# Patient Record
Sex: FEMALE | Race: BLACK OR AFRICAN AMERICAN | NOT HISPANIC OR LATINO | Employment: FULL TIME | URBAN - METROPOLITAN AREA
[De-identification: names, ages, dates, MRNs, and addresses within clinical notes are randomized per-mention and may not be internally consistent; named-entity substitution may affect disease eponyms.]

---

## 2017-06-01 ENCOUNTER — OFFICE VISIT (OUTPATIENT)
Dept: OBSTETRICS AND GYNECOLOGY | Facility: CLINIC | Age: 32
End: 2017-06-01
Attending: OBSTETRICS & GYNECOLOGY
Payer: COMMERCIAL

## 2017-06-01 VITALS
BODY MASS INDEX: 35.08 KG/M2 | HEIGHT: 64 IN | WEIGHT: 205.5 LBS | DIASTOLIC BLOOD PRESSURE: 80 MMHG | SYSTOLIC BLOOD PRESSURE: 112 MMHG

## 2017-06-01 DIAGNOSIS — Z11.51 SCREENING FOR HUMAN PAPILLOMAVIRUS: Primary | ICD-10-CM

## 2017-06-01 DIAGNOSIS — Z01.419 ENCOUNTER FOR GYNECOLOGICAL EXAMINATION (GENERAL) (ROUTINE) WITHOUT ABNORMAL FINDINGS: ICD-10-CM

## 2017-06-01 DIAGNOSIS — Z12.4 ROUTINE CERVICAL SMEAR: ICD-10-CM

## 2017-06-01 PROCEDURE — 99395 PREV VISIT EST AGE 18-39: CPT | Mod: S$GLB,,, | Performed by: OBSTETRICS & GYNECOLOGY

## 2017-06-01 PROCEDURE — 87624 HPV HI-RISK TYP POOLED RSLT: CPT

## 2017-06-01 PROCEDURE — 88142 CYTOPATH C/V THIN LAYER: CPT

## 2017-06-01 PROCEDURE — 99999 PR PBB SHADOW E&M-EST. PATIENT-LVL III: CPT | Mod: PBBFAC,,, | Performed by: OBSTETRICS & GYNECOLOGY

## 2017-06-01 RX ORDER — TOPIRAMATE 100 MG/1
CAPSULE, EXTENDED RELEASE ORAL
COMMUNITY
Start: 2017-04-27 | End: 2018-10-25

## 2017-06-01 NOTE — PROGRESS NOTES
Subjective:       Patient ID: Madhu Saucedo is a 32 y.o. female.    Chief Complaint:  Well Woman (2016 pap normal, hpv qns, )      Patient Active Problem List   Diagnosis    IUD (intrauterine device) in place       History of Present Illness  32 y.o. yo  here for annual exam. No gyn complaints. Doing well. Mirena x 4 years. Trouble losing weight. Info Medi      Past Medical History:   Diagnosis Date    Esophageal reflux     Migraine        History reviewed. No pertinent surgical history.    OB History    Para Term  AB Living   3 2 2  1 2   SAB TAB Ectopic Multiple Live Births       2      # Outcome Date GA Lbr Maximus/2nd Weight Sex Delivery Anes PTL Lv   3 Term 10/25/07   3.969 kg (8 lb 12 oz) M Vag-Spont   BONIFACIO   2 Term 01   4.026 kg (8 lb 14 oz) F Vag-Spont   BONIFACIO   1 AB                   No LMP recorded. Patient is not currently having periods (Reason: Birth Control).   Date of Last Pap: 2016    Review of Systems  Review of Systems   Constitutional: Negative for fatigue and unexpected weight change.   Respiratory: Negative for shortness of breath.    Cardiovascular: Negative for chest pain.   Gastrointestinal: Negative for abdominal pain, constipation, diarrhea, nausea and vomiting.   Genitourinary: Negative for dysuria.   Musculoskeletal: Negative for back pain.   Skin: Negative for rash.   Neurological: Negative for headaches.   Hematological: Does not bruise/bleed easily.   Psychiatric/Behavioral: Negative for behavioral problems.        Objective:   Physical Exam:   Constitutional: She is oriented to person, place, and time. Vital signs are normal. She appears well-developed and well-nourished. No distress.        Pulmonary/Chest: She exhibits no mass. Right breast exhibits no mass, no nipple discharge, no skin change, no tenderness, no bleeding and no swelling. Left breast exhibits no mass, no nipple discharge, no skin change, no tenderness, no bleeding and no  swelling. Breasts are symmetrical.        Abdominal: Soft. Normal appearance and bowel sounds are normal. She exhibits no distension and no mass. There is no tenderness. There is no rebound.     Genitourinary: Vagina normal and uterus normal. There is no rash, tenderness, lesion or injury on the right labia. There is no rash, tenderness, lesion or injury on the left labia. Uterus is not deviated, not enlarged, not fixed, not tender, not hosting fibroids and not experiencing uterine prolapse. Cervix is normal. Right adnexum displays no mass, no tenderness and no fullness. Left adnexum displays no mass, no tenderness and no fullness. No erythema, tenderness, rectocele, cystocele or unspecified prolapse of vaginal walls in the vagina. No vaginal discharge found. Cervix exhibits no motion tenderness, no discharge and no friability. Additional cervical findings: IUD strings visualized          Musculoskeletal: Normal range of motion and moves all extremeties.      Lymphadenopathy:     She has no axillary adenopathy.        Right: No supraclavicular adenopathy present.        Left: No supraclavicular adenopathy present.    Neurological: She is alert and oriented to person, place, and time.    Skin: Skin is warm and dry.    Psychiatric: She has a normal mood and affect. Her behavior is normal. Judgment normal.        Assessment/ Plan:     1. Screening for human papillomavirus  HPV High Risk Genotypes, PCR   2. Routine cervical smear  Liquid-based pap smear, screening   3. Encounter for gynecological examination (general) (routine) without abnormal findings         Follow-up with me in 1 year

## 2017-06-07 LAB
HPV HR 12 DNA CVX QL NAA+PROBE: NEGATIVE
HPV16 DNA SPEC QL NAA+PROBE: NEGATIVE
HPV18 DNA SPEC QL NAA+PROBE: NEGATIVE

## 2018-01-23 ENCOUNTER — PATIENT MESSAGE (OUTPATIENT)
Dept: OBSTETRICS AND GYNECOLOGY | Facility: CLINIC | Age: 33
End: 2018-01-23

## 2018-01-24 ENCOUNTER — OFFICE VISIT (OUTPATIENT)
Dept: OBSTETRICS AND GYNECOLOGY | Facility: CLINIC | Age: 33
End: 2018-01-24
Payer: COMMERCIAL

## 2018-01-24 VITALS
BODY MASS INDEX: 34.62 KG/M2 | WEIGHT: 202.81 LBS | DIASTOLIC BLOOD PRESSURE: 76 MMHG | HEIGHT: 64 IN | SYSTOLIC BLOOD PRESSURE: 130 MMHG

## 2018-01-24 DIAGNOSIS — N89.8 VAGINAL DISCHARGE: Primary | ICD-10-CM

## 2018-01-24 PROCEDURE — 87480 CANDIDA DNA DIR PROBE: CPT

## 2018-01-24 PROCEDURE — 99999 PR PBB SHADOW E&M-EST. PATIENT-LVL III: CPT | Mod: PBBFAC,,, | Performed by: NURSE PRACTITIONER

## 2018-01-24 PROCEDURE — 99213 OFFICE O/P EST LOW 20 MIN: CPT | Mod: S$GLB,,, | Performed by: NURSE PRACTITIONER

## 2018-01-24 RX ORDER — ONDANSETRON 4 MG/1
TABLET, FILM COATED ORAL
COMMUNITY
Start: 2017-12-13

## 2018-01-24 NOTE — PROGRESS NOTES
Chief Complaint   Patient presents with    Vaginal Discharge      Dr LUNDBERG     PELVIC PRESSURE       (Dr. Cullen patient)  Last PAP:  2017 Normal,  HPV negative      Madhu Saucedo is a 32 y.o. female  presents with complaint of vaginal discharge for 1 week.  She denies itching.  reports odor.  She states the discharge is white.  No LMP recorded. Patient is not currently having periods (Reason: Birth Control).  States she has had BV in the past, and feels like she has it now.  Ms. Saucedo is currently sexually active with a single male partner/.  She declines STD screening today.    Past Medical History:   Diagnosis Date    Esophageal reflux     Migraine      History reviewed. No pertinent surgical history.  Social History   Substance Use Topics    Smoking status: Never Smoker    Smokeless tobacco: Never Used    Alcohol use Yes      Comment: 3xwk      Family History   Problem Relation Age of Onset    Breast cancer Maternal Aunt     Colon cancer Neg Hx     Ovarian cancer Neg Hx      OB History    Para Term  AB Living   3 2 2   1 2   SAB TAB Ectopic Multiple Live Births           2      # Outcome Date GA Lbr Maximus/2nd Weight Sex Delivery Anes PTL Lv   3 Term 10/25/07   3.969 kg (8 lb 12 oz) M Vag-Spont   BONIFACIO   2 Term 01   4.026 kg (8 lb 14 oz) F Vag-Spont   BONIFACIO   1 AB                     ROS:  GENERAL: No fever, chills, fatigue or weight loss.  VULVAR: denies pain, denies lesions and denies itching.  VAGINAL: denies itching, denies abnormal bleeding and denies lesions.  Reports discharge and odor.  ABDOMEN: reports low abdominal pressure. Denies nausea. Denies vomiting. No diarrhea. No constipation  BREAST: Denies pain. No lumps. No discharge.  URINARY: No incontinence, no nocturia, no frequency and no dysuria.  CARDIOVASCULAR: No chest pain. No shortness of breath. No leg cramps.  NEUROLOGICAL: No headaches. No vision changes.    Vitals:    18 1412   BP: 130/76  "  Weight: 92 kg (202 lb 13.2 oz)   Height: 5' 4" (1.626 m)   PainSc: 0-No pain     Body mass index is 34.81 kg/m².    PHYSICAL EXAM:   External genitalia: normal external genitalia, no erythema and no discharge;  urethra: normal appearing urethra with no masses, tenderness or lesions.   Vagina: normal appearing vagina with normal color; small amount white milky discharge; no lesions.    Cervix: normal appearing cervix without discharge or lesions, cervical motion tenderness absent.  IUD strings visible/palpable.  Uterus: uterus is normal size, shape, consistency and nontender. Adnexa:  normal adnexa and no mass, fullness, tenderness.    ASSESSMENT and PLAN:  Vaginal discharge  -     Vaginosis Screen by DNA Probe    * Prefers something oral, other than Flagyl if (+ BV), as it did not work in the past.    * Will call with results when finalized.    Patient was counseled today on vaginitis prevention, including to:  1.  Avoid feminine products such as deodorant soaps, body wash, bubble bath, douches, scented toilet paper, deodorant tampons or pads, feminine wipes, chronic pad use, etc.  2.  Avoid other vulvovaginal irritants such as long hot baths, humidity, tight, synthetic clothing, chlorine and sitting around in wet bathing suits  3.  Wear cotton underwear.  4.  Shower immediately after exercise and change clothes  5.  Use polyurethane condoms without spermicide if sexually active and symptoms are triggered by intercourse    To help maintain a healthy vaginal pH:  For vaginal discomfort or feminine odor, she may use OTC Rephresh gel.  It is a vaginal gel used to neutralize and maintain a healthy vaginal pH.  Patients who get Yeast or BV often use this to help reduce risk of vaginal issues.  Maintaining a healthy pH helps beneficial bacteria to thrive and inhibits overgrowths of yeast and pathogenic bacteria.  It can be found at any drugstore on the feminine product aisle, and can be used as frequently as every 3 " days.          FOLLOW UP: PRN lack of improvement.

## 2018-01-25 ENCOUNTER — PATIENT MESSAGE (OUTPATIENT)
Dept: OBSTETRICS AND GYNECOLOGY | Facility: CLINIC | Age: 33
End: 2018-01-25

## 2018-01-25 LAB
CANDIDA RRNA VAG QL PROBE: NEGATIVE
G VAGINALIS RRNA GENITAL QL PROBE: POSITIVE
T VAGINALIS RRNA GENITAL QL PROBE: NEGATIVE

## 2018-01-26 ENCOUNTER — TELEPHONE (OUTPATIENT)
Dept: OBSTETRICS AND GYNECOLOGY | Facility: CLINIC | Age: 33
End: 2018-01-26

## 2018-01-26 RX ORDER — TINIDAZOLE 500 MG/1
TABLET ORAL
Qty: 8 TABLET | Refills: 0 | Status: SHIPPED | OUTPATIENT
Start: 2018-01-26 | End: 2018-05-02

## 2018-01-26 NOTE — TELEPHONE ENCOUNTER
----- Message from Tahmina Estrella NP sent at 1/26/2018 11:18 AM CST -----  Patient's AFFIRM swab was (+) for BV.    Please call her and let her know Rx sent for (Tinidazole x 2 days).  Remind patient she cannot drink while taking medication or for 3 days after completion of medication.      # Instruct patient to contact pharmacy to check on status of RX

## 2018-01-26 NOTE — PROGRESS NOTES
Patient's AFFIRM swab was (+) for BV.    Please call her and let her know Rx sent for (Tinidazole x 2 days).  Remind patient she cannot drink while taking medication or for 3 days after completion of medication.    * Instruct patient to contact pharmacy to check on status of RX

## 2018-04-14 ENCOUNTER — PATIENT MESSAGE (OUTPATIENT)
Dept: OBSTETRICS AND GYNECOLOGY | Facility: CLINIC | Age: 33
End: 2018-04-14

## 2018-04-14 DIAGNOSIS — Z20.2 POSSIBLE EXPOSURE TO STD: Primary | ICD-10-CM

## 2018-04-16 NOTE — TELEPHONE ENCOUNTER
I called patient.  unfaithful. Wants STD testing. Give appt with me some time in the next couple of weeks

## 2018-04-18 ENCOUNTER — LAB VISIT (OUTPATIENT)
Dept: LAB | Facility: OTHER | Age: 33
End: 2018-04-18
Attending: OBSTETRICS & GYNECOLOGY
Payer: COMMERCIAL

## 2018-04-18 DIAGNOSIS — Z20.2 POSSIBLE EXPOSURE TO STD: ICD-10-CM

## 2018-04-18 LAB
HBV SURFACE AG SERPL QL IA: NEGATIVE
HIV 1+2 AB+HIV1 P24 AG SERPL QL IA: NEGATIVE
RPR SER QL: NORMAL

## 2018-04-18 PROCEDURE — 86696 HERPES SIMPLEX TYPE 2 TEST: CPT

## 2018-04-18 PROCEDURE — 36415 COLL VENOUS BLD VENIPUNCTURE: CPT

## 2018-04-18 PROCEDURE — 86703 HIV-1/HIV-2 1 RESULT ANTBDY: CPT

## 2018-04-18 PROCEDURE — 87340 HEPATITIS B SURFACE AG IA: CPT

## 2018-04-18 PROCEDURE — 86592 SYPHILIS TEST NON-TREP QUAL: CPT

## 2018-04-20 LAB
HSV1 IGG SERPL QL IA: NEGATIVE
HSV2 IGG SERPL QL IA: POSITIVE

## 2018-05-02 ENCOUNTER — OFFICE VISIT (OUTPATIENT)
Dept: OBSTETRICS AND GYNECOLOGY | Facility: CLINIC | Age: 33
End: 2018-05-02
Attending: OBSTETRICS & GYNECOLOGY
Payer: COMMERCIAL

## 2018-05-02 VITALS
WEIGHT: 202.69 LBS | HEIGHT: 64 IN | SYSTOLIC BLOOD PRESSURE: 140 MMHG | BODY MASS INDEX: 34.6 KG/M2 | DIASTOLIC BLOOD PRESSURE: 90 MMHG

## 2018-05-02 DIAGNOSIS — F41.1 GAD (GENERALIZED ANXIETY DISORDER): ICD-10-CM

## 2018-05-02 DIAGNOSIS — Z20.2 EXPOSURE TO SEXUALLY TRANSMITTED DISEASE (STD): ICD-10-CM

## 2018-05-02 DIAGNOSIS — Z11.3 SCREENING EXAMINATION FOR VENEREAL DISEASE: Primary | ICD-10-CM

## 2018-05-02 PROCEDURE — 87480 CANDIDA DNA DIR PROBE: CPT

## 2018-05-02 PROCEDURE — 99999 PR PBB SHADOW E&M-EST. PATIENT-LVL III: CPT | Mod: PBBFAC,,, | Performed by: OBSTETRICS & GYNECOLOGY

## 2018-05-02 PROCEDURE — 87491 CHLMYD TRACH DNA AMP PROBE: CPT

## 2018-05-02 PROCEDURE — 99213 OFFICE O/P EST LOW 20 MIN: CPT | Mod: S$GLB,,, | Performed by: OBSTETRICS & GYNECOLOGY

## 2018-05-02 PROCEDURE — 87510 GARDNER VAG DNA DIR PROBE: CPT

## 2018-05-02 RX ORDER — ALPRAZOLAM 0.5 MG/1
0.5 TABLET ORAL 3 TIMES DAILY PRN
Qty: 20 TABLET | Refills: 0 | Status: SHIPPED | OUTPATIENT
Start: 2018-05-02 | End: 2020-10-27

## 2018-05-02 NOTE — PROGRESS NOTES
Subjective:       Patient ID: Madhu Saucedo is a 32 y.o. female.    Chief Complaint:  No chief complaint on file.      Patient Active Problem List   Diagnosis    IUD (intrauterine device) in place       History of Present Illness  Here for STD testing.  with another woman. Labs showed +HSV. No outbreaks, explained could be from exposure from years ago. All questions answered. Will do GC, Chl, Trich swab today. Talk x 20 min. Rx Xanax. Pt upset and not sleeping. Denies Si, HI  Past Medical History:   Diagnosis Date    Esophageal reflux     Migraine        History reviewed. No pertinent surgical history.    OB History    Para Term  AB Living   3 2 2   1 2   SAB TAB Ectopic Multiple Live Births           2      # Outcome Date GA Lbr Maximus/2nd Weight Sex Delivery Anes PTL Lv   3 Term 10/25/07   3.969 kg (8 lb 12 oz) M Vag-Spont   BONIFACIO   2 Term 01   4.026 kg (8 lb 14 oz) F Vag-Spont   BONIFACIO   1 AB                   No LMP recorded. Patient is not currently having periods (Reason: Birth Control).   Date of Last Pap: 2017    Review of Systems  Review of Systems   Constitutional: Negative for fatigue and unexpected weight change.   Respiratory: Negative for shortness of breath.    Cardiovascular: Negative for chest pain.   Gastrointestinal: Negative for abdominal pain, constipation, diarrhea, nausea and vomiting.   Genitourinary: Negative for dysuria.   Musculoskeletal: Negative for back pain.   Skin: Negative for rash.   Neurological: Negative for headaches.   Hematological: Does not bruise/bleed easily.   Psychiatric/Behavioral: Negative for behavioral problems.        Objective:   Physical Exam:   Constitutional: She appears well-developed and well-nourished.               Genitourinary: Vagina normal. Cervix is normal. No vaginal discharge found. Cervix exhibits no motion tenderness, no discharge and no friability. Additional cervical findings: IUD strings visualized                      Assessment/ Plan:     1. Screening examination for venereal disease  C. trachomatis/N. gonorrhoeae by AMP DNA Cervicovaginal    Vaginosis Screen by DNA Probe   2. Exposure to sexually transmitted disease (STD)     3. ROMELIA (generalized anxiety disorder)  ALPRAZolam (XANAX) 0.5 MG tablet       Follow-up with me in 1 year

## 2018-05-03 LAB
C TRACH DNA SPEC QL NAA+PROBE: NOT DETECTED
CANDIDA RRNA VAG QL PROBE: NEGATIVE
G VAGINALIS RRNA GENITAL QL PROBE: POSITIVE
N GONORRHOEA DNA SPEC QL NAA+PROBE: NOT DETECTED
T VAGINALIS RRNA GENITAL QL PROBE: NEGATIVE

## 2018-05-04 ENCOUNTER — TELEPHONE (OUTPATIENT)
Dept: OBSTETRICS AND GYNECOLOGY | Facility: CLINIC | Age: 33
End: 2018-05-04

## 2018-05-04 RX ORDER — METRONIDAZOLE 7.5 MG/G
1 GEL VAGINAL 2 TIMES DAILY
Qty: 70 G | Refills: 3 | Status: SHIPPED | OUTPATIENT
Start: 2018-05-04 | End: 2018-05-09

## 2018-07-31 ENCOUNTER — OFFICE VISIT (OUTPATIENT)
Dept: OBSTETRICS AND GYNECOLOGY | Facility: CLINIC | Age: 33
End: 2018-07-31
Attending: OBSTETRICS & GYNECOLOGY
Payer: COMMERCIAL

## 2018-07-31 ENCOUNTER — PATIENT MESSAGE (OUTPATIENT)
Dept: OBSTETRICS AND GYNECOLOGY | Facility: CLINIC | Age: 33
End: 2018-07-31

## 2018-07-31 VITALS
BODY MASS INDEX: 35.51 KG/M2 | DIASTOLIC BLOOD PRESSURE: 84 MMHG | HEIGHT: 64 IN | WEIGHT: 208 LBS | SYSTOLIC BLOOD PRESSURE: 134 MMHG

## 2018-07-31 DIAGNOSIS — Z30.432 ENCOUNTER FOR IUD REMOVAL: ICD-10-CM

## 2018-07-31 DIAGNOSIS — N76.0 ACUTE VAGINITIS: Primary | ICD-10-CM

## 2018-07-31 DIAGNOSIS — Z01.419 ENCOUNTER FOR GYNECOLOGICAL EXAMINATION (GENERAL) (ROUTINE) WITHOUT ABNORMAL FINDINGS: ICD-10-CM

## 2018-07-31 PROCEDURE — 99395 PREV VISIT EST AGE 18-39: CPT | Mod: S$GLB,,, | Performed by: OBSTETRICS & GYNECOLOGY

## 2018-07-31 PROCEDURE — 87660 TRICHOMONAS VAGIN DIR PROBE: CPT

## 2018-07-31 PROCEDURE — 99999 PR PBB SHADOW E&M-EST. PATIENT-LVL III: CPT | Mod: PBBFAC,,, | Performed by: OBSTETRICS & GYNECOLOGY

## 2018-07-31 NOTE — PROGRESS NOTES
Subjective:       Patient ID: Madhu Saucedo is a 33 y.o. female.    Chief Complaint:  Well Woman (pt last pap 2017 normal, HPV neg, pt c/o IUD removal and start OCP's )      Patient Active Problem List   Diagnosis    IUD (intrauterine device) in place       History of Present Illness  33 y.o. yo  here for annual exam. No gyn complaints. Doing well. Mirena has been in since May 2018, wants removed. Wants to try OCP for awhile. Considering vasectomy vs another Mirena vs OCP vs BTL. Discouraged BTL due to surgery and periods become heavier. Patient wants IUD removed today and was to start with continuous OCP.     Past Medical History:   Diagnosis Date    Esophageal reflux     Migraine        History reviewed. No pertinent surgical history.    OB History    Para Term  AB Living   3 2 2   1 2   SAB TAB Ectopic Multiple Live Births           2      # Outcome Date GA Lbr Maximus/2nd Weight Sex Delivery Anes PTL Lv   3 Term 10/25/07   3.969 kg (8 lb 12 oz) M Vag-Spont   BONIFACIO   2 Term 01   4.026 kg (8 lb 14 oz) F Vag-Spont   BONIFACIO   1 AB                   No LMP recorded. Patient is not currently having periods (Reason: Birth Control).   Date of Last Pap: 2017    Review of Systems  Review of Systems   Constitutional: Negative for fatigue and unexpected weight change.   Respiratory: Negative for shortness of breath.    Cardiovascular: Negative for chest pain.   Gastrointestinal: Negative for abdominal pain, constipation, diarrhea, nausea and vomiting.   Genitourinary: Negative for dysuria.   Musculoskeletal: Negative for back pain.   Skin: Negative for rash.   Neurological: Negative for headaches.   Hematological: Does not bruise/bleed easily.   Psychiatric/Behavioral: Negative for behavioral problems.        Objective:   Physical Exam:   Constitutional: She is oriented to person, place, and time. Vital signs are normal. She appears well-developed and well-nourished. No distress.         Pulmonary/Chest: She exhibits no mass. Right breast exhibits no mass, no nipple discharge, no skin change, no tenderness, no bleeding and no swelling. Left breast exhibits no mass, no nipple discharge, no skin change, no tenderness, no bleeding and no swelling. Breasts are symmetrical.        Abdominal: Soft. Normal appearance and bowel sounds are normal. She exhibits no distension and no mass. There is no tenderness. There is no rebound.     Genitourinary: Vagina normal and uterus normal. There is no rash, tenderness, lesion or injury on the right labia. There is no rash, tenderness, lesion or injury on the left labia. Uterus is not deviated, not enlarged, not fixed, not tender, not hosting fibroids and not experiencing uterine prolapse. Cervix is normal. Right adnexum displays no mass, no tenderness and no fullness. Left adnexum displays no mass, no tenderness and no fullness. No erythema, tenderness, rectocele, cystocele or unspecified prolapse of vaginal walls in the vagina. No vaginal discharge found. Cervix exhibits no motion tenderness, no discharge and no friability. Additional cervical findings: IUD strings visualized          Musculoskeletal: Normal range of motion and moves all extremeties.      Lymphadenopathy:     She has no axillary adenopathy.        Right: No supraclavicular adenopathy present.        Left: No supraclavicular adenopathy present.    Neurological: She is alert and oriented to person, place, and time.    Skin: Skin is warm and dry.    Psychiatric: She has a normal mood and affect. Her behavior is normal. Judgment normal.          Patient presents to the office requesting IUD to be removed. After verbal consent was obtained, speculum was placed. Ring forceps were used to grasp the strings and the IUD was removed without difficulty. The patient tolerated the procedure well. Speculum removed.     Assessment/ Plan:     1. Acute vaginitis  Vaginosis Screen by DNA Probe   2. Encounter for  gynecological examination (general) (routine) without abnormal findings     3. Encounter for IUD removal         Follow-up with me in 1 year

## 2018-08-01 LAB
CANDIDA RRNA VAG QL PROBE: NEGATIVE
G VAGINALIS RRNA GENITAL QL PROBE: NEGATIVE
T VAGINALIS RRNA GENITAL QL PROBE: NEGATIVE

## 2018-08-01 RX ORDER — NORGESTIMATE AND ETHINYL ESTRADIOL 0.25-0.035
1 KIT ORAL DAILY
Qty: 84 TABLET | Refills: 3 | Status: SHIPPED | OUTPATIENT
Start: 2018-08-01 | End: 2019-04-06 | Stop reason: SDUPTHER

## 2018-08-01 NOTE — TELEPHONE ENCOUNTER
Pt seen for annual yesterday. Continuous OCPs discussed. Please send to pharm.    Allergies and Pharm UTD

## 2018-10-24 ENCOUNTER — PATIENT MESSAGE (OUTPATIENT)
Dept: OBSTETRICS AND GYNECOLOGY | Facility: CLINIC | Age: 33
End: 2018-10-24

## 2018-10-25 ENCOUNTER — OFFICE VISIT (OUTPATIENT)
Dept: OBSTETRICS AND GYNECOLOGY | Facility: CLINIC | Age: 33
End: 2018-10-25
Attending: OBSTETRICS & GYNECOLOGY
Payer: COMMERCIAL

## 2018-10-25 VITALS
BODY MASS INDEX: 35.83 KG/M2 | DIASTOLIC BLOOD PRESSURE: 80 MMHG | WEIGHT: 209.88 LBS | HEIGHT: 64 IN | SYSTOLIC BLOOD PRESSURE: 128 MMHG

## 2018-10-25 DIAGNOSIS — N76.0 ACUTE VAGINITIS: Primary | ICD-10-CM

## 2018-10-25 PROCEDURE — 99999 PR PBB SHADOW E&M-EST. PATIENT-LVL III: CPT | Mod: PBBFAC,,, | Performed by: OBSTETRICS & GYNECOLOGY

## 2018-10-25 PROCEDURE — 87660 TRICHOMONAS VAGIN DIR PROBE: CPT

## 2018-10-25 PROCEDURE — 99213 OFFICE O/P EST LOW 20 MIN: CPT | Mod: S$GLB,,, | Performed by: OBSTETRICS & GYNECOLOGY

## 2018-10-25 RX ORDER — ERGOCALCIFEROL 1.25 MG/1
CAPSULE ORAL
Refills: 12 | COMMUNITY
Start: 2018-10-01 | End: 2019-08-07

## 2018-10-25 NOTE — PROGRESS NOTES
"Madhu Saucedo is a 33 y.o. female  presents with complaint of vaginal discharge for 2 days.  She denies itching.  reports odor.  She states the discharge is yellow.  No LMP recorded (lmp unknown). Patient is not currently having periods (Reason: Birth Control)..  '  Started eating yogurt when the symptoms started.     Past Medical History:   Diagnosis Date    Esophageal reflux     Migraine      History reviewed. No pertinent surgical history.  OB History    Para Term  AB Living   3 2 2   1 2   SAB TAB Ectopic Multiple Live Births           2      # Outcome Date GA Lbr Maximus/2nd Weight Sex Delivery Anes PTL Lv   3 Term 10/25/07   3.969 kg (8 lb 12 oz) M Vag-Spont   BONIFACIO   2 Term 01   4.026 kg (8 lb 14 oz) F Vag-Spont   BONIFACIO   1 AB                     ROS:  GENERAL: No fever, chills, fatigability or weight loss.  ABDOMEN: No abdominal pain. Denies nausea. Denies vomiting. No diarrhea. No constipation  BREAST: Denies pain. No lumps. No discharge.  URINARY: No incontinence, no nocturia, no frequency and no dysuria.  CARDIOVASCULAR: No chest pain. No shortness of breath. No leg cramps.  NEUROLOGICAL: No headaches. No vision changes.  : See HPI    Vitals:    10/25/18 1513   BP: 128/80   Weight: 95.2 kg (209 lb 14.1 oz)   Height: 5' 4" (1.626 m)   PainSc: 0-No pain         PHYSICAL EXAM:   External genitalia and urethra within normal limits.   Vagina- without lesions, without discharge, without erythema, without ulcers  Cervix- without cervical motion tenderness.   Uterus normal in size and nontender. No adnexal masses or tenderness palpated.      ASSESSMENT and PLAN:  Acute vaginitis        Affirm done, declines cultures  Exam appeared normal      FOLLOW UP: PRN lack of improvement.  "

## 2018-10-29 ENCOUNTER — PATIENT MESSAGE (OUTPATIENT)
Dept: OBSTETRICS AND GYNECOLOGY | Facility: CLINIC | Age: 33
End: 2018-10-29

## 2018-10-29 RX ORDER — METRONIDAZOLE 500 MG/1
500 TABLET ORAL EVERY 12 HOURS
Qty: 14 TABLET | Refills: 0 | Status: SHIPPED | OUTPATIENT
Start: 2018-10-29 | End: 2018-11-05

## 2019-04-08 ENCOUNTER — PATIENT MESSAGE (OUTPATIENT)
Dept: OBSTETRICS AND GYNECOLOGY | Facility: CLINIC | Age: 34
End: 2019-04-08

## 2019-04-08 RX ORDER — NORGESTIMATE AND ETHINYL ESTRADIOL 0.25-0.035
KIT ORAL
Qty: 84 TABLET | Refills: 2 | Status: SHIPPED | OUTPATIENT
Start: 2019-04-08 | End: 2020-01-15 | Stop reason: SDUPTHER

## 2019-04-08 RX ORDER — NORGESTIMATE AND ETHINYL ESTRADIOL 0.25-0.035
1 KIT ORAL DAILY
Qty: 112 TABLET | Refills: 0 | Status: SHIPPED | OUTPATIENT
Start: 2019-04-08 | End: 2019-05-15 | Stop reason: SDUPTHER

## 2019-05-14 ENCOUNTER — PATIENT MESSAGE (OUTPATIENT)
Dept: OBSTETRICS AND GYNECOLOGY | Facility: CLINIC | Age: 34
End: 2019-05-14

## 2019-05-15 ENCOUNTER — PATIENT MESSAGE (OUTPATIENT)
Dept: OBSTETRICS AND GYNECOLOGY | Facility: CLINIC | Age: 34
End: 2019-05-15

## 2019-05-15 RX ORDER — NORGESTIMATE AND ETHINYL ESTRADIOL 0.25-0.035
1 KIT ORAL DAILY
Qty: 112 TABLET | Refills: 0 | Status: SHIPPED | OUTPATIENT
Start: 2019-05-15 | End: 2019-08-07

## 2019-08-07 ENCOUNTER — OFFICE VISIT (OUTPATIENT)
Dept: OBSTETRICS AND GYNECOLOGY | Facility: CLINIC | Age: 34
End: 2019-08-07
Attending: OBSTETRICS & GYNECOLOGY
Payer: COMMERCIAL

## 2019-08-07 VITALS
BODY MASS INDEX: 36.08 KG/M2 | WEIGHT: 211.31 LBS | SYSTOLIC BLOOD PRESSURE: 126 MMHG | DIASTOLIC BLOOD PRESSURE: 82 MMHG | HEIGHT: 64 IN

## 2019-08-07 DIAGNOSIS — Z01.419 ENCOUNTER FOR GYNECOLOGICAL EXAMINATION (GENERAL) (ROUTINE) WITHOUT ABNORMAL FINDINGS: ICD-10-CM

## 2019-08-07 DIAGNOSIS — Z12.31 ENCOUNTER FOR SCREENING MAMMOGRAM FOR MALIGNANT NEOPLASM OF BREAST: Primary | ICD-10-CM

## 2019-08-07 PROCEDURE — 99395 PREV VISIT EST AGE 18-39: CPT | Mod: S$GLB,,, | Performed by: OBSTETRICS & GYNECOLOGY

## 2019-08-07 PROCEDURE — 99395 PR PREVENTIVE VISIT,EST,18-39: ICD-10-PCS | Mod: S$GLB,,, | Performed by: OBSTETRICS & GYNECOLOGY

## 2019-08-07 PROCEDURE — 99999 PR PBB SHADOW E&M-EST. PATIENT-LVL III: CPT | Mod: PBBFAC,,, | Performed by: OBSTETRICS & GYNECOLOGY

## 2019-08-07 PROCEDURE — 99999 PR PBB SHADOW E&M-EST. PATIENT-LVL III: ICD-10-PCS | Mod: PBBFAC,,, | Performed by: OBSTETRICS & GYNECOLOGY

## 2019-08-07 RX ORDER — PANTOPRAZOLE SODIUM 40 MG/1
TABLET, DELAYED RELEASE ORAL
COMMUNITY
Start: 2019-08-04 | End: 2020-10-27

## 2019-08-07 NOTE — PROGRESS NOTES
Subjective:       Patient ID: Madhu Saucedo is a 34 y.o. female.    Chief Complaint:  Well Woman (pap nl/hpv- 2017 )      Patient Active Problem List   Diagnosis    IUD (intrauterine device) in place       History of Present Illness  34 y.o. yo  here for annual exam. On continuous OCP. No periods. Some night sweats. rec stop and do placebo for a couple of packs instead of continuous. If not better call. Daughter graduated [employer] and is going to [employer], son at [employer]    Patient had a normal pap smear and HPV in 2017 at last annual visit. I explained new guidelines. Will repeat pap and HPV every 3 years. Answered all questions. Patient agrees.     Past Medical History:   Diagnosis Date    Esophageal reflux     Migraine        History reviewed. No pertinent surgical history.    OB History    Para Term  AB Living   3 2 2   1 2   SAB TAB Ectopic Multiple Live Births           2      # Outcome Date GA Lbr Maximus/2nd Weight Sex Delivery Anes PTL Lv   3 Term 10/25/07   3.969 kg (8 lb 12 oz) M Vag-Spont   BONIFACIO   2 Term 01   4.026 kg (8 lb 14 oz) F Vag-Spont   BONIFACIO   1 AB                No LMP recorded. (Menstrual status: Birth Control).   Date of Last Pap: 2017    Review of Systems  Review of Systems   Constitutional: Negative for fatigue and unexpected weight change.   Respiratory: Negative for shortness of breath.    Cardiovascular: Negative for chest pain.   Gastrointestinal: Negative for abdominal pain, constipation, diarrhea, nausea and vomiting.   Genitourinary: Negative for dysuria.   Musculoskeletal: Negative for back pain.   Skin: Negative for rash.   Neurological: Negative for headaches.   Hematological: Does not bruise/bleed easily.   Psychiatric/Behavioral: Negative for behavioral problems.        Objective:   Physical Exam:   Constitutional: She is oriented to person, place, and time. Vital signs are normal. She appears well-developed and well-nourished. No distress.         Pulmonary/Chest: She exhibits no mass. Right breast exhibits no mass, no nipple discharge, no skin change, no tenderness, no bleeding and no swelling. Left breast exhibits no mass, no nipple discharge, no skin change, no tenderness, no bleeding and no swelling. Breasts are symmetrical.        Abdominal: Soft. Normal appearance and bowel sounds are normal. She exhibits no distension and no mass. There is no tenderness. There is no rebound.     Genitourinary: Vagina normal and uterus normal. There is no rash, tenderness, lesion or injury on the right labia. There is no rash, tenderness, lesion or injury on the left labia. Uterus is not deviated, not enlarged, not fixed, not tender, not hosting fibroids and not experiencing uterine prolapse. Cervix is normal. Right adnexum displays no mass, no tenderness and no fullness. Left adnexum displays no mass, no tenderness and no fullness. No erythema, tenderness, rectocele, cystocele or unspecified prolapse of vaginal walls in the vagina. No vaginal discharge found. Cervix exhibits no motion tenderness, no discharge and no friability.           Musculoskeletal: Normal range of motion and moves all extremeties.      Lymphadenopathy:     She has no axillary adenopathy.        Right: No supraclavicular adenopathy present.        Left: No supraclavicular adenopathy present.    Neurological: She is alert and oriented to person, place, and time.    Skin: Skin is warm and dry.    Psychiatric: She has a normal mood and affect. Her behavior is normal. Judgment normal.        Assessment/ Plan:     1. Encounter for screening mammogram for malignant neoplasm of breast  Mammo Digital Screening [facility] w/ Daniel   2. Encounter for gynecological examination (general) (routine) without abnormal findings         Follow-up with me in 1 year

## 2019-08-13 ENCOUNTER — HOSPITAL ENCOUNTER (OUTPATIENT)
Dept: RADIOLOGY | Facility: HOSPITAL | Age: 34
Discharge: HOME | End: 2019-08-13
Attending: OBSTETRICS & GYNECOLOGY
Payer: COMMERCIAL

## 2019-08-13 DIAGNOSIS — Z12.31 ENCOUNTER FOR SCREENING MAMMOGRAM FOR MALIGNANT NEOPLASM OF BREAST: ICD-10-CM

## 2019-08-13 PROCEDURE — 77067 SCR MAMMO BI INCL CAD: CPT | Mod: 26,,, | Performed by: RADIOLOGY

## 2019-08-13 PROCEDURE — 77067 MAMMO DIGITAL SCREENING BILAT WITH TOMOSYNTHESIS_CAD: ICD-10-PCS | Mod: 26,,, | Performed by: RADIOLOGY

## 2019-08-13 PROCEDURE — 77067 SCR MAMMO BI INCL CAD: CPT | Mod: TC

## 2019-08-13 PROCEDURE — 77063 BREAST TOMOSYNTHESIS BI: CPT | Mod: 26,,, | Performed by: RADIOLOGY

## 2019-08-13 PROCEDURE — 77063 MAMMO DIGITAL SCREENING BILAT WITH TOMOSYNTHESIS_CAD: ICD-10-PCS | Mod: 26,,, | Performed by: RADIOLOGY

## 2020-01-16 RX ORDER — NORGESTIMATE AND ETHINYL ESTRADIOL 0.25-0.035
KIT ORAL
Qty: 84 TABLET | Refills: 2 | Status: SHIPPED | OUTPATIENT
Start: 2020-01-16 | End: 2020-04-06 | Stop reason: SDUPTHER

## 2020-04-06 RX ORDER — NORGESTIMATE AND ETHINYL ESTRADIOL 0.25-0.035
KIT ORAL
Qty: 84 TABLET | Refills: 2 | Status: SHIPPED | OUTPATIENT
Start: 2020-04-06 | End: 2020-06-05 | Stop reason: SDUPTHER

## 2020-06-05 RX ORDER — NORGESTIMATE AND ETHINYL ESTRADIOL 0.25-0.035
KIT ORAL
Qty: 84 TABLET | Refills: 0 | Status: SHIPPED | OUTPATIENT
Start: 2020-06-05 | End: 2020-07-31 | Stop reason: SDUPTHER

## 2020-10-27 ENCOUNTER — PATIENT MESSAGE (OUTPATIENT)
Dept: OBSTETRICS AND GYNECOLOGY | Facility: CLINIC | Age: 35
End: 2020-10-27

## 2020-10-27 RX ORDER — NORGESTIMATE AND ETHINYL ESTRADIOL 0.25-0.035
KIT ORAL
Qty: 112 TABLET | Refills: 0 | Status: SHIPPED | OUTPATIENT
Start: 2020-10-27 | End: 2020-12-17 | Stop reason: SDUPTHER

## 2020-12-17 ENCOUNTER — OFFICE VISIT (OUTPATIENT)
Dept: OBSTETRICS AND GYNECOLOGY | Facility: CLINIC | Age: 35
End: 2020-12-17
Attending: OBSTETRICS & GYNECOLOGY
Payer: COMMERCIAL

## 2020-12-17 VITALS
SYSTOLIC BLOOD PRESSURE: 120 MMHG | HEIGHT: 64 IN | BODY MASS INDEX: 39.33 KG/M2 | WEIGHT: 230.38 LBS | DIASTOLIC BLOOD PRESSURE: 84 MMHG

## 2020-12-17 DIAGNOSIS — Z12.4 ENCOUNTER FOR PAPANICOLAOU SMEAR FOR CERVICAL CANCER SCREENING: ICD-10-CM

## 2020-12-17 DIAGNOSIS — Z11.51 ENCOUNTER FOR SCREENING FOR HUMAN PAPILLOMAVIRUS (HPV): Primary | ICD-10-CM

## 2020-12-17 PROCEDURE — 3008F BODY MASS INDEX DOCD: CPT | Mod: CPTII,S$GLB,, | Performed by: OBSTETRICS & GYNECOLOGY

## 2020-12-17 PROCEDURE — 99999 PR PBB SHADOW E&M-EST. PATIENT-LVL III: CPT | Mod: PBBFAC,,, | Performed by: OBSTETRICS & GYNECOLOGY

## 2020-12-17 PROCEDURE — 87624 HPV HI-RISK TYP POOLED RSLT: CPT

## 2020-12-17 PROCEDURE — 88175 CYTOPATH C/V AUTO FLUID REDO: CPT

## 2020-12-17 PROCEDURE — 99999 PR PBB SHADOW E&M-EST. PATIENT-LVL III: ICD-10-PCS | Mod: PBBFAC,,, | Performed by: OBSTETRICS & GYNECOLOGY

## 2020-12-17 PROCEDURE — 1126F AMNT PAIN NOTED NONE PRSNT: CPT | Mod: S$GLB,,, | Performed by: OBSTETRICS & GYNECOLOGY

## 2020-12-17 PROCEDURE — 99395 PREV VISIT EST AGE 18-39: CPT | Mod: S$GLB,,, | Performed by: OBSTETRICS & GYNECOLOGY

## 2020-12-17 PROCEDURE — 1126F PR PAIN SEVERITY QUANTIFIED, NO PAIN PRESENT: ICD-10-PCS | Mod: S$GLB,,, | Performed by: OBSTETRICS & GYNECOLOGY

## 2020-12-17 PROCEDURE — 99395 PR PREVENTIVE VISIT,EST,18-39: ICD-10-PCS | Mod: S$GLB,,, | Performed by: OBSTETRICS & GYNECOLOGY

## 2020-12-17 PROCEDURE — 3008F PR BODY MASS INDEX (BMI) DOCUMENTED: ICD-10-PCS | Mod: CPTII,S$GLB,, | Performed by: OBSTETRICS & GYNECOLOGY

## 2020-12-17 RX ORDER — ASPIRIN 81 MG/1
TABLET ORAL
COMMUNITY
End: 2021-12-23

## 2020-12-17 RX ORDER — NORGESTIMATE AND ETHINYL ESTRADIOL 0.25-0.035
KIT ORAL
Qty: 112 TABLET | Refills: 4 | Status: SHIPPED | OUTPATIENT
Start: 2020-12-17 | End: 2021-09-10 | Stop reason: SDUPTHER

## 2020-12-17 RX ORDER — DEXLANSOPRAZOLE 60 MG/1
CAPSULE, DELAYED RELEASE ORAL
COMMUNITY
End: 2023-03-23

## 2020-12-17 RX ORDER — NAPROXEN SODIUM 220 MG/1
TABLET ORAL
COMMUNITY

## 2020-12-17 NOTE — PROGRESS NOTES
Subjective:       Patient ID: Madhu Dela Cruz is a 35 y.o. female.    Chief Complaint:  Annual Exam (last pap/hpv 2017 normal, mammo 2019 birads 1)      Patient Active Problem List   Diagnosis    IUD (intrauterine device) in place       History of Present Illness  35 y.o. yo  here for annual exam. No gyn complaints. Doing well on OCP. Even if she takes a break from continuous she does not bleed. Occasional hot flashes. Discussed. We talked about other options like vasectomy vs BTL with ablation vs Mirena ( had HA on Mirena) pt will just do OCP for now. All questions answered     I explained new pap and HPV guidelines. Will do pap and HPV test today. Will repeat pap and HPV every 3 years. Answered all questions. Patient agrees.          Past Medical History:   Diagnosis Date    Esophageal reflux     Migraine        History reviewed. No pertinent surgical history.    OB History    Para Term  AB Living   3 2 2   1 2   SAB TAB Ectopic Multiple Live Births           2      # Outcome Date GA Lbr Maximus/2nd Weight Sex Delivery Anes PTL Lv   3 Term 10/25/07   3.969 kg (8 lb 12 oz) M Vag-Spont   BONIFACIO   2 Term 01   4.026 kg (8 lb 14 oz) F Vag-Spont   BONIFACIO   1 AB                No LMP recorded. (Menstrual status: Birth Control).   Date of Last Pap: 2020    Review of Systems  Review of Systems   Constitutional: Negative for fatigue and unexpected weight change.   Respiratory: Negative for shortness of breath.    Cardiovascular: Negative for chest pain.   Gastrointestinal: Negative for abdominal pain, constipation, diarrhea, nausea and vomiting.   Genitourinary: Negative for dysuria.   Musculoskeletal: Negative for back pain.   Skin: Negative for rash.   Neurological: Negative for headaches.   Hematological: Does not bruise/bleed easily.   Psychiatric/Behavioral: Negative for behavioral problems.        Objective:   Physical Exam:   Constitutional: She is oriented to person, place, and time. She  appears well-developed and well-nourished. No distress.        Pulmonary/Chest: She exhibits no mass. Right breast exhibits no mass, no nipple discharge, no skin change, no tenderness, no bleeding and no swelling. Left breast exhibits no mass, no nipple discharge, no skin change, no tenderness, no bleeding and no swelling. Breasts are symmetrical.        Abdominal: Soft. Normal appearance and bowel sounds are normal. She exhibits no distension and no mass. There is no abdominal tenderness. There is no rebound.     Genitourinary:    Vagina and uterus normal.   There is no rash, tenderness, lesion or injury on the right labia. There is no rash, tenderness, lesion or injury on the left labia. Uterus is not deviated, not enlarged, not fixed, not tender, not hosting fibroids and not experiencing uterine prolapse. Cervix is normal. Right adnexum displays no mass, no tenderness and no fullness. Left adnexum displays no mass, no tenderness and no fullness. No erythema, tenderness, rectocele, cystocele or unspecified prolapse of vaginal walls in the vagina. Cervix exhibits no motion tenderness, no discharge and no friability. negative for vaginal discharge          Musculoskeletal: Normal range of motion and moves all extremeties.      Lymphadenopathy:        Right: No supraclavicular adenopathy present.        Left: No supraclavicular adenopathy present.    Neurological: She is alert and oriented to person, place, and time.    Skin: Skin is warm and dry.    Psychiatric: She has a normal mood and affect. Her behavior is normal. Judgment normal.        Assessment/ Plan:     1. Encounter for screening for human papillomavirus (HPV)  HPV High Risk Genotypes, PCR   2. Encounter for Papanicolaou smear for cervical cancer screening  Liquid-Based Pap Smear, Screening       Follow up in about 1 year (around 12/17/2021) for Annual exam.

## 2020-12-28 LAB
HPV HR 12 DNA SPEC QL NAA+PROBE: NEGATIVE
HPV16 AG SPEC QL: NEGATIVE
HPV18 DNA SPEC QL NAA+PROBE: NEGATIVE

## 2021-01-26 LAB
FINAL PATHOLOGIC DIAGNOSIS: NORMAL
Lab: NORMAL

## 2021-03-27 ENCOUNTER — IMMUNIZATION (OUTPATIENT)
Dept: PRIMARY CARE CLINIC | Facility: CLINIC | Age: 36
End: 2021-03-27
Payer: COMMERCIAL

## 2021-03-27 DIAGNOSIS — Z23 NEED FOR VACCINATION: Primary | ICD-10-CM

## 2021-03-27 PROCEDURE — 91300 COVID-19, MRNA, LNP-S, PF, 30 MCG/0.3 ML DOSE VACCINE: CPT | Mod: PBBFAC | Performed by: EMERGENCY MEDICINE

## 2021-04-17 ENCOUNTER — IMMUNIZATION (OUTPATIENT)
Dept: PRIMARY CARE CLINIC | Facility: CLINIC | Age: 36
End: 2021-04-17
Payer: COMMERCIAL

## 2021-04-17 DIAGNOSIS — Z23 NEED FOR VACCINATION: Primary | ICD-10-CM

## 2021-04-17 PROCEDURE — 91300 COVID-19, MRNA, LNP-S, PF, 30 MCG/0.3 ML DOSE VACCINE: CPT | Mod: PBBFAC | Performed by: FAMILY MEDICINE

## 2021-04-17 PROCEDURE — 0002A COVID-19, MRNA, LNP-S, PF, 30 MCG/0.3 ML DOSE VACCINE: CPT | Mod: PBBFAC | Performed by: FAMILY MEDICINE

## 2021-09-10 ENCOUNTER — PATIENT MESSAGE (OUTPATIENT)
Dept: OBSTETRICS AND GYNECOLOGY | Facility: CLINIC | Age: 36
End: 2021-09-10

## 2021-09-10 RX ORDER — NORGESTIMATE AND ETHINYL ESTRADIOL 0.25-0.035
KIT ORAL
Qty: 112 TABLET | Refills: 0 | Status: SHIPPED | OUTPATIENT
Start: 2021-09-10 | End: 2021-12-23

## 2021-12-23 ENCOUNTER — OFFICE VISIT (OUTPATIENT)
Dept: OBSTETRICS AND GYNECOLOGY | Facility: CLINIC | Age: 36
End: 2021-12-23
Attending: OBSTETRICS & GYNECOLOGY
Payer: COMMERCIAL

## 2021-12-23 VITALS — BODY MASS INDEX: 40.98 KG/M2 | WEIGHT: 240.06 LBS | HEIGHT: 64 IN

## 2021-12-23 DIAGNOSIS — Z01.419 ENCOUNTER FOR GYNECOLOGICAL EXAMINATION (GENERAL) (ROUTINE) WITHOUT ABNORMAL FINDINGS: Primary | ICD-10-CM

## 2021-12-23 DIAGNOSIS — R30.0 DYSURIA: ICD-10-CM

## 2021-12-23 PROCEDURE — 99395 PREV VISIT EST AGE 18-39: CPT | Mod: S$GLB,,, | Performed by: OBSTETRICS & GYNECOLOGY

## 2021-12-23 PROCEDURE — 1159F PR MEDICATION LIST DOCUMENTED IN MEDICAL RECORD: ICD-10-PCS | Mod: CPTII,S$GLB,, | Performed by: OBSTETRICS & GYNECOLOGY

## 2021-12-23 PROCEDURE — 99999 PR PBB SHADOW E&M-EST. PATIENT-LVL III: CPT | Mod: PBBFAC,,, | Performed by: OBSTETRICS & GYNECOLOGY

## 2021-12-23 PROCEDURE — 87086 URINE CULTURE/COLONY COUNT: CPT | Performed by: OBSTETRICS & GYNECOLOGY

## 2021-12-23 PROCEDURE — 3008F BODY MASS INDEX DOCD: CPT | Mod: CPTII,S$GLB,, | Performed by: OBSTETRICS & GYNECOLOGY

## 2021-12-23 PROCEDURE — 99395 PR PREVENTIVE VISIT,EST,18-39: ICD-10-PCS | Mod: S$GLB,,, | Performed by: OBSTETRICS & GYNECOLOGY

## 2021-12-23 PROCEDURE — 1159F MED LIST DOCD IN RCRD: CPT | Mod: CPTII,S$GLB,, | Performed by: OBSTETRICS & GYNECOLOGY

## 2021-12-23 PROCEDURE — 1160F RVW MEDS BY RX/DR IN RCRD: CPT | Mod: CPTII,S$GLB,, | Performed by: OBSTETRICS & GYNECOLOGY

## 2021-12-23 PROCEDURE — 1160F PR REVIEW ALL MEDS BY PRESCRIBER/CLIN PHARMACIST DOCUMENTED: ICD-10-PCS | Mod: CPTII,S$GLB,, | Performed by: OBSTETRICS & GYNECOLOGY

## 2021-12-23 PROCEDURE — 3008F PR BODY MASS INDEX (BMI) DOCUMENTED: ICD-10-PCS | Mod: CPTII,S$GLB,, | Performed by: OBSTETRICS & GYNECOLOGY

## 2021-12-23 PROCEDURE — 99999 PR PBB SHADOW E&M-EST. PATIENT-LVL III: ICD-10-PCS | Mod: PBBFAC,,, | Performed by: OBSTETRICS & GYNECOLOGY

## 2021-12-23 RX ORDER — NORGESTIMATE AND ETHINYL ESTRADIOL 0.25-0.035
1 KIT ORAL DAILY
Qty: 84 TABLET | Refills: 3 | Status: CANCELLED | OUTPATIENT
Start: 2021-12-23

## 2021-12-23 RX ORDER — NORGESTIMATE AND ETHINYL ESTRADIOL 0.25-0.035
KIT ORAL
COMMUNITY
Start: 2021-03-11 | End: 2021-12-23 | Stop reason: SDUPTHER

## 2021-12-23 RX ORDER — NORGESTIMATE AND ETHINYL ESTRADIOL 0.25-0.035
1 KIT ORAL DAILY
Qty: 112 TABLET | Refills: 3 | Status: SHIPPED | OUTPATIENT
Start: 2021-12-23 | End: 2022-02-14 | Stop reason: SDUPTHER

## 2021-12-24 LAB — BACTERIA UR CULT: NO GROWTH

## 2022-01-10 ENCOUNTER — IMMUNIZATION (OUTPATIENT)
Dept: PRIMARY CARE CLINIC | Facility: CLINIC | Age: 37
End: 2022-01-10
Payer: COMMERCIAL

## 2022-01-10 DIAGNOSIS — Z23 NEED FOR VACCINATION: Primary | ICD-10-CM

## 2022-01-10 PROCEDURE — 0004A COVID-19, MRNA, LNP-S, PF, 30 MCG/0.3 ML DOSE VACCINE: CPT | Mod: CV19,PBBFAC | Performed by: INTERNAL MEDICINE

## 2022-02-14 DIAGNOSIS — Z01.419 ENCOUNTER FOR GYNECOLOGICAL EXAMINATION (GENERAL) (ROUTINE) WITHOUT ABNORMAL FINDINGS: ICD-10-CM

## 2022-02-14 RX ORDER — NORGESTIMATE AND ETHINYL ESTRADIOL 0.25-0.035
1 KIT ORAL DAILY
Qty: 112 TABLET | Refills: 3 | Status: SHIPPED | OUTPATIENT
Start: 2022-02-14 | End: 2022-08-12 | Stop reason: SDUPTHER

## 2022-03-15 ENCOUNTER — PATIENT MESSAGE (OUTPATIENT)
Dept: OBSTETRICS AND GYNECOLOGY | Facility: CLINIC | Age: 37
End: 2022-03-15
Payer: COMMERCIAL

## 2022-03-15 RX ORDER — NYSTATIN 100000 U/G
CREAM TOPICAL 2 TIMES DAILY
Qty: 15 G | Refills: 0 | Status: SHIPPED | OUTPATIENT
Start: 2022-03-15 | End: 2022-08-06

## 2022-08-12 DIAGNOSIS — Z01.419 ENCOUNTER FOR GYNECOLOGICAL EXAMINATION (GENERAL) (ROUTINE) WITHOUT ABNORMAL FINDINGS: ICD-10-CM

## 2022-08-12 RX ORDER — NORGESTIMATE AND ETHINYL ESTRADIOL 0.25-0.035
1 KIT ORAL DAILY
Qty: 112 TABLET | Refills: 0 | Status: SHIPPED | OUTPATIENT
Start: 2022-08-12 | End: 2023-02-17

## 2022-12-22 ENCOUNTER — PATIENT MESSAGE (OUTPATIENT)
Dept: OBSTETRICS AND GYNECOLOGY | Facility: CLINIC | Age: 37
End: 2022-12-22
Payer: COMMERCIAL

## 2022-12-22 RX ORDER — FLUCONAZOLE 150 MG/1
150 TABLET ORAL ONCE
Qty: 2 TABLET | Refills: 0 | Status: SHIPPED | OUTPATIENT
Start: 2022-12-22 | End: 2022-12-22

## 2023-03-23 ENCOUNTER — OFFICE VISIT (OUTPATIENT)
Dept: OBSTETRICS AND GYNECOLOGY | Facility: CLINIC | Age: 38
End: 2023-03-23
Attending: OBSTETRICS & GYNECOLOGY
Payer: COMMERCIAL

## 2023-03-23 VITALS
BODY MASS INDEX: 38.2 KG/M2 | DIASTOLIC BLOOD PRESSURE: 80 MMHG | SYSTOLIC BLOOD PRESSURE: 120 MMHG | HEIGHT: 64 IN | WEIGHT: 223.75 LBS

## 2023-03-23 DIAGNOSIS — Z12.31 VISIT FOR SCREENING MAMMOGRAM: Primary | ICD-10-CM

## 2023-03-23 DIAGNOSIS — Z01.419 ENCOUNTER FOR GYNECOLOGICAL EXAMINATION (GENERAL) (ROUTINE) WITHOUT ABNORMAL FINDINGS: ICD-10-CM

## 2023-03-23 PROCEDURE — 3079F PR MOST RECENT DIASTOLIC BLOOD PRESSURE 80-89 MM HG: ICD-10-PCS | Mod: CPTII,S$GLB,, | Performed by: OBSTETRICS & GYNECOLOGY

## 2023-03-23 PROCEDURE — 1159F PR MEDICATION LIST DOCUMENTED IN MEDICAL RECORD: ICD-10-PCS | Mod: CPTII,S$GLB,, | Performed by: OBSTETRICS & GYNECOLOGY

## 2023-03-23 PROCEDURE — 3008F PR BODY MASS INDEX (BMI) DOCUMENTED: ICD-10-PCS | Mod: CPTII,S$GLB,, | Performed by: OBSTETRICS & GYNECOLOGY

## 2023-03-23 PROCEDURE — 3074F SYST BP LT 130 MM HG: CPT | Mod: CPTII,S$GLB,, | Performed by: OBSTETRICS & GYNECOLOGY

## 2023-03-23 PROCEDURE — 99999 PR PBB SHADOW E&M-EST. PATIENT-LVL III: CPT | Mod: PBBFAC,,, | Performed by: OBSTETRICS & GYNECOLOGY

## 2023-03-23 PROCEDURE — 99395 PREV VISIT EST AGE 18-39: CPT | Mod: S$GLB,,, | Performed by: OBSTETRICS & GYNECOLOGY

## 2023-03-23 PROCEDURE — 99395 PR PREVENTIVE VISIT,EST,18-39: ICD-10-PCS | Mod: S$GLB,,, | Performed by: OBSTETRICS & GYNECOLOGY

## 2023-03-23 PROCEDURE — 3079F DIAST BP 80-89 MM HG: CPT | Mod: CPTII,S$GLB,, | Performed by: OBSTETRICS & GYNECOLOGY

## 2023-03-23 PROCEDURE — 3074F PR MOST RECENT SYSTOLIC BLOOD PRESSURE < 130 MM HG: ICD-10-PCS | Mod: CPTII,S$GLB,, | Performed by: OBSTETRICS & GYNECOLOGY

## 2023-03-23 PROCEDURE — 3008F BODY MASS INDEX DOCD: CPT | Mod: CPTII,S$GLB,, | Performed by: OBSTETRICS & GYNECOLOGY

## 2023-03-23 PROCEDURE — 99999 PR PBB SHADOW E&M-EST. PATIENT-LVL III: ICD-10-PCS | Mod: PBBFAC,,, | Performed by: OBSTETRICS & GYNECOLOGY

## 2023-03-23 PROCEDURE — 1159F MED LIST DOCD IN RCRD: CPT | Mod: CPTII,S$GLB,, | Performed by: OBSTETRICS & GYNECOLOGY

## 2023-03-23 RX ORDER — PANTOPRAZOLE SODIUM 40 MG/1
TABLET, DELAYED RELEASE ORAL
COMMUNITY
Start: 2023-03-21 | End: 2024-03-06

## 2023-03-23 RX ORDER — NORGESTIMATE AND ETHINYL ESTRADIOL 0.25-0.035
1 KIT ORAL DAILY
Qty: 112 TABLET | Refills: 4 | Status: SHIPPED | OUTPATIENT
Start: 2023-03-23 | End: 2024-01-18

## 2023-03-23 NOTE — PROGRESS NOTES
Subjective:       Patient ID: Madhu Dela Cruz is a 37 y.o. female.    Chief Complaint:  Annual Exam (Last pap/hpv  normal, mammo 2019 birads 1)        History of Present Illness  Madhu Dela Cruz is a 37 y.o. female  who presents for annual. Overall doing well. On continuous pills. Lost weight with Ozempic. Explained it can lower the efficacy of OCP. Kids doing well. All questions answered    No LMP recorded. (Menstrual status: Birth Control).   Date of Last Pap: 2021    Review of Systems  Review of Systems   Constitutional:  Negative for chills and fever.      Objective:   Physical Exam:   Constitutional: She is oriented to person, place, and time. Vital signs are normal. She appears well-developed and well-nourished. No distress.        Pulmonary/Chest: She exhibits no mass. Right breast exhibits no mass, no nipple discharge, no skin change, no tenderness, no bleeding and no swelling. Left breast exhibits no mass, no nipple discharge, no skin change, no tenderness, no bleeding and no swelling. Breasts are symmetrical.        Abdominal: Soft. Bowel sounds are normal. She exhibits no distension and no mass. There is no abdominal tenderness. There is no rebound.     Genitourinary:    Vagina and uterus normal.   There is no rash, tenderness, lesion or injury on the right labia. There is no rash, tenderness, lesion or injury on the left labia. Cervix is normal. Right adnexum displays no mass, no tenderness and no fullness. Left adnexum displays no mass, no tenderness and no fullness. No erythema,  no vaginal discharge, tenderness, rectocele, cystocele or unspecified prolapse of vaginal walls in the vagina. Cervix exhibits no motion tenderness, no discharge and no friability. Uterus is not deviated, not enlarged, not fixed, not tender and not hosting fibroids.           Musculoskeletal: Normal range of motion and moves all extremeties.      Lymphadenopathy:        Right: No supraclavicular adenopathy  present.        Left: No supraclavicular adenopathy present.    Neurological: She is alert and oriented to person, place, and time.    Skin: Skin is warm and dry.    Psychiatric: She has a normal mood and affect. Her behavior is normal. Judgment normal.      Assessment/ Plan:     1. Visit for screening mammogram  Mammo Digital Screening Bilat w/ Daniel      2. Encounter for gynecological examination (general) (routine) without abnormal findings  norgestimate-ethinyl estradioL (ESTARYLLA) 0.25-35 mg-mcg per tablet          No follow-ups on file.    As of April 1, 2021, the Cures Act has been passed nationally. This new law requires that all doctors progress notes, lab results, pathology reports and radiology reports be released IMMEDIATELY to the patient in the patient portal. That means that the results are released to you at the EXACT same time they are released to me. Therefore, with all of the patients that I have I am not able to reply to each patient exactly when the results come in. So there will be a delay from when you see the results to when I see them and have time to come up with a response to send you. Also I only see these results when I am on the computer at work. So if the results come in over the weekend or after 5 pm of a work day, I will not see them until the next business day. As you can tell, this is a challenge as a physician to give every patient the quick response they hope for and deserve. So please be patient! Thanks for understanding, Dr. Cullen

## 2023-04-18 ENCOUNTER — OFFICE VISIT (OUTPATIENT)
Dept: URGENT CARE | Facility: CLINIC | Age: 38
End: 2023-04-18
Payer: COMMERCIAL

## 2023-04-18 VITALS
RESPIRATION RATE: 18 BRPM | TEMPERATURE: 103 F | BODY MASS INDEX: 38.07 KG/M2 | HEIGHT: 64 IN | DIASTOLIC BLOOD PRESSURE: 75 MMHG | HEART RATE: 118 BPM | WEIGHT: 223 LBS | OXYGEN SATURATION: 96 % | SYSTOLIC BLOOD PRESSURE: 116 MMHG

## 2023-04-18 DIAGNOSIS — R05.9 COUGH, UNSPECIFIED TYPE: ICD-10-CM

## 2023-04-18 DIAGNOSIS — H66.91 RIGHT OTITIS MEDIA, UNSPECIFIED OTITIS MEDIA TYPE: ICD-10-CM

## 2023-04-18 DIAGNOSIS — R50.9 FEVER, UNSPECIFIED FEVER CAUSE: Primary | ICD-10-CM

## 2023-04-18 DIAGNOSIS — R06.00 PND (PAROXYSMAL NOCTURNAL DYSPNEA): ICD-10-CM

## 2023-04-18 DIAGNOSIS — J01.90 ACUTE NON-RECURRENT SINUSITIS, UNSPECIFIED LOCATION: ICD-10-CM

## 2023-04-18 DIAGNOSIS — J02.9 SORE THROAT: ICD-10-CM

## 2023-04-18 LAB
BILIRUB UR QL STRIP: NEGATIVE
CTP QC/QA: YES
CTP QC/QA: YES
GLUCOSE UR QL STRIP: NEGATIVE
KETONES UR QL STRIP: POSITIVE
LEUKOCYTE ESTERASE UR QL STRIP: NEGATIVE
MOLECULAR STREP A: NEGATIVE
PH, POC UA: 5 (ref 5–8)
POC BLOOD, URINE: POSITIVE
POC NITRATES, URINE: NEGATIVE
PROT UR QL STRIP: NEGATIVE
SARS-COV-2 AG RESP QL IA.RAPID: NEGATIVE
SP GR UR STRIP: 1.02 (ref 1–1.03)
UROBILINOGEN UR STRIP-ACNC: NORMAL (ref 0.1–1.1)

## 2023-04-18 PROCEDURE — 71046 XR CHEST PA AND LATERAL: ICD-10-PCS | Mod: S$GLB,,, | Performed by: RADIOLOGY

## 2023-04-18 PROCEDURE — 71046 X-RAY EXAM CHEST 2 VIEWS: CPT | Mod: S$GLB,,, | Performed by: RADIOLOGY

## 2023-04-18 PROCEDURE — 99214 PR OFFICE/OUTPT VISIT, EST, LEVL IV, 30-39 MIN: ICD-10-PCS | Mod: S$GLB,,, | Performed by: FAMILY MEDICINE

## 2023-04-18 PROCEDURE — 87651 POCT STREP A MOLECULAR: ICD-10-PCS | Mod: QW,S$GLB,, | Performed by: FAMILY MEDICINE

## 2023-04-18 PROCEDURE — 99214 OFFICE O/P EST MOD 30 MIN: CPT | Mod: S$GLB,,, | Performed by: FAMILY MEDICINE

## 2023-04-18 PROCEDURE — 81003 POCT URINALYSIS, DIPSTICK, AUTOMATED, W/O SCOPE: ICD-10-PCS | Mod: QW,S$GLB,, | Performed by: FAMILY MEDICINE

## 2023-04-18 PROCEDURE — 87651 STREP A DNA AMP PROBE: CPT | Mod: QW,S$GLB,, | Performed by: FAMILY MEDICINE

## 2023-04-18 PROCEDURE — 87811 SARS CORONAVIRUS 2 ANTIGEN POCT, MANUAL READ: ICD-10-PCS | Mod: QW,S$GLB,, | Performed by: FAMILY MEDICINE

## 2023-04-18 PROCEDURE — 87811 SARS-COV-2 COVID19 W/OPTIC: CPT | Mod: QW,S$GLB,, | Performed by: FAMILY MEDICINE

## 2023-04-18 PROCEDURE — 81003 URINALYSIS AUTO W/O SCOPE: CPT | Mod: QW,S$GLB,, | Performed by: FAMILY MEDICINE

## 2023-04-18 RX ORDER — ACETAMINOPHEN 500 MG
1000 TABLET ORAL
Status: COMPLETED | OUTPATIENT
Start: 2023-04-18 | End: 2023-04-18

## 2023-04-18 RX ORDER — AMOXICILLIN AND CLAVULANATE POTASSIUM 875; 125 MG/1; MG/1
1 TABLET, FILM COATED ORAL 2 TIMES DAILY
Qty: 14 TABLET | Refills: 0 | Status: SHIPPED | OUTPATIENT
Start: 2023-04-18 | End: 2023-04-25

## 2023-04-18 RX ADMIN — Medication 1000 MG: at 06:04

## 2023-04-18 NOTE — PROGRESS NOTES
"Subjective:      Patient ID: Madhu Dela Cruz is a 37 y.o. female.    Vitals:  height is 5' 4" (1.626 m) and weight is 101.2 kg (223 lb). Her temperature is 103 °F (39.4 °C) (abnormal). Her blood pressure is 116/75 and her pulse is 118 (abnormal). Her respiration is 18 and oxygen saturation is 96%.     Chief Complaint: Fever    Patient presents with c/o fever, chills, cough, sore throat, running nose, sinus and chest congestion for 4 days. Pt denies  CP, SOB,  weakness/dizziness, N/V, diarrhea, abdominal pain, dysuria, loss of smell or taste.        Fever   This is a new problem. The current episode started in the past 7 days (friday). The problem has been unchanged. The maximum temperature noted was 100 to 100.9 F. The temperature was taken using an oral thermometer. Associated symptoms include congestion, coughing, headaches, nausea, sleepiness and a sore throat. Pertinent negatives include no abdominal pain, chest pain, diarrhea, ear pain, muscle aches, rash, urinary pain, vomiting or wheezing. She has tried NSAIDs (theraflu,zyrtec) for the symptoms. The treatment provided mild relief.   Risk factors: no contaminated food, no contaminated water, no hx of cancer, no immunosuppression, no occupational exposure, no recent sickness, no recent travel and no sick contacts      Constitution: Positive for fatigue and fever.   HENT:  Positive for congestion, postnasal drip and sore throat. Negative for ear pain, sinus pain and sinus pressure.    Cardiovascular:  Negative for chest pain.   Respiratory:  Positive for cough. Negative for wheezing.    Gastrointestinal:  Positive for nausea. Negative for abdominal pain, vomiting and diarrhea.   Genitourinary:  Negative for dysuria.   Skin:  Negative for rash.   Neurological:  Positive for headaches.    Objective:     Physical Exam   Constitutional: She is oriented to person, place, and time. She appears well-developed. She is cooperative.  Non-toxic appearance. She does not " appear ill. No distress.   HENT:   Head: Normocephalic and atraumatic.      Comments:   Positive right TM erythema and dullness  Ears:   Right Ear: Hearing, external ear and ear canal normal. impacted cerumen  Left Ear: Hearing, tympanic membrane, external ear and ear canal normal. impacted cerumen  Nose: Congestion present. No mucosal edema, rhinorrhea or nasal deformity. No epistaxis. Right sinus exhibits no maxillary sinus tenderness and no frontal sinus tenderness. Left sinus exhibits no maxillary sinus tenderness and no frontal sinus tenderness.   Mouth/Throat: Uvula is midline, oropharynx is clear and moist and mucous membranes are normal. No trismus in the jaw. Normal dentition. No uvula swelling. No oropharyngeal exudate, posterior oropharyngeal edema or posterior oropharyngeal erythema.   Eyes: Conjunctivae and lids are normal. No scleral icterus.   Neck: Trachea normal and phonation normal. Neck supple. No edema present. No erythema present. No neck rigidity present.   Cardiovascular: Normal rate, regular rhythm, normal heart sounds and normal pulses.   No murmur heard.  Pulmonary/Chest: Effort normal and breath sounds normal. No stridor. No respiratory distress. She has no decreased breath sounds. She has no wheezes. She has no rhonchi. She has no rales.   Abdominal: Normal appearance. She exhibits no distension. There is no abdominal tenderness. There is no left CVA tenderness and no right CVA tenderness.   Musculoskeletal: Normal range of motion.         General: No deformity. Normal range of motion.      Cervical back: She exhibits no tenderness.   Lymphadenopathy:     She has no cervical adenopathy.   Neurological: She is alert and oriented to person, place, and time. She exhibits normal muscle tone. Coordination normal.   Skin: Skin is warm, dry, intact, not diaphoretic and not pale.   Psychiatric: Her speech is normal and behavior is normal. Judgment and thought content normal.   Nursing note and  vitals reviewed.    Assessment:     1. Fever, unspecified fever cause    2. Sore throat    3. Cough, unspecified type    4. PND (paroxysmal nocturnal dyspnea)    5. Acute non-recurrent sinusitis, unspecified location    6. Right otitis media, unspecified otitis media type        Plan:   CXR is negative for pneumonia or acute process.  Discussed results/diagnosis/plan with patient in clinic. Advised to f/u with PCP within 2-5 days. ER precautions given if symptoms get any worse. All questions answered. Patient verbally understood and agreed with treatment plan.     Fever, unspecified fever cause  -     SARS Coronavirus 2 Antigen, POCT Manual Read  -     POCT Strep A, Molecular  -     POCT Urinalysis, Dipstick, Automated, W/O Scope  -     X-Ray Chest PA And Lateral; Future; Expected date: 04/18/2023    Sore throat  -     POCT Strep A, Molecular    Cough, unspecified type  -     X-Ray Chest PA And Lateral; Future; Expected date: 04/18/2023    PND (paroxysmal nocturnal dyspnea)    Acute non-recurrent sinusitis, unspecified location    Right otitis media, unspecified otitis media type    Other orders  -     acetaminophen tablet 1,000 mg  -     amoxicillin-clavulanate 875-125mg (AUGMENTIN) 875-125 mg per tablet; Take 1 tablet by mouth 2 (two) times daily. for 7 days  Dispense: 14 tablet; Refill: 0

## 2023-06-07 ENCOUNTER — HOSPITAL ENCOUNTER (OUTPATIENT)
Dept: RADIOLOGY | Facility: OTHER | Age: 38
Discharge: HOME | End: 2023-06-07
Attending: OBSTETRICS & GYNECOLOGY
Payer: COMMERCIAL

## 2023-06-07 DIAGNOSIS — Z12.31 VISIT FOR SCREENING MAMMOGRAM: ICD-10-CM

## 2023-06-07 PROCEDURE — 77067 SCR MAMMO BI INCL CAD: CPT | Mod: 26,,, | Performed by: RADIOLOGY

## 2023-06-07 PROCEDURE — 77067 SCR MAMMO BI INCL CAD: CPT | Mod: TC

## 2023-06-07 PROCEDURE — 77063 MAMMO DIGITAL SCREENING BILAT WITH TOMO: ICD-10-PCS | Mod: 26,,, | Performed by: RADIOLOGY

## 2023-06-07 PROCEDURE — 77067 MAMMO DIGITAL SCREENING BILAT WITH TOMO: ICD-10-PCS | Mod: 26,,, | Performed by: RADIOLOGY

## 2023-06-07 PROCEDURE — 77063 BREAST TOMOSYNTHESIS BI: CPT | Mod: 26,,, | Performed by: RADIOLOGY

## 2023-07-31 ENCOUNTER — PATIENT MESSAGE (OUTPATIENT)
Dept: RESEARCH | Facility: HOSPITAL | Age: 38
End: 2023-07-31
Payer: COMMERCIAL

## 2023-12-21 ENCOUNTER — PATIENT MESSAGE (OUTPATIENT)
Dept: OBSTETRICS AND GYNECOLOGY | Facility: CLINIC | Age: 38
End: 2023-12-21
Payer: COMMERCIAL

## 2023-12-28 ENCOUNTER — OFFICE VISIT (OUTPATIENT)
Dept: OBSTETRICS AND GYNECOLOGY | Facility: CLINIC | Age: 38
End: 2023-12-28
Attending: OBSTETRICS & GYNECOLOGY
Payer: COMMERCIAL

## 2023-12-28 ENCOUNTER — TELEPHONE (OUTPATIENT)
Dept: RADIOLOGY | Facility: HOSPITAL | Age: 38
End: 2023-12-28
Payer: COMMERCIAL

## 2023-12-28 VITALS — BODY MASS INDEX: 35.61 KG/M2 | HEIGHT: 64 IN | WEIGHT: 208.56 LBS

## 2023-12-28 DIAGNOSIS — N63.10 MASS OF RIGHT BREAST, UNSPECIFIED QUADRANT: Primary | ICD-10-CM

## 2023-12-28 PROCEDURE — 99214 PR OFFICE/OUTPT VISIT, EST, LEVL IV, 30-39 MIN: ICD-10-PCS | Mod: S$GLB,,, | Performed by: OBSTETRICS & GYNECOLOGY

## 2023-12-28 PROCEDURE — 99214 OFFICE O/P EST MOD 30 MIN: CPT | Mod: S$GLB,,, | Performed by: OBSTETRICS & GYNECOLOGY

## 2023-12-28 PROCEDURE — 99999 PR PBB SHADOW E&M-EST. PATIENT-LVL III: CPT | Mod: PBBFAC,,, | Performed by: OBSTETRICS & GYNECOLOGY

## 2023-12-28 PROCEDURE — 1159F MED LIST DOCD IN RCRD: CPT | Mod: CPTII,S$GLB,, | Performed by: OBSTETRICS & GYNECOLOGY

## 2023-12-28 PROCEDURE — 3008F PR BODY MASS INDEX (BMI) DOCUMENTED: ICD-10-PCS | Mod: CPTII,S$GLB,, | Performed by: OBSTETRICS & GYNECOLOGY

## 2023-12-28 PROCEDURE — 1159F PR MEDICATION LIST DOCUMENTED IN MEDICAL RECORD: ICD-10-PCS | Mod: CPTII,S$GLB,, | Performed by: OBSTETRICS & GYNECOLOGY

## 2023-12-28 PROCEDURE — 3008F BODY MASS INDEX DOCD: CPT | Mod: CPTII,S$GLB,, | Performed by: OBSTETRICS & GYNECOLOGY

## 2023-12-28 PROCEDURE — 99999 PR PBB SHADOW E&M-EST. PATIENT-LVL III: ICD-10-PCS | Mod: PBBFAC,,, | Performed by: OBSTETRICS & GYNECOLOGY

## 2023-12-28 NOTE — PROGRESS NOTES
"CC: breast lump    Madhu Dela Cruz is a 38 y.o. female  c/o palpable breast lump in the right breast. No increase in caffeine. No nipple discharge. Normal MMG in .     Past Medical History:   Diagnosis Date    Esophageal reflux     Migraine        History reviewed. No pertinent surgical history.    OB History    Para Term  AB Living   3 2 2   1 2   SAB IAB Ectopic Multiple Live Births           2      # Outcome Date GA Lbr Maximus/2nd Weight Sex Delivery Anes PTL Lv   3 Term 10/25/07   3.969 kg (8 lb 12 oz) M Vag-Spont   BONIFACIO   2 Term 01   4.026 kg (8 lb 14 oz) F Vag-Spont   BONIFACIO   1 AB                Family History   Problem Relation Age of Onset    Breast cancer Maternal Aunt     Colon cancer Neg Hx     Ovarian cancer Neg Hx        Ht 5' 4" (1.626 m)   Wt 94.6 kg (208 lb 8.9 oz)   BMI 35.80 kg/m²     ROS:  GENERAL: Denies weight gain or weight loss. Feeling well overall.   SKIN: Denies rash or lesions.   NODES: Denies enlarged lymph nodes.   CHEST: Denies chest pain or shortness of breath.   CARDIOVASCULAR: Denies palpitations or left sided chest pain.   ABDOMEN: No abdominal pain, constipation, diarrhea, nausea, vomiting or rectal bleeding.   BREASTS: The patient performs breast self-examination and denies pain, lumps, or nipple discharge.   HEMATOLOGIC: No easy bruisability or excessive bleeding.  MUSCULOSKELETAL: Denies joint pain or swelling.   NEUROLOGIC: Denies syncope or weakness.   PSYCHIATRIC: Denies depression, anxiety or mood swings.    Physical Exam:    APPEARANCE: Well nourished, well developed, in no acute distress.  AFFECT: WNL, alert and oriented x 3  SKIN: No acne or hirsutism  NODES: No supraclavicular nodes palpated, no axillary nodes palpated  CHEST: Good respiratory effect  BREASTS:  left breast no masses, normal exam right breast- palpable 2 cm, oval firm mass, non mobile, smooth at 3 o'clock superficial.     Physical Exam:        Pulmonary/Chest: Right " breast exhibits mass.                                  ASSESSMENT AND PLAN  Madhu was seen today for breast exam.    Diagnoses and all orders for this visit:    Mass of right breast, unspecified quadrant  -     Mammo Digital Diagnostic Right with Daniel; Future  -     US Breast Right Complete; Future      Based on exam- possible fibroadenoma vs mass, not likely a cyst    No follow-ups on file.

## 2023-12-28 NOTE — Clinical Note
Can you please schedule this patient for diagnostic imaging soon please. Exam is a little suspicious. Patient is anxious. Thank you!!!

## 2023-12-28 NOTE — TELEPHONE ENCOUNTER
----- Message from Dorina Cullen MD sent at 12/28/2023 10:15 AM CST -----  Can you please schedule this patient for diagnostic imaging soon please. Exam is a little suspicious. Patient is anxious. Thank you!!!

## 2024-01-03 ENCOUNTER — HOSPITAL ENCOUNTER (OUTPATIENT)
Dept: RADIOLOGY | Facility: HOSPITAL | Age: 39
Discharge: HOME | End: 2024-01-03
Attending: OBSTETRICS & GYNECOLOGY
Payer: COMMERCIAL

## 2024-01-03 DIAGNOSIS — N63.10 MASS OF RIGHT BREAST, UNSPECIFIED QUADRANT: ICD-10-CM

## 2024-01-03 PROCEDURE — 76642 ULTRASOUND BREAST LIMITED: CPT | Mod: TC,RT

## 2024-01-03 PROCEDURE — 76642 ULTRASOUND BREAST LIMITED: CPT | Mod: 26,RT,, | Performed by: RADIOLOGY

## 2024-01-03 PROCEDURE — 77061 BREAST TOMOSYNTHESIS UNI: CPT | Mod: 26,RT,, | Performed by: RADIOLOGY

## 2024-01-03 PROCEDURE — 77065 DX MAMMO INCL CAD UNI: CPT | Mod: 26,RT,, | Performed by: RADIOLOGY

## 2024-01-03 PROCEDURE — 77061 BREAST TOMOSYNTHESIS UNI: CPT | Mod: TC,RT

## 2024-01-03 PROCEDURE — 77065 DX MAMMO INCL CAD UNI: CPT | Mod: TC,RT

## 2024-01-04 ENCOUNTER — TELEPHONE (OUTPATIENT)
Dept: RADIOLOGY | Facility: HOSPITAL | Age: 39
End: 2024-01-04
Payer: COMMERCIAL

## 2024-01-04 NOTE — TELEPHONE ENCOUNTER
Spoke with patient. Reviewed breast biopsy procedure and reviewed instructions for breast biopsy. Patient expressed understanding and all questions were answered. Provided patient with my phone number to call for any further concerns or questions.   Patient scheduled breast biopsy at the [facility] on 1/8/2024.

## 2024-01-04 NOTE — TELEPHONE ENCOUNTER
Want to add her tomorrow for virtual? 11:30. You can put her in the 11:15 spot but tell her I will not likely log in closer to 11:30

## 2024-01-08 ENCOUNTER — HOSPITAL ENCOUNTER (OUTPATIENT)
Dept: RADIOLOGY | Facility: HOSPITAL | Age: 39
Discharge: HOME | End: 2024-01-08
Attending: OBSTETRICS & GYNECOLOGY
Payer: COMMERCIAL

## 2024-01-08 DIAGNOSIS — N63.10 BREAST MASS, RIGHT: ICD-10-CM

## 2024-01-08 DIAGNOSIS — R92.8 ABNORMAL FINDING ON BREAST IMAGING: ICD-10-CM

## 2024-01-08 PROCEDURE — 88360 TUMOR IMMUNOHISTOCHEM/MANUAL: CPT | Performed by: PATHOLOGY

## 2024-01-08 PROCEDURE — A4648 IMPLANTABLE TISSUE MARKER: HCPCS

## 2024-01-08 PROCEDURE — 88342 IMHCHEM/IMCYTCHM 1ST ANTB: CPT | Mod: 59 | Performed by: PATHOLOGY

## 2024-01-08 PROCEDURE — 77065 DX MAMMO INCL CAD UNI: CPT | Mod: 26,RT,, | Performed by: RADIOLOGY

## 2024-01-08 PROCEDURE — 88305 TISSUE EXAM BY PATHOLOGIST: CPT | Mod: 59 | Performed by: PATHOLOGY

## 2024-01-08 PROCEDURE — 27200937 US BREAST BIOPSY WITH IMAGING EA ADDITIONAL

## 2024-01-08 PROCEDURE — 19084 BX BREAST ADD LESION US IMAG: CPT | Mod: RT,,, | Performed by: RADIOLOGY

## 2024-01-08 PROCEDURE — 88341 IMHCHEM/IMCYTCHM EA ADD ANTB: CPT | Mod: 26,59,, | Performed by: PATHOLOGY

## 2024-01-08 PROCEDURE — 88342 IMHCHEM/IMCYTCHM 1ST ANTB: CPT | Mod: 26,59,, | Performed by: PATHOLOGY

## 2024-01-08 PROCEDURE — 25000003 PHARM REV CODE 250: Performed by: OBSTETRICS & GYNECOLOGY

## 2024-01-08 PROCEDURE — 88360 TUMOR IMMUNOHISTOCHEM/MANUAL: CPT | Mod: 26,,, | Performed by: PATHOLOGY

## 2024-01-08 PROCEDURE — 77065 DX MAMMO INCL CAD UNI: CPT | Mod: TC,RT

## 2024-01-08 PROCEDURE — 27200937 US BREAST BIOPSY WITH IMAGING 1ST SITE RIGHT

## 2024-01-08 PROCEDURE — 88305 TISSUE EXAM BY PATHOLOGIST: CPT | Mod: 26,,, | Performed by: PATHOLOGY

## 2024-01-08 PROCEDURE — 88341 IMHCHEM/IMCYTCHM EA ADD ANTB: CPT | Performed by: PATHOLOGY

## 2024-01-08 PROCEDURE — 19083 BX BREAST 1ST LESION US IMAG: CPT | Mod: RT,,, | Performed by: RADIOLOGY

## 2024-01-08 RX ORDER — LIDOCAINE HYDROCHLORIDE 10 MG/ML
6 INJECTION, SOLUTION EPIDURAL; INFILTRATION; INTRACAUDAL; PERINEURAL ONCE
Status: COMPLETED | OUTPATIENT
Start: 2024-01-08 | End: 2024-01-08

## 2024-01-08 RX ORDER — LIDOCAINE HYDROCHLORIDE AND EPINEPHRINE 20; 10 MG/ML; UG/ML
20 INJECTION, SOLUTION INFILTRATION; PERINEURAL ONCE
Status: COMPLETED | OUTPATIENT
Start: 2024-01-08 | End: 2024-01-08

## 2024-01-08 RX ADMIN — LIDOCAINE HYDROCHLORIDE 6 ML: 10 INJECTION, SOLUTION EPIDURAL; INFILTRATION; INTRACAUDAL; PERINEURAL at 02:01

## 2024-01-08 RX ADMIN — LIDOCAINE HYDROCHLORIDE,EPINEPHRINE BITARTRATE 20 ML: 20; .01 INJECTION, SOLUTION INFILTRATION; PERINEURAL at 02:01

## 2024-01-11 ENCOUNTER — DOCUMENTATION ONLY (OUTPATIENT)
Dept: SURGERY | Facility: CLINIC | Age: 39
End: 2024-01-11
Payer: COMMERCIAL

## 2024-01-11 ENCOUNTER — OFFICE VISIT (OUTPATIENT)
Dept: OBSTETRICS AND GYNECOLOGY | Facility: CLINIC | Age: 39
End: 2024-01-11
Attending: OBSTETRICS & GYNECOLOGY
Payer: COMMERCIAL

## 2024-01-11 DIAGNOSIS — C50.919 BREAST CANCER: Primary | ICD-10-CM

## 2024-01-11 DIAGNOSIS — Z30.09 GENERAL COUNSELING AND ADVICE FOR CONTRACEPTIVE MANAGEMENT: Primary | ICD-10-CM

## 2024-01-11 LAB
COMMENT: ABNORMAL
FINAL PATHOLOGIC DIAGNOSIS: ABNORMAL
GROSS: ABNORMAL
Lab: ABNORMAL
SUPPLEMENTAL DIAGNOSIS: ABNORMAL

## 2024-01-11 PROCEDURE — 99213 OFFICE O/P EST LOW 20 MIN: CPT | Mod: 95,,, | Performed by: OBSTETRICS & GYNECOLOGY

## 2024-01-11 NOTE — NURSING
Called Patient with results of breast biopsy from 1/8/24.  Explained that the biopsy showed IDC . Discussed what this means and that the next step is to meet with a breast surgeon. An appt was made for 1/17/24 with Dr. Vargas.  Reviewed location of breast center. Patient verbalized understanding.  Oncology Navigation   Intake  Date of Diagnosis: 01/08/24  Cancer Type: Breast  Internal / External Referral: Internal  Date of Referral: 01/11/24  Initial Nurse Navigator Contact: 01/11/24  Referral to Initial Contact Timeline (days): 0  Date Worked: 01/11/24  First Appointment Available: 01/17/24  Appointment Date: 01/17/24  First Available Date vs. Scheduled Date (days): 0     Treatment  Current Status: Staging work-up    Surgical Oncologist: Dr. Vargas  Consult Date: 01/17/24          Procedures: Biopsy; MRI  Biopsy Schedule Date: 01/08/24  MRI Schedule Date: 01/16/24             Support Systems: Family members     Acuity      Follow Up  No follow-ups on file.

## 2024-01-11 NOTE — NURSING
Received receptors back and patient is HER2 +. Scheduled apt with Dr. Blakely on 1/18/24. Answered all questions.  Offered Fertility referral, patient stated that she is ok, that are done having children. Patient verbalized understanding.   Oncology Navigation   Intake  Date of Diagnosis: 01/08/24  Cancer Type: Breast  Internal / External Referral: Internal  Date of Referral: 01/11/24  Initial Nurse Navigator Contact: 01/11/24  Referral to Initial Contact Timeline (days): 0  Date Worked: 01/11/24  First Appointment Available: 01/17/24  Appointment Date: 01/17/24  First Available Date vs. Scheduled Date (days): 0     Treatment  Current Status: Staging work-up    Surgical Oncologist: Dr. Vargas  Consult Date: 01/17/24          Procedures: Biopsy; MRI  Biopsy Schedule Date: 01/08/24  MRI Schedule Date: 01/16/24             Support Systems: Family members     Acuity      Follow Up  No follow-ups on file.

## 2024-01-11 NOTE — PROGRESS NOTES
The patient location is: [place]  The chief complaint leading to consultation is: contraception    Visit type: audiovisual    Face to Face time with patient: 8 min  10 minutes of total time spent on the encounter, which includes face to face time and non-face to face time preparing to see the patient (eg, review of tests), Obtaining and/or reviewing separately obtained history, Documenting clinical information in the electronic or other health record, Independently interpreting results (not separately reported) and communicating results to the patient/family/caregiver, or Care coordination (not separately reported).         Each patient to whom he or she provides medical services by telemedicine is:  (1) informed of the relationship between the physician and patient and the respective role of any other health care provider with respect to management of the patient; and (2) notified that he or she may decline to receive medical services by telemedicine and may withdraw from such care at any time.    Notes: Patient was recently diagnosed with breast cancer. She stopped her OCP about a week ago. Discussed in detail all options including but not limited to Paraguard, condoms, tubal or vasectomy. After discussion she would like to try the Paraguard. Has appt next week with breast surgery and MRI. All questions answered.

## 2024-01-16 ENCOUNTER — HOSPITAL ENCOUNTER (OUTPATIENT)
Dept: RADIOLOGY | Facility: OTHER | Age: 39
Discharge: HOME | End: 2024-01-16
Attending: SURGERY
Payer: COMMERCIAL

## 2024-01-16 DIAGNOSIS — C50.919 BREAST CANCER: ICD-10-CM

## 2024-01-16 PROCEDURE — A9577 INJ MULTIHANCE: HCPCS | Performed by: SURGERY

## 2024-01-16 PROCEDURE — 25500020 PHARM REV CODE 255: Performed by: SURGERY

## 2024-01-16 PROCEDURE — 77049 MRI BREAST C-+ W/CAD BI: CPT | Mod: 26,,, | Performed by: RADIOLOGY

## 2024-01-16 PROCEDURE — 77049 MRI BREAST C-+ W/CAD BI: CPT | Mod: TC

## 2024-01-16 RX ADMIN — GADOBENATE DIMEGLUMINE 18 ML: 529 INJECTION, SOLUTION INTRAVENOUS at 05:01

## 2024-01-17 ENCOUNTER — DOCUMENTATION ONLY (OUTPATIENT)
Dept: SURGERY | Facility: CLINIC | Age: 39
End: 2024-01-17
Payer: COMMERCIAL

## 2024-01-17 ENCOUNTER — OFFICE VISIT (OUTPATIENT)
Dept: SURGERY | Facility: CLINIC | Age: 39
End: 2024-01-17
Payer: COMMERCIAL

## 2024-01-17 ENCOUNTER — LAB VISIT (OUTPATIENT)
Dept: LAB | Facility: HOSPITAL | Age: 39
End: 2024-01-17
Attending: SURGERY
Payer: COMMERCIAL

## 2024-01-17 VITALS
HEART RATE: 88 BPM | DIASTOLIC BLOOD PRESSURE: 71 MMHG | WEIGHT: 208 LBS | BODY MASS INDEX: 35.51 KG/M2 | SYSTOLIC BLOOD PRESSURE: 121 MMHG | HEIGHT: 64 IN

## 2024-01-17 DIAGNOSIS — Z17.0 MALIGNANT NEOPLASM OF UPPER-OUTER QUADRANT OF RIGHT BREAST IN FEMALE, ESTROGEN RECEPTOR POSITIVE: ICD-10-CM

## 2024-01-17 DIAGNOSIS — C50.411 MALIGNANT NEOPLASM OF UPPER-OUTER QUADRANT OF RIGHT BREAST IN FEMALE, ESTROGEN RECEPTOR POSITIVE: Primary | ICD-10-CM

## 2024-01-17 DIAGNOSIS — C50.919 BREAST CANCER: Primary | ICD-10-CM

## 2024-01-17 DIAGNOSIS — Z17.0 MALIGNANT NEOPLASM OF UPPER-OUTER QUADRANT OF RIGHT BREAST IN FEMALE, ESTROGEN RECEPTOR POSITIVE: Primary | ICD-10-CM

## 2024-01-17 DIAGNOSIS — C50.311 MALIGNANT NEOPLASM OF LOWER-INNER QUADRANT OF RIGHT BREAST OF FEMALE, ESTROGEN RECEPTOR POSITIVE: Primary | ICD-10-CM

## 2024-01-17 DIAGNOSIS — Z17.0 MALIGNANT NEOPLASM OF LOWER-INNER QUADRANT OF RIGHT BREAST OF FEMALE, ESTROGEN RECEPTOR POSITIVE: Primary | ICD-10-CM

## 2024-01-17 DIAGNOSIS — C50.411 MALIGNANT NEOPLASM OF UPPER-OUTER QUADRANT OF RIGHT BREAST IN FEMALE, ESTROGEN RECEPTOR POSITIVE: ICD-10-CM

## 2024-01-17 PROCEDURE — 36415 COLL VENOUS BLD VENIPUNCTURE: CPT | Performed by: SURGERY

## 2024-01-17 PROCEDURE — 99999 PR PBB SHADOW E&M-EST. PATIENT-LVL III: CPT | Mod: PBBFAC,,, | Performed by: SURGERY

## 2024-01-17 PROCEDURE — 3074F SYST BP LT 130 MM HG: CPT | Mod: CPTII,S$GLB,, | Performed by: SURGERY

## 2024-01-17 PROCEDURE — 1159F MED LIST DOCD IN RCRD: CPT | Mod: CPTII,S$GLB,, | Performed by: SURGERY

## 2024-01-17 PROCEDURE — 3078F DIAST BP <80 MM HG: CPT | Mod: CPTII,S$GLB,, | Performed by: SURGERY

## 2024-01-17 PROCEDURE — 99205 OFFICE O/P NEW HI 60 MIN: CPT | Mod: S$GLB,,, | Performed by: SURGERY

## 2024-01-17 PROCEDURE — 1160F RVW MEDS BY RX/DR IN RCRD: CPT | Mod: CPTII,S$GLB,, | Performed by: SURGERY

## 2024-01-17 PROCEDURE — 3008F BODY MASS INDEX DOCD: CPT | Mod: CPTII,S$GLB,, | Performed by: SURGERY

## 2024-01-17 NOTE — PROGRESS NOTES
Nurse Navigator Note:     Met with patient during her consult with Dr. Vargas.  Patient and I reviewed the information she discussed with Dr. Vargas, including treatment options, diagnosis, and future plans for workup. Patient and I went through the new patient booklet, explained some of the information and why it is provided.     Also offered patient consults with our other specialty clinics: Integrative Oncology, Survivorship and/or Women's Gynecologic needs, our breast physical therapy department for pre-op and post-operative assessments, Oncologic Psychology for psychological support, and Oncologic Nutrition for nutritional counseling. Explained to patient that all of these support services are completely optional. Discussed that physical therapy may call patient to offer pre-op appt, and what that appt would entail.     Patient was given a copy of her appointments, Dr. Vargas's card, and my card. Encouraged her to call me if she has any questions or concerns or would like to schedule any additional appointments. Verbalized understanding of all information.    Oncology Navigation   Intake  Date of Diagnosis: 01/08/24  Cancer Type: Breast  Internal / External Referral: Internal  Date of Referral: 01/11/24  Initial Nurse Navigator Contact: 01/11/24  Referral to Initial Contact Timeline (days): 0  Date Worked: 01/11/24  First Appointment Available: 01/17/24  Appointment Date: 01/17/24  First Available Date vs. Scheduled Date (days): 0     Treatment  Current Status: Staging work-up    Surgical Oncologist: Sam  Consult Date: 01/17/24    Medical Oncologist: nola  Consult Date: 01/18/24       Procedures: Biopsy; MRI  Biopsy Schedule Date: 01/08/24  MRI Schedule Date: 01/16/24    General Referrals: Integrative Medicine          Support Systems: Family members; Spouse/significant other     Acuity  Treatment Tolerability: Has not started treatment yet/treatment fully completed and side effects resolved  Hospitalization  Within the Past Month: 0   Needed: 0  Support: 0  Verbalizes Financial Concerns: 0  Transportation: 0  Psychological Factors (+1 each): Emotional during conversation  History of noncompliance/frequent no shows and cancellations: 0  Verbalizes the need for more education: 0  Navigation Acuity: 2     Follow Up  No follow-ups on file.

## 2024-01-17 NOTE — PROGRESS NOTES
Breast Surgery  [facility]  Department of Surgery      REFERRING PROVIDER: Dorina Cullen MD  [address], [address]  [place] [address]    Chief Complaint: Breast Cancer (New Patient Multi-D Right Breast Cancer.)      Subjective:      Patient ID: Madhu Dela Cruz is a 38 y.o. female who presents with right breast Invasive Ductal Carcinoma.     She initially presented to her OBGYN Dorina Cullen MD on 12/28/23 for complaints of a breast lump in the right breast. She was then sent for diagnostic breast imaging on 1/3/24 for new breast symptoms. This included a diagnostic MMG and Right breast US. This identified right medial breast, 3 o'clock, irregular high density mass with indistinct margins measuring 2.8 cm by MMG. On US, the mass was irregular, hypoechoic, with angular and indistinct margins measuring 2.2 x 1.8 x 2.1 cm, hypervascularity. As well, there was a 10 o'clock mass that was well circumscribed, hypoechoic but determined to be BI-RADS 4. A ultrasound guided biopsy was performed on 1/8/2024 with pathology revealing infiltrating ductal carcinoma of the right breast 3 o'clock mass, the 10 o'clock mass was fibroadenoma.    Findings at that time were the following:   Lesion 1:    Location: 3 o'clock, upper outer quadrant   Clip: Eleanor  Radar Reflector, in expected position  Tumor size: 2.2 x 1.8 x 2.1 cm   Tumor thgthrthathdtheth:th th4th Estrogen Receptor: + (100%)   Progesterone Receptor: + (40%)   Her-2 anne: positive   Lymph node status: no visible right axillary or internal mammary adenopathy   Lymphatic invasion: no lymphovascular invasion   Ki-67: 90%    Lesion 2:    Location: 10 o'clock, lower inner quadrant   Clip: heart marker   Fibroadenoma    Lesion 3 (not biopsied):   Based on MRI appears as an adenoma vs active lymph node within the breast tissue    Patient had noted a change on breast exam.  Patient denies nipple discharge. Patient denies previous breast biopsy. Patient denies  a personal history of breast cancer. Family history includes maternal aunt who had breast cancer, thinks she was older. Father with renal cell carcinoma.      GYN History:  Age of menarche was 10. She is pre-menopausal. Patient reports hormonal therapy for birth control, has since stopped taking birth control pills. Patient is . Age of first live birth was 15. Patient did breast feed her 2nd child but not her first child.    Past Medical History:   Diagnosis Date    Esophageal reflux     Migraine      No past surgical history on file.  Current Outpatient Medications on File Prior to Visit   Medication Sig Dispense Refill    ergocalciferol, vitamin D2, (VITAMIN D ORAL)       LACTOBACILLUS ACIDOPHILUS ORAL       pantoprazole (PROTONIX) 40 MG tablet       norgestimate-ethinyl estradioL (ESTARYLLA) 0.25-35 mg-mcg per tablet Take 1 tablet by mouth once daily. 112 tablet 4    nystatin (MYCOSTATIN) cream APPLY TOPICALLY TO THE AFFECTED AREA TWICE DAILY 15 g 0    omega 3-dha-epa-fish oil 1,200 (144-216) mg Cap       ondansetron (ZOFRAN) 4 MG tablet       semaglutide (OZEMPIC SUBQ)        Current Facility-Administered Medications on File Prior to Visit   Medication Dose Route Frequency Provider Last Rate Last Admin    [COMPLETED] gadobenate dimeglumine (MULTIHANCE) injection 18 mL  18 mL Intravenous ONCE PRN DORYS Vargas MD   18 mL at 24 8301     Social History     Socioeconomic History    Marital status:    Tobacco Use    Smoking status: Never    Smokeless tobacco: Never   Substance and Sexual Activity    Alcohol use: Yes     Comment: 3xwk     Drug use: No    Sexual activity: Yes     Partners: Male     Birth control/protection: OCP     Comment:       Family History   Problem Relation Age of Onset    Breast cancer Maternal Aunt     Colon cancer Neg Hx     Ovarian cancer Neg Hx         Review of Systems   All other systems reviewed and are negative.    Objective:   /71 (BP Location: Right  "arm, Patient Position: Sitting, BP Method: Large (Automatic))   Pulse 88   Ht 5' 4" (1.626 m)   Wt 94.3 kg (208 lb)   LMP  (LMP Unknown)   BMI 35.70 kg/m²     Physical Exam   Constitutional: She is oriented to person, place, and time.   Cardiovascular:  Normal rate.            Pulmonary/Chest: Effort normal. Right breast exhibits mass. Right breast exhibits no inverted nipple, no nipple discharge, no skin change and no tenderness. Left breast exhibits no inverted nipple, no mass, no nipple discharge, no skin change and no tenderness.       Abdominal: Normal appearance.   Musculoskeletal: Lymphadenopathy:      Cervical: No cervical adenopathy.      Upper Body:      Right upper body: No supraclavicular or axillary adenopathy.      Left upper body: No supraclavicular or axillary adenopathy.     Neurological: She is alert and oriented to person, place, and time.   Skin: Skin is warm and dry.     Psychiatric: Her behavior is normal. Mood, judgment and thought content normal.       Radiology review: Images personally reviewed by me in the clinic and shown to the patient during the consultation.     Assessment:       1. Malignant neoplasm of upper-outer quadrant of right breast in female, estrogen receptor positive        Plan:   Right breast, Clinical Stage 1 (T2 N0 MX), invasive ductal carcinoma of the Upper-outer quadrant of breast estrogen receptor positive, progesterone receptor positive, HER2 positive    Multidisciplinary nature of breast cancer care was discussed in detail at today's visit.    We also discussed the role of systemic therapy in the treatment of breast cancer. We discussed that neoadjuvant chemotherapy is utilized for certain patients. The benefits of chemotherapy given in the neoadjuvant setting as opposed to the adjuvant setting are multiple.  The tumour response to chemotherapy can help limit the extent of breast and axillary surgery. The tumor may decrease in size so the tumor can be removed " with a smaller portion of the breast. Lymph nodes that have tumor in them prior to chemotherapy may have no remaining tumor after chemotherapy which may change axillary surgery recommendations. Neoadjuvant chemotherapy also allows us to evaluate the tumor response to treatment based on the pathology findings at surgery. She will be referred to medical oncology to discuss options for neoadjuvant chemotherapy.     We discussed that surgical options would include a lumpectomy versus a mastectomy.  We discussed there is no survival benefit to undergoing a mastectomy compared to lumpectomy.  Based on clinical exam and imaging, she would be a good candidate for breast conservation even prior to neoadjuvant therapy.  Surgical technique and rationale was discussed with the patient.  We discussed that this would be done as a reflector localized excision of the primary tumor along with a surrounding margin of normal breast tissue.  The concept of local control was explained to the patient.  She understands that we would aim to achieve negative margins at the time of initial surgery.  There is however an approximately 15% risk of a positive margin that would require take back for reexcision.  Risks and benefits of the procedure were discussed.  Risks include but are not limited to infection, bleeding, poor cosmesis, positive margins requiring reexcision, and local and distant recurrence. Clips will be placed in the tumor bed to pito the location for radiation, future imaging and/ or re-excisions.    We discussed the option for mastectomy.  We discussed the risks and benefits of mastectomy. Risks include but are not limited to risk of bleeding, infection, poor cosmesis, need for additional surgery, clip placement, scaring, pain including phantom pain, fluid collections, shoulder stiffness, prolonged healing, and recurrence. Reconstruction after mastectomy was discussed.  Reconstructive generally include implant or autologous  tissue reconstruction. There are certain health qualifications that the patient must meet in order to be a candidate for reconstruction.  Based on the location of her tumor she is  a candidate for nipple sparing approach. The plastics surgeon will also evaluate her breast shape and size to determine if it be cosmetically acceptable to spare the nipple.  We discussed the option for contralateral prophylactic mastectomy.  Undergoing a contralateral prophylactic mastectomy does not improve the cure rate for the known cancer or reduce the risk of the that cancer returning.  Overall most women diagnosed with breast cancer have a 2-6% risk of developing a breast cancer in the opposite breast in the next 10 years.  Contralateral prophylactic mastectomy is not 100% protected against development of a new breast cancer.  Undergoing surgery on the contralateral breast has the risk of surgical site complications which could delay cancer treatments.  Most women who proceed with contralateral prophylactic mastectomy do so due to symmetry concerns or for peace of mind.      We discussed the finding of fibroadenoma with focal atypical ductal hyperplasia.  We discussed the atypical lobular hyperplasia has a risk of upstaging to cancer up about 20%.  I would recommend removing this fibroadenoma at the time of her breast surgery.  This could be performed as an excisional biopsy.  Otherwise it would be removed at the time of mastectomy.    She has a 3rd lesion that has not been biopsied.  Would recommend biopsy of the lesion prior to neoadjuvant chemotherapy if the patient is considering breast conservation.    We also discussed axillary staging using sentinel node biopsy.  [place] lymph node biopsies performed utilizing the injection of blue and radioactive dye.  This dye travels to the 1st few lymph nodes that drain the breast.  Lymph nodes that uptake the blue or radioactive dye or are palpable are surgically removed and sent to  pathology.  Typically 1-5 lymph nodes are removed during this procedure although exact numbers vary depending on the patient.  This procedure allows sampling of the lymph nodes most at risk for metastasis.   Recent studies have shown the utilization of this procedure in patients  who have undergone neoadjuvant chemotherapy with a good clinical response. W I will send the lymph nodes to pathology for frozen section at the time of surgery and if no residual disease is identified then no additional axillary surgery will be performed.  However if residual metastatic disease is identified in the targeted sentinel lymph node biopsy then standard of care recommends proceeding with completion axillary lymph node dissection.  We discussed that axillary lymph node dissection involved removing all the level 1 and 2 axillary lymph nodes.  This procedure has increased risk of lymphedema upwards of 30% and increased risk of nerve injury when compared to sentinel lymph node biopsy. The risks and benefits of the two procedures were discussed with the patient.  Risks include but are not limited to lymphedema, bleeding, infection, poor cosmesis, numbness of the incision site or arm, injury to nerves involved in movement of the arm and shoulder, seroma, failure of dye to map, and need for additional surgery.       Based on her medical history she is a low risk of complications. Materials utilized in the surgery include surgical metal clips, suture material, and skin adhesives. These materials can lead to allergic reactions, skin irration, and other complications. Medications utilized in surgery include but are not limited to antibiotics, isosulfan blue dye, and technetium sulfa colloid. There is no expected allergic reaction.     The role of adjuvant radiation therapy following breast conservation surgery was also discussed with the patient. We discussed that when looking at all patient populations risk of local recurrence with  lumpectomy alone is high and radiation therapy is recommended in most cases. We discussed that radiation is also typically recommended for patients with large tumors and certain patients with positive lymph nodes. We discussed that final recommendations on radiation would be based on final surgical pathology. The duration and treatment side effects were discussed with the patient.  She'll be referred to radiation oncology post-operatively for further discussion of her options.     She does meet NCCN guidelines for genetic testing based on her family history. We discussed genetic testing at length and she will like to have them done.    Following her discussion today, she is motivated to undergo neoadjuvant chemotherapy followed by surgery but unsure if she would like partial mastectomy vs total mastectomy. Her new lesion discovered on MRI will also need to be biopsied if she plans to proceed with breast conservation.  Final plan will also be pending response to chemotherapy. Plan for repeat breast imaging and clinic follow up after completion of chemotherapy.     Patient was given patient information binder including [facility] breast cancer treatment brochure.  All her questions were answered.    Total time spent with the patient: 60 minutes.  50 minutes of face to face consultation and 10 minutes of chart review and coordination of care.

## 2024-01-17 NOTE — PROGRESS NOTES
Genetics Lay Navigator Note:    Nurse Navigator : JUNIE Vaughan RN    Met with patient at her consult with Dr. Vargas (1/17/2024), to facilitate genetic testing. Set patient up with [facility] genetic counselor over the phone to complete counseling prior to testing. Patient verbalized understanding to all counseling information. [facility] brochure with number to call with questions or concerns provided to patient as well as my card. Encouraged patient to call me or [facility] at any time.     Lab appointment made and patient escorted with [facility] kit to lab for specimen draw and processing. Patient instructed that results will be provided as soon as they are available. No questions or concerns from patient about plan of care.     [facility] scanned in media and attached to this documentation note.    [employer] Tracking # [id number]

## 2024-01-18 ENCOUNTER — PATIENT MESSAGE (OUTPATIENT)
Dept: ADMINISTRATIVE | Facility: OTHER | Age: 39
End: 2024-01-18
Payer: COMMERCIAL

## 2024-01-18 ENCOUNTER — OFFICE VISIT (OUTPATIENT)
Dept: HEMATOLOGY/ONCOLOGY | Facility: CLINIC | Age: 39
End: 2024-01-18
Payer: COMMERCIAL

## 2024-01-18 VITALS
HEIGHT: 64 IN | HEART RATE: 86 BPM | SYSTOLIC BLOOD PRESSURE: 122 MMHG | WEIGHT: 205.81 LBS | DIASTOLIC BLOOD PRESSURE: 77 MMHG | RESPIRATION RATE: 18 BRPM | BODY MASS INDEX: 35.13 KG/M2 | OXYGEN SATURATION: 100 %

## 2024-01-18 DIAGNOSIS — Z17.0 MALIGNANT NEOPLASM OF UPPER-OUTER QUADRANT OF RIGHT BREAST IN FEMALE, ESTROGEN RECEPTOR POSITIVE: Primary | ICD-10-CM

## 2024-01-18 DIAGNOSIS — C50.411 MALIGNANT NEOPLASM OF UPPER-OUTER QUADRANT OF RIGHT BREAST IN FEMALE, ESTROGEN RECEPTOR POSITIVE: Primary | ICD-10-CM

## 2024-01-18 PROCEDURE — 3008F BODY MASS INDEX DOCD: CPT | Mod: CPTII,S$GLB,, | Performed by: STUDENT IN AN ORGANIZED HEALTH CARE EDUCATION/TRAINING PROGRAM

## 2024-01-18 PROCEDURE — 3078F DIAST BP <80 MM HG: CPT | Mod: CPTII,S$GLB,, | Performed by: STUDENT IN AN ORGANIZED HEALTH CARE EDUCATION/TRAINING PROGRAM

## 2024-01-18 PROCEDURE — 3074F SYST BP LT 130 MM HG: CPT | Mod: CPTII,S$GLB,, | Performed by: STUDENT IN AN ORGANIZED HEALTH CARE EDUCATION/TRAINING PROGRAM

## 2024-01-18 PROCEDURE — 1159F MED LIST DOCD IN RCRD: CPT | Mod: CPTII,S$GLB,, | Performed by: STUDENT IN AN ORGANIZED HEALTH CARE EDUCATION/TRAINING PROGRAM

## 2024-01-18 PROCEDURE — 99999 PR PBB SHADOW E&M-EST. PATIENT-LVL IV: CPT | Mod: PBBFAC,,, | Performed by: STUDENT IN AN ORGANIZED HEALTH CARE EDUCATION/TRAINING PROGRAM

## 2024-01-18 PROCEDURE — 99205 OFFICE O/P NEW HI 60 MIN: CPT | Mod: S$GLB,,, | Performed by: STUDENT IN AN ORGANIZED HEALTH CARE EDUCATION/TRAINING PROGRAM

## 2024-01-18 RX ORDER — DEXAMETHASONE 4 MG/1
TABLET ORAL
Qty: 6 TABLET | Refills: 5 | Status: SHIPPED | OUTPATIENT
Start: 2024-01-18

## 2024-01-18 RX ORDER — OLANZAPINE 5 MG/1
TABLET ORAL
Qty: 4 TABLET | Refills: 5 | Status: SHIPPED | OUTPATIENT
Start: 2024-01-18

## 2024-01-18 RX ORDER — PROCHLORPERAZINE MALEATE 5 MG
10 TABLET ORAL EVERY 6 HOURS PRN
Qty: 20 TABLET | Refills: 5 | Status: SHIPPED | OUTPATIENT
Start: 2024-01-18 | End: 2024-05-30 | Stop reason: SDUPTHER

## 2024-01-18 NOTE — PROGRESS NOTES
[facility]/ [facility]      Chief Complaint:   Encounter Diagnosis   Name Primary?    Malignant neoplasm of upper-outer quadrant of right breast in female, estrogen receptor positive Yes        Cancer Staging   Malignant neoplasm of upper-outer quadrant of right breast in female, estrogen receptor positive  Staging form: Breast, AJCC 8th Edition  - Clinical stage from 2024: Stage IB (cT2, cN0, cM0, G3, ER+, ME+, HER2+) - Signed by Kassie Blakely MD on 2024      HPI:  Madhu Dela Cruz is a 38 y.o. female who presents today for evaluation of newly diagnosed breast cancer. Oncologic history below:     2023 - Screening mammogram with no evidence of malignancy   2024 - Diagnostic mammogram (palpable right breast mass) with  2.2 x 1.8 x 2.1 cm right breast mass   2024 - Needle biopsy of right breast with IDC grade 3, 8mm in greatest dimension, %, ME 40%, HER2 3+, Ki-67 >90%.  24 - Breast MRI with right breast 2.4 cm x 2.1 cm x 2.3 cm mass at the 3 o'clock position. A smaller benign appearing right breast mass. Normal axillary lymph nodes.     Gyn History:   Menarche: Age 10   Menopause: Pre    Age at first pregnancy: 15  : 2nd child, not first  HRT: Was on hormonal birth control, stopped   Does not desire fertility preservation.   Family History: Maternal aunt, diagnosed later in life. No ovarian cancer.     Patient presents today with her . She is nervous/anxious but is otherwise feeling ok. Has a little bit of breast discomfort at night from the biopsy but the pain is not worsening.The mass has not changed since it was first discovered. Denies any skin changes or nipple discharge. 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 She met with breast surgery yesterday and they discussed her surgical options after she gets systemic therapy.     Social History      Tobacco Use    Smoking status: Never    Smokeless tobacco: Never   Substance Use Topics    Alcohol use: Yes     Comment: 3xwk     Drug use: No     Family History   Problem Relation Age of Onset    Breast cancer Maternal Aunt     Colon cancer Neg Hx     Ovarian cancer Neg Hx      Past Medical History:   Diagnosis Date    Esophageal reflux     Migraine      No past surgical history on file.    Patient Active Problem List   Diagnosis    Malignant neoplasm of upper-outer quadrant of right breast in female, estrogen receptor positive       Current Outpatient Medications   Medication Instructions    ergocalciferol, vitamin D2, (VITAMIN D ORAL) No dose, route, or frequency recorded.    LACTOBACILLUS ACIDOPHILUS ORAL No dose, route, or frequency recorded.    norgestimate-ethinyl estradioL (ESTARYLLA) 0.25-35 mg-mcg per tablet 1 tablet, Oral, Daily    nystatin (MYCOSTATIN) cream APPLY TOPICALLY TO THE AFFECTED AREA TWICE DAILY    omega 3-dha-epa-fish oil 1,200 (144-216) mg Cap No dose, route, or frequency recorded.    ondansetron (ZOFRAN) 4 MG tablet No dose, route, or frequency recorded.    pantoprazole (PROTONIX) 40 MG tablet No dose, route, or frequency recorded.    semaglutide (OZEMPIC SUBQ) No dose, route, or frequency recorded.       Review of Systems:   Review of Systems   Constitutional:  Negative for chills and fever.   HENT:  Negative for congestion and sore throat.    Eyes:  Negative for pain.   Respiratory:  Negative for cough and shortness of breath.    Cardiovascular:  Negative for chest pain, palpitations and leg swelling.   Gastrointestinal:  Positive for diarrhea. Negative for abdominal pain, nausea and vomiting.   Genitourinary:  Negative for dysuria and frequency.   Musculoskeletal:  Negative for back pain.   Skin:  Negative for rash.   Neurological:  Negative for dizziness and headaches.   Psychiatric/Behavioral:  The patient is nervous/anxious.        PHYSICAL EXAM:  /77   Pulse 86   Resp 18   " Ht 5' 4" (1.626 m)   Wt 93.3 kg (205 lb 12.8 oz)   LMP  (LMP Unknown)   SpO2 100%   BMI 35.33 kg/m²     ECOG 0  General: well appearing, in no apparent distress  HEENT: Normocephalic, EOMI, anicteric sclerae, MMM  Neck: supple, without cervical or supraclavicular lymphadenopathy.  Heart: regular rate and rhythm, normal S1 and S2, no murmurs, gallops or rubs.  Lungs: Clear to auscultation bilaterally, no increased wob  Breast: Right breast mass at 3 o'clock position. No skin changes.    Abdomen: Soft, nontender, nondistended with normal bowel sounds. No hepatosplenomegaly.  Extremities: No LE edema or joint effusion  Skin: warm, well-perfused, no rash  Neurologic: Alert and oriented x 4, normal speech and gait   Psychiatric: Conversing appropriately with providers throughout today's encounter.        Pertinent Labs & Imaging:  Reviewed recent labs, imaging and pathology.     Assessment & Plan:    Patient presents for management of newly diagnosed breast cancer that was found on diagnostic mammogram after patient noticed a palpable right breast mass. Had a needle biopsy of right breast with IDC grade 3, 8mm in greatest dimension, %, NH 40%, HER2 3+, Ki-67 >90%. Had a MRI which revealed a 2.4 cm x 2.1 cm x 2.3 cm right breast mass at the 3 o'clock position. A smaller benign appearing right breast mass. Normal axillary lymph nodes. She met with breast surgery who recommended systemic therapy before any surgical interventions. After completing systemic therapy her options will be a lumpectomy with radiation or just a mastectomy. If she decides on the lumpectomy surgery wants a biopsy of the smaller breast mass to confirm it is benign, if she gets a mastectomy then the biopsy is not needed. Due to her being T2 (tumor >2cm on MRI) milan-adjuvant treatment followed by surgery is indicated. Getting milan-adjuvant treatment will also allow her to receive Kadcyla after surgery if there is any residual disease seen. " Given her age and minimal medical history will favor treatment with TCHP (docetaxel/carboplatin/trastuzumab/pertuzumab).     Today we discussed her recent diagnosis and the natural history of Her2+ breast cancer. Given that her tumor is as least T2N0, and that she is otherwise healthy, would recommend proceed with neoadjuvant treatment with TCHP. I reviewed the dosing, schedule and potential side effects of this regimen as below: Docetaxel at 75 mg/meter squared), Carboplatin (at an AUC of 6) given every 3 weeks with Trastuzumab and Pertuzumab for a total of 6 cycles. After completion of the first 6 cycles, a determination will be made of adjuvant targeted therapy based on final pathology at the time of surgery. Given she is % will likely be on anti-estrogen therapy (tamoxifen given pre-menopausal) for 5 years.     First Infusion Loading Dose of Pertuzumab is 840 mg, followed by 420 mg every 3 weeks for 5 cycles, along with chemotherapy. First loading dose of Trastuzumab will be 8mg/Kg over 90 minutes followed 3 weeks later by the second Infusion of Trastuzumab 6mg/kg over 60 minutes Q 3 weeks and if tolerated, all subsequent Infusions of Trastuzumab but may be over 30 minutes until completion of 18 total cycles.     Overall side effects of chemotherapy were discussed including alopecia, immunosuppression, neutropenia, anemia, thrombocytopenia, and neuropathy. Other side effects such as nail changes, dry eyes, mouth sores, and bone pain were discussed. The need for growth factor support was also discussed and I recommended daily claritin following growth factor injection to help with that. I have recommended Neulasta 24 to 48 hours status post chemotherapy. Pertuzumab can be associated with a mild to severe skin rash. Rare but serious side effects such as increased risk of cardiac toxicity were discussed with Herceptin and Perjeta. Monitoring of her cardiac function with MUGAs or an echocardiogram is strongly  recommended every 6-9 weeks by NCCN guidelines while on milan-adjuvant Perjeta and Herceptin.        1. Malignant neoplasm of upper-outer quadrant of right breast in female, estrogen receptor positive  -- Consult for IR port placement ordered  -- TTE ordered   -- Patient wants to start chemo on February 14th-16th (work obligations)   -- Consented for TCHP today, plan on 6-cycles then will undergo surgery   -- Patient will reach out to breast surgery to let them know if she wants a lumpectomy or mastectomy   -- If patients wants the lumpectomy will get biopsy of smaller breast mass before starting systemic treatment   -- BRCA testing in process   -- Follow-up in 4-weeks prior to C1D1     Reviewed patients referring notes, imaging and pathology. Discussed diagnosis, staging, and treatment in detail with patient. All questions were answered to her apparent satisfaction. Will see her back in 4 weeks or sooner should the need arise.     Route Chart for Scheduling    Med Onc Chart Routing      Follow up with physician 4 weeks. RTC for C1D1 (requests 2/14 2/15 or 2/16)   Follow up with GILBERTO    Infusion scheduling note New or changed treatment   infusions q3 weeks x6   Injection scheduling note will need day 2 injection   Labs CBC, CMP and B HCG   Scheduling:  Preferred lab:  Lab interval: every 3 weeks     Imaging ECHO   echo   Pharmacy appointment No pharmacy appointment needed      Other referrals no referral to Oncology Primary Care needed -  no Massage appointment needed    Additional referrals needed  chemo school, port placement                  Total time of this visit, including time spent face to face with patient and/or via video/audio, and also in preparing for today's visit for MDM and documentation. (Medical Decision Making, including consideration of possible diagnoses, management options, complex medical record review, review of diagnostic tests and information, consideration and discussion of significant  complications based on comorbidities, and discussion with providers involved with the care of the patient) 60 minutes. Greater than 50% was spent face to face with the patient counseling and coordinating care.    Tesfaye iNcole MD  Hematology/Oncology Fellow PGY-IV\      I personally interviewed and examined this patient today with Dr. Nicole and agree with the assessment and plan set forth in his note. Given her T2N0 Her2+ breast cancer favor proceeding with na TCHP as above. She will reach out to surgery once she finalizes her decision about mastectomy vs lumpectomy (if lumpectomy would require additional biopsy). Surgery will be followed by adjuvant therapy pending final pathology. Will plan to add ET at that time. She does not desire fertility preservation. Requests to start chemo the week of 2/14.15 or 16 due to work conflicts. RTC for C1D1.    Kassie Blakely MD

## 2024-01-18 NOTE — PLAN OF CARE
START ON PATHWAY REGIMEN - Breast    [id number]        Pertuzumab (Perjeta)       Trastuzumab-xxxx       Docetaxel (Taxotere)       Carboplatin       Pertuzumab (Perjeta)       Trastuzumab-xxxx       Docetaxel (Taxotere)       Carboplatin           Additional Orders: Assess LVEF prior to initiation of trastuzumab and   pertuzumab and as clinically indicated during and after treatment. See PI for   details. Refer to PI for instructions regarding delayed or missed doses of   pertuzumab and trastuzumab.    **Always confirm dose/schedule in your pharmacy ordering system**    Patient Characteristics:  Preoperative or Nonsurgical Candidate (Clinical Staging), Neoadjuvant Therapy   followed by Surgery, Invasive Disease, Chemotherapy, HER2 Positive, ER Positive  Therapeutic Status: Preoperative or Nonsurgical Candidate (Clinical Staging)  AJCC M Category: cM0  AJCC Grade: G3  Breast Surgical Plan: Neoadjuvant Therapy followed by Surgery  ER Status: Positive (+)  AJCC 8 Stage Grouping: IB  HER2 Status: Positive (+)  AJCC T Category: cT2  AJCC N Category: cN0  IN Status: Positive (+)  Intent of Therapy:  Curative Intent, Discussed with Patient

## 2024-01-19 ENCOUNTER — TELEPHONE (OUTPATIENT)
Dept: OBSTETRICS AND GYNECOLOGY | Facility: CLINIC | Age: 39
End: 2024-01-19
Payer: COMMERCIAL

## 2024-01-19 ENCOUNTER — PATIENT MESSAGE (OUTPATIENT)
Dept: HEMATOLOGY/ONCOLOGY | Facility: CLINIC | Age: 39
End: 2024-01-19
Payer: COMMERCIAL

## 2024-01-19 ENCOUNTER — PATIENT MESSAGE (OUTPATIENT)
Dept: SURGERY | Facility: CLINIC | Age: 39
End: 2024-01-19
Payer: COMMERCIAL

## 2024-01-19 DIAGNOSIS — Z30.430 ENCOUNTER FOR IUD INSERTION: ICD-10-CM

## 2024-01-19 DIAGNOSIS — Z30.014 ENCOUNTER FOR INITIAL PRESCRIPTION OF INTRAUTERINE CONTRACEPTIVE DEVICE (IUD): Primary | ICD-10-CM

## 2024-01-19 NOTE — TELEPHONE ENCOUNTER
----- Message from Cely Escobar, Patient Care Assistant sent at 1/12/2024  3:46 PM CST -----  Please schedule paraguard. If you have u/s guidance that would be helpful. If not that is fine

## 2024-01-21 DIAGNOSIS — Z17.0 MALIGNANT NEOPLASM OF UPPER-OUTER QUADRANT OF RIGHT BREAST IN FEMALE, ESTROGEN RECEPTOR POSITIVE: Primary | ICD-10-CM

## 2024-01-21 DIAGNOSIS — C50.411 MALIGNANT NEOPLASM OF UPPER-OUTER QUADRANT OF RIGHT BREAST IN FEMALE, ESTROGEN RECEPTOR POSITIVE: Primary | ICD-10-CM

## 2024-01-22 ENCOUNTER — PATIENT MESSAGE (OUTPATIENT)
Dept: SURGERY | Facility: CLINIC | Age: 39
End: 2024-01-22
Payer: COMMERCIAL

## 2024-01-22 ENCOUNTER — LAB VISIT (OUTPATIENT)
Dept: PRIMARY CARE CLINIC | Facility: CLINIC | Age: 39
End: 2024-01-22
Payer: COMMERCIAL

## 2024-01-22 DIAGNOSIS — Z17.0 MALIGNANT NEOPLASM OF UPPER-OUTER QUADRANT OF RIGHT BREAST IN FEMALE, ESTROGEN RECEPTOR POSITIVE: ICD-10-CM

## 2024-01-22 DIAGNOSIS — C50.411 MALIGNANT NEOPLASM OF UPPER-OUTER QUADRANT OF RIGHT BREAST IN FEMALE, ESTROGEN RECEPTOR POSITIVE: ICD-10-CM

## 2024-01-22 LAB
BASOPHILS # BLD AUTO: 0.02 K/UL (ref 0–0.2)
BASOPHILS NFR BLD: 0.3 % (ref 0–1.9)
DIFFERENTIAL METHOD BLD: ABNORMAL
EOSINOPHIL # BLD AUTO: 0.1 K/UL (ref 0–0.5)
EOSINOPHIL NFR BLD: 1.2 % (ref 0–8)
ERYTHROCYTE [DISTWIDTH] IN BLOOD BY AUTOMATED COUNT: 13.1 % (ref 11.5–14.5)
HCT VFR BLD AUTO: 42 % (ref 37–48.5)
HGB BLD-MCNC: 13.4 G/DL (ref 12–16)
IMM GRANULOCYTES # BLD AUTO: 0.01 K/UL (ref 0–0.04)
IMM GRANULOCYTES NFR BLD AUTO: 0.1 % (ref 0–0.5)
INR PPP: 1 (ref 0.8–1.2)
LYMPHOCYTES # BLD AUTO: 2.9 K/UL (ref 1–4.8)
LYMPHOCYTES NFR BLD: 38.8 % (ref 18–48)
MCH RBC QN AUTO: 28.6 PG (ref 27–31)
MCHC RBC AUTO-ENTMCNC: 31.9 G/DL (ref 32–36)
MCV RBC AUTO: 90 FL (ref 82–98)
MONOCYTES # BLD AUTO: 0.8 K/UL (ref 0.3–1)
MONOCYTES NFR BLD: 10.9 % (ref 4–15)
NEUTROPHILS # BLD AUTO: 3.7 K/UL (ref 1.8–7.7)
NEUTROPHILS NFR BLD: 48.7 % (ref 38–73)
NRBC BLD-RTO: 0 /100 WBC
PLATELET # BLD AUTO: 276 K/UL (ref 150–450)
PMV BLD AUTO: 10.3 FL (ref 9.2–12.9)
PROTHROMBIN TIME: 10.5 SEC (ref 9–12.5)
RBC # BLD AUTO: 4.69 M/UL (ref 4–5.4)
WBC # BLD AUTO: 7.58 K/UL (ref 3.9–12.7)

## 2024-01-22 PROCEDURE — 85025 COMPLETE CBC W/AUTO DIFF WBC: CPT

## 2024-01-22 PROCEDURE — 85610 PROTHROMBIN TIME: CPT

## 2024-01-22 NOTE — PROGRESS NOTES
[facility]/ [facility] [employer] [facility]      Chief Complaint:   Encounter Diagnosis   Name Primary?    Malignant neoplasm of upper-outer quadrant of right breast in female, estrogen receptor positive Yes        Cancer Staging   Malignant neoplasm of upper-outer quadrant of right breast in female, estrogen receptor positive  Staging form: Breast, AJCC 8th Edition  - Clinical stage from 2024: Stage IB (cT2, cN0, cM0, G3, ER+, VA+, HER2+) - Signed by Kassie Blakely MD on 2024      HPI:  Madhu Dela Cruz is a 38 y.o. female who presents today for evaluation of newly diagnosed breast cancer. Oncologic history below:     2023 - Screening mammogram with no evidence of malignancy   2024 - Diagnostic mammogram (palpable right breast mass) with  2.2 x 1.8 x 2.1 cm right breast mass   2024 - Needle biopsy of right breast with IDC grade 3, 8mm in greatest dimension, %, [place] 40%, HER2 3+, Ki-67 >90%.  24 - Breast MRI with right breast 2.4 cm x 2.1 cm x 2.3 cm mass at the 3 o'clock position. A smaller benign appearing right breast mass. Normal axillary lymph nodes.     Gyn History:   Menarche: Age 10   Menopause: Pre    Age at first pregnancy: 15  : 2nd child, not first  HRT: Was on hormonal birth control, stopped   Does not desire fertility preservation.   Family History: Maternal aunt, diagnosed later in life. No ovarian cancer.     Patient presents today with her . She is nervous/anxious but is otherwise feeling ok. Has a little bit of breast discomfort at night from the biopsy but the pain is not worsening.The mass has not changed since it was first discovered. Denies any skin changes or nipple discharge. 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 She met with breast surgery yesterday and they discussed her surgical options after she gets systemic therapy.     Social History      Tobacco Use    Smoking status: Never    Smokeless tobacco: Never   Substance Use Topics    Alcohol use: Yes     Comment: 3xwk     Drug use: No     Family History   Problem Relation Age of Onset    Breast cancer Maternal Aunt     Colon cancer Neg Hx     Ovarian cancer Neg Hx      Past Medical History:   Diagnosis Date    Esophageal reflux     Migraine      No past surgical history on file.    Patient Active Problem List   Diagnosis    Malignant neoplasm of upper-outer quadrant of right breast in female, estrogen receptor positive       Current Outpatient Medications   Medication Instructions    dexAMETHasone (DECADRON) 4 MG Tab Take 2 tablets by mouth daily on days 2-4 of each chemotherapy cycle.    ergocalciferol, vitamin D2, (VITAMIN D ORAL) No dose, route, or frequency recorded.    LACTOBACILLUS ACIDOPHILUS ORAL No dose, route, or frequency recorded.    OLANZapine (ZYPREXA) 5 MG tablet Take 1 tablet by mouth nightly on days 1-4 of each chemotherapy cycle.    omega 3-dha-epa-fish oil 1,200 (144-216) mg Cap No dose, route, or frequency recorded.    ondansetron (ZOFRAN) 4 MG tablet No dose, route, or frequency recorded.    pantoprazole (PROTONIX) 40 MG tablet No dose, route, or frequency recorded.    prochlorperazine (COMPAZINE) 10 mg, Oral, Every 6 hours PRN    semaglutide (OZEMPIC SUBQ) No dose, route, or frequency recorded.       Review of Systems:   Review of Systems   Constitutional:  Negative for chills and fever.   HENT:  Negative for congestion and sore throat.    Eyes:  Negative for pain.   Respiratory:  Negative for cough and shortness of breath.    Cardiovascular:  Negative for chest pain, palpitations and leg swelling.   Gastrointestinal:  Positive for diarrhea. Negative for abdominal pain, nausea and vomiting.   Genitourinary:  Negative for dysuria and frequency.   Musculoskeletal:  Negative for back pain.   Skin:  Negative for rash.   Neurological:  Negative for dizziness and headaches.    Psychiatric/Behavioral:  The patient is nervous/anxious.        PHYSICAL EXAM:  LMP  (LMP Unknown)     ECOG 0  General: well appearing, in no apparent distress  HEENT: Normocephalic, EOMI, anicteric sclerae, MMM  Neurologic: Alert and oriented x 4, normal speech and gait   Psychiatric: Conversing appropriately with providers throughout today's encounter.    Limited due to virtual visit    Pertinent Labs & Imaging:  Reviewed recent labs, imaging and pathology.     Assessment & Plan:    Patient presents for management of newly diagnosed breast cancer that was found on diagnostic mammogram after patient noticed a palpable right breast mass. Had a needle biopsy of right breast with IDC grade 3, 8mm in greatest dimension, %, [place] 40%, HER2 3+, Ki-67 >90%. Had a MRI which revealed a 2.4 cm x 2.1 cm x 2.3 cm right breast mass at the 3 o'clock position. A smaller benign appearing right breast mass. Normal axillary lymph nodes. She met with breast surgery who recommended systemic therapy before any surgical interventions. After completing systemic therapy her options will be a lumpectomy with radiation or just a mastectomy. If she decides on the lumpectomy surgery wants a biopsy of the smaller breast mass to confirm it is benign, if she gets a mastectomy then the biopsy is not needed. Due to her being T2 (tumor >2cm on MRI) milan-adjuvant treatment followed by surgery is indicated. Getting milan-adjuvant treatment will also allow her to receive Kadcyla after surgery if there is any residual disease seen. Given her age and minimal medical history will favor treatment with TCHP (docetaxel/carboplatin/trastuzumab/pertuzumab).     Today we discussed her recent diagnosis and the natural history of Her2+ breast cancer. Given that her tumor is as least T2N0, and that she is otherwise healthy, would recommend proceed with neoadjuvant treatment with TCHP. I reviewed the dosing, schedule and potential side effects of this regimen as  below: Docetaxel at 75 mg/meter squared), Carboplatin (at an AUC of 6) given every 3 weeks with Trastuzumab and Pertuzumab for a total of 6 cycles. After completion of the first 6 cycles, a determination will be made of adjuvant targeted therapy based on final pathology at the time of surgery. Given she is % will likely be on anti-estrogen therapy (tamoxifen given pre-menopausal) for 5 years.     First Infusion Loading Dose of Pertuzumab is 840 mg, followed by 420 mg every 3 weeks for 5 cycles, along with chemotherapy. First loading dose of Trastuzumab will be 8mg/Kg over 90 minutes followed 3 weeks later by the second Infusion of Trastuzumab 6mg/kg over 60 minutes Q 3 weeks and if tolerated, all subsequent Infusions of Trastuzumab but may be over 30 minutes until completion of 18 total cycles.     Overall side effects of chemotherapy were discussed including alopecia, immunosuppression, neutropenia, anemia, thrombocytopenia, and neuropathy. Other side effects such as nail changes, dry eyes, mouth sores, and bone pain were discussed. The need for growth factor support was also discussed and I recommended daily claritin following growth factor injection to help with that. I have recommended Neulasta 24 to 48 hours status post chemotherapy. Pertuzumab can be associated with a mild to severe skin rash. Rare but serious side effects such as increased risk of cardiac toxicity were discussed with Herceptin and Perjeta. Monitoring of her cardiac function with MUGAs or an echocardiogram is strongly recommended every 6-9 weeks by NCCN guidelines while on milan-adjuvant Perjeta and Herceptin.        1. Malignant neoplasm of upper-outer quadrant of right breast in female, estrogen receptor positive  -- Consult for IR port placement ordered  -- TTE ordered   -- Patient wants to start chemo on February 14th-16th (work obligations)   -- Consented for TCHP today, plan on 6-cycles then will undergo surgery   -- Patient will  reach out to breast surgery to let them know if she wants a lumpectomy or mastectomy   -- If patients wants the lumpectomy will get biopsy of smaller breast mass before starting systemic treatment   -- BRCA testing in process   -- Follow-up in 4-weeks prior to C1D1     Madhu Dela Cruz was consented for chemotherapy/immunotherapy to treat HER2+ breast cancer Madhu Dela Cruz was consented to receive taxotere, carboplatin, herceptin and perjeta.   The consent was discussed and reviewed with patient.     2. Patient was education on what to expect when receiving chemotherapy including: checking in, receiving an ID band, 1 guest allowed in the infusion suite during infusion, can alternate people as well, pole going with you once you are hooked up, warm blankets are available, you may bring lunch and snacks, minimal snacks are available, take medications as regularly unless told otherwise, warm blankets, will be available. Education about what to expect during their chemo cycle and how often their regimen is given.     3. An extensive discussion was had which included a thorough discussion of the risk and benefits of treatment and alternatives.  Risks, including but not limited to, possible hair loss, bone marrow damage (anemia, thrombocytopenia, immune suppression, neutropenia), damage to body organs (brain, heart, liver, kidney, lungs, nervous system, skin, and others), allergic reactions, sterility, nausea/vomiting, constipation/diarrhea, sores in the mouth, secondary cancers, local damage at possible injection sites, and rarely death were all discussed. Specific side effects pertaining to their chemotherapy/immunotherapy medications were discussed as well.The patient agrees with the plan, and all questions and their support system's questions have been answered to their satisfaction. Contraindications and potential side effects discussed as listed in micromedx.     4. Patient was given binder which  includes: contact information for the [facility], an immunotherapy side effect guide (if applicable to patient), resources including, but not limited to: women's wellness, acupuncture, physical therapy, , urgent care within oncology and financial assistance.     5. Patient was educated on when to call (and given the numbers to call and knows to message via [facility] if possible) or notify the provider including, but not limited to:   Persistent Nausea and/or Vomiting  Dehydration  Persistent Diarrhea  Fever of 100.4 > 1 hour in duration or any isolated fever > 101   Rash (while on active chemotherapy or immunotherapy)   Severe pain or new onset pain not controlled by current medication regimen  Or any other symptom you feel is related to your current hematology or oncology treatment    6. Patient was provided with additional resources that [facility] offers including, but not limited to: financial counseling, , psychologist, palliative care, support groups, transportation, dietician, rehab services, women's wellness and urgent care visits within the oncology department.     7. Patient was offered and signed up for chemo care companion. Educated on daily vital signs and daily questionnaire.     8. Patient has an established PCP, patient currently without a PCP, but stable, will refer to internal medicine, or patient without a PCP and referred to oncology PCP clinic.      9. Patient was offered a virtual visit or a phone call 1 week post their Cycle 1 Day 1 chemotherapy and agreed.       Patient will Proceed with cycle 1 day 1 of 2/15/24. Patient will be seen ~1 week for a post chemo visit with GILBERTO.        Return to clinic in 3 weeks with MD appointment and labs.     Patient is in agreement with the proposed treatment plan. All questions were answered to the patient's satisfaction. Patient knows to call clinic for any new or worsening symptoms and if anything is needed before the next  clinic visit.          Paulina Rivera, FNP-C  Hematology & Medical Oncology   [address] [place] [address]  ph. [phone]  Fax. [phone]    Collaborating physician, Dr. Blakely.    Approximately 47 minutes were spent face-to-face with the patient.  Approximately 60 minutes in total were spent on this encounter, which includes face-to-face time and non-face-to-face time preparing to see the patient (e.g., review of tests), obtaining and/or reviewing separately obtained history, documenting clinical information in the electronic or other health record, independently interpreting results (not separately reported) and communicating results to the patient/family/caregiver, or care coordination (not separately reported).     The patient location is: her home  The chief complaint leading to consultation is: breast cancer    Visit type: audiovisual    Face to Face time with patient: 47  60 minutes of total time spent on the encounter, which includes face to face time and non-face to face time preparing to see the patient (eg, review of tests), Obtaining and/or reviewing separately obtained history, Documenting clinical information in the electronic or other health record, Independently interpreting results (not separately reported) and communicating results to the patient/family/caregiver, or Care coordination (not separately reported).         Each patient to whom he or she provides medical services by telemedicine is:  (1) informed of the relationship between the physician and patient and the respective role of any other health care provider with respect to management of the patient; and (2) notified that he or she may decline to receive medical services by telemedicine and may withdraw from such care at any time.    Notes: see above      Route Chart for Scheduling    Med Onc Chart Routing      Follow up with physician    Follow up with GILBERTO 3 weeks. already scheduled with Cristina Villarreal  scheduling note    Injection scheduling note    Labs    Imaging    Pharmacy appointment    Other referrals                  Treatment Plan Information   OP BREAST TCHP (pertuzumab trastuzumab DOCEtaxel CARBOplatin) Q3W   Kassie Blakely MD   Upcoming Treatment Dates - OP BREAST TCHP (pertuzumab trastuzumab DOCEtaxel CARBOplatin) Q3W    2/14/2024       Pre-Medications       palonosetron 0.25mg/dexAMETHasone 12mg in NS IVPB 0.25 mg 50 mL       Chemotherapy       pertuzumab (PERJETA) 840 mg in sodium chloride 0.9% 278 mL infusion       trastuzumab-anns (KANJINTI) 747 mg in sodium chloride 0.9% 250 mL chemo infusion       DOCEtaxel (TAXOTERE) 75 mg/m2 = 154 mg in sodium chloride 0.9% 257.7 mL chemo infusion       CARBOplatin (PARAPLATIN) in sodium chloride 0.9% 250 mL chemo infusion       Supportive Care       prochlorperazine injection 10 mg       meperidine (PF) injection Soln 25 mg       Antiemetics       aprepitant (CINVANTI) injection 130 mg  2/15/2024       Growth Factor       pegfilgrastim-cbqv (UDENYCA) injection 6 mg  3/6/2024       Chemotherapy       pertuzumab (PERJETA) 420 mg in sodium chloride 0.9% 264 mL infusion       trastuzumab-anns (KANJINTI) 560 mg in sodium chloride 0.9% 250 mL chemo infusion       DOCEtaxel (TAXOTERE) 75 mg/m2 = 154 mg in sodium chloride 0.9% 257.7 mL chemo infusion       CARBOplatin (PARAPLATIN) in sodium chloride 0.9% 250 mL chemo infusion       Supportive Care       prochlorperazine injection 10 mg       meperidine injection 25 mg       Antiemetics       aprepitant (CINVANTI) injection 130 mg       palonosetron 0.25mg/dexAMETHasone 12mg in NS IVPB 0.25 mg 50 mL  3/7/2024       Growth Factor       pegfilgrastim-cbqv (UDENYCA) injection 6 mg

## 2024-01-24 ENCOUNTER — OFFICE VISIT (OUTPATIENT)
Dept: HEMATOLOGY/ONCOLOGY | Facility: CLINIC | Age: 39
End: 2024-01-24
Payer: COMMERCIAL

## 2024-01-24 DIAGNOSIS — C50.411 MALIGNANT NEOPLASM OF UPPER-OUTER QUADRANT OF RIGHT BREAST IN FEMALE, ESTROGEN RECEPTOR POSITIVE: Primary | ICD-10-CM

## 2024-01-24 DIAGNOSIS — Z17.0 MALIGNANT NEOPLASM OF UPPER-OUTER QUADRANT OF RIGHT BREAST IN FEMALE, ESTROGEN RECEPTOR POSITIVE: Primary | ICD-10-CM

## 2024-01-24 PROCEDURE — 1159F MED LIST DOCD IN RCRD: CPT | Mod: CPTII,95,, | Performed by: NURSE PRACTITIONER

## 2024-01-24 PROCEDURE — 1160F RVW MEDS BY RX/DR IN RCRD: CPT | Mod: CPTII,95,, | Performed by: NURSE PRACTITIONER

## 2024-01-24 PROCEDURE — 99215 OFFICE O/P EST HI 40 MIN: CPT | Mod: 95,,, | Performed by: NURSE PRACTITIONER

## 2024-01-24 RX ORDER — LIDOCAINE AND PRILOCAINE 25; 25 MG/G; MG/G
CREAM TOPICAL
Qty: 25 G | Refills: 1 | Status: SHIPPED | OUTPATIENT
Start: 2024-01-24

## 2024-01-25 ENCOUNTER — OFFICE VISIT (OUTPATIENT)
Dept: OBSTETRICS AND GYNECOLOGY | Facility: CLINIC | Age: 39
End: 2024-01-25
Attending: OBSTETRICS & GYNECOLOGY
Payer: COMMERCIAL

## 2024-01-25 ENCOUNTER — TELEPHONE (OUTPATIENT)
Dept: SURGERY | Facility: CLINIC | Age: 39
End: 2024-01-25
Payer: COMMERCIAL

## 2024-01-25 ENCOUNTER — PATIENT MESSAGE (OUTPATIENT)
Dept: INTERVENTIONAL RADIOLOGY/VASCULAR | Facility: HOSPITAL | Age: 39
End: 2024-01-25
Payer: COMMERCIAL

## 2024-01-25 ENCOUNTER — TELEPHONE (OUTPATIENT)
Dept: HEMATOLOGY/ONCOLOGY | Facility: CLINIC | Age: 39
End: 2024-01-25
Payer: COMMERCIAL

## 2024-01-25 VITALS
HEIGHT: 64 IN | SYSTOLIC BLOOD PRESSURE: 113 MMHG | DIASTOLIC BLOOD PRESSURE: 76 MMHG | BODY MASS INDEX: 35.7 KG/M2 | WEIGHT: 209.13 LBS

## 2024-01-25 DIAGNOSIS — Z30.014 ENCOUNTER FOR INITIAL PRESCRIPTION OF INTRAUTERINE CONTRACEPTIVE DEVICE (IUD): ICD-10-CM

## 2024-01-25 DIAGNOSIS — Z30.430 ENCOUNTER FOR IUD INSERTION: ICD-10-CM

## 2024-01-25 DIAGNOSIS — Z97.5 CONTRACEPTION, DEVICE INTRAUTERINE: Primary | ICD-10-CM

## 2024-01-25 LAB
B-HCG UR QL: NEGATIVE
CTP QC/QA: YES

## 2024-01-25 PROCEDURE — 99999 PR PBB SHADOW E&M-EST. PATIENT-LVL III: CPT | Mod: PBBFAC,,, | Performed by: OBSTETRICS & GYNECOLOGY

## 2024-01-25 PROCEDURE — 81025 URINE PREGNANCY TEST: CPT | Mod: S$GLB,,, | Performed by: OBSTETRICS & GYNECOLOGY

## 2024-01-25 PROCEDURE — 58300 INSERT INTRAUTERINE DEVICE: CPT | Mod: S$GLB,,, | Performed by: OBSTETRICS & GYNECOLOGY

## 2024-01-25 PROCEDURE — 99499 UNLISTED E&M SERVICE: CPT | Mod: S$GLB,,, | Performed by: OBSTETRICS & GYNECOLOGY

## 2024-01-25 NOTE — PROCEDURES
Insertion of IUD    Date/Time: 1/25/2024 9:45 AM    Performed by: Dorina Cullen MD  Authorized by: Dorina Cullen MD    Consent:     Consent obtained:  Prior to procedure the appropriate consent was completed and verified    Consent given by:  Patient    Procedure risks and benefits discussed: yes      Patient questions answered: yes      Patient agrees, verbalizes understanding, and wants to proceed: yes     Device to be inserted was verified by patient: yes (Paraguard)    Educational handouts given: no      Instructions and paperwork completed: no    Insertion Procedure:   380 mm copper intrauterine device 380 square mm       Pelvic exam performed: yes      Negative GC/chlamydia test: no      Negative urine pregnancy test: yes      Negative serum pregnancy test: no      Cervix cleaned and prepped: yes      Speculum placed in vagina: yes      Tenaculum applied to cervix: no      Uterus sounded: yes      Uterus sound depth (cm):  7    IUD inserted with no complications: yes      IUD type:  ParaGard    Strings trimmed: yes    Post-procedure:     Patient tolerated procedure well: yes      Patient will follow up after next period: yes    Comments:      Placed under u/s guidance. Tolerated well.

## 2024-01-25 NOTE — TELEPHONE ENCOUNTER
Spoke to pt. in regards to referral to [facility] placed by Dr. Mahi Barajas. Pt. approved scheduling for virtual appt with Joanna Dubinsky, PA-C on Tuesday 1/30/24 @ 1PM. MA advised pt. That this is an introductory visit whereby the provider will review pt's overall health and all services such as acupx, can psych, nutrition, & numerous of other therapies offered by [facility]. Pt acknowledged.      [place], [place] ext [phone]

## 2024-01-25 NOTE — TELEPHONE ENCOUNTER
Genetic Lay Navigator Note:    Called patient with the results of genetic testing. Explained that genetic testing resulted with a variant of unknown significance. Also, discussed that patient is at an elevated risk for Colorectal Cancer due to family history. Discussed that the results do not indicate any further action is needed at this time, and that the company will notify us if any additional recommendations become available. Explained that we will provide a formal copy of report via [employer] or mail to patient.    Offered patient a phone session with a genetic counselor through [employer], or a in person session with our [facility] genetic counselors. Also discussed that this will remain an available option for patient at any time in the future.     Patient was instructed to call with any additional questions or concerns. Verbalized understanding of all information.    Provider notified of results and that patient was given them.

## 2024-01-25 NOTE — TELEPHONE ENCOUNTER
Contacted patient regarding genetic testing results. Patient did not answer, message left with my name and direct number for patient to contact back.

## 2024-01-29 ENCOUNTER — TELEPHONE (OUTPATIENT)
Dept: INTERVENTIONAL RADIOLOGY/VASCULAR | Facility: HOSPITAL | Age: 39
End: 2024-01-29
Payer: COMMERCIAL

## 2024-01-29 NOTE — NURSING
Pre-procedure call complete.  Arrival time 0600.  Patient instructed not to eat or drink anything after midnight the night before procedure.  Patient aware will need someone to provide transport home and monitor pt 8 hours post procedure.  No driving for at least 24 hours after procedure.   Patient advised to take blood pressure, heart medications, with a sip of water morning of procedure.  Patient verbalized aware of which medications to take.  Do not take  sleep medication (including OTC) and anxiety medication the night before procedure.  Arrival time and location given.  Expected length of stay reviewed.  Covid screening completed.  Patient verbalized understanding of all pre-procedure instructions.  Written instructions and directions sent to patient in [employer]/portal.

## 2024-01-31 ENCOUNTER — HOSPITAL ENCOUNTER (OUTPATIENT)
Dept: INTERVENTIONAL RADIOLOGY/VASCULAR | Facility: HOSPITAL | Age: 39
Discharge: HOME | End: 2024-01-31
Attending: FAMILY MEDICINE
Payer: COMMERCIAL

## 2024-01-31 VITALS
WEIGHT: 209 LBS | BODY MASS INDEX: 35.68 KG/M2 | OXYGEN SATURATION: 97 % | SYSTOLIC BLOOD PRESSURE: 123 MMHG | TEMPERATURE: 98 F | RESPIRATION RATE: 18 BRPM | HEART RATE: 76 BPM | HEIGHT: 64 IN | DIASTOLIC BLOOD PRESSURE: 79 MMHG

## 2024-01-31 DIAGNOSIS — C50.411 MALIGNANT NEOPLASM OF UPPER-OUTER QUADRANT OF RIGHT BREAST IN FEMALE, ESTROGEN RECEPTOR POSITIVE: ICD-10-CM

## 2024-01-31 DIAGNOSIS — Z17.0 MALIGNANT NEOPLASM OF UPPER-OUTER QUADRANT OF RIGHT BREAST IN FEMALE, ESTROGEN RECEPTOR POSITIVE: ICD-10-CM

## 2024-01-31 LAB
B-HCG UR QL: NEGATIVE
CTP QC/QA: YES
INTEGRATED BRAC ANALYSIS: NORMAL

## 2024-01-31 PROCEDURE — 99152 MOD SED SAME PHYS/QHP 5/>YRS: CPT

## 2024-01-31 PROCEDURE — 99900035 HC TECH TIME PER 15 MIN (STAT)

## 2024-01-31 PROCEDURE — 99153 MOD SED SAME PHYS/QHP EA: CPT

## 2024-01-31 PROCEDURE — 76937 US GUIDE VASCULAR ACCESS: CPT | Mod: 26,,, | Performed by: RADIOLOGY

## 2024-01-31 PROCEDURE — 25000003 PHARM REV CODE 250: Performed by: RADIOLOGY

## 2024-01-31 PROCEDURE — 77001 FLUOROGUIDE FOR VEIN DEVICE: CPT | Mod: TC

## 2024-01-31 PROCEDURE — 36561 INSERT TUNNELED CV CATH: CPT | Mod: LT

## 2024-01-31 PROCEDURE — C1788 PORT, INDWELLING, IMP: HCPCS

## 2024-01-31 PROCEDURE — 36561 INSERT TUNNELED CV CATH: CPT | Mod: LT,,, | Performed by: RADIOLOGY

## 2024-01-31 PROCEDURE — 94761 N-INVAS EAR/PLS OXIMETRY MLT: CPT | Mod: 59

## 2024-01-31 PROCEDURE — 25000003 PHARM REV CODE 250

## 2024-01-31 PROCEDURE — 63600175 PHARM REV CODE 636 W HCPCS: Performed by: RADIOLOGY

## 2024-01-31 PROCEDURE — C1894 INTRO/SHEATH, NON-LASER: HCPCS

## 2024-01-31 PROCEDURE — 99152 MOD SED SAME PHYS/QHP 5/>YRS: CPT | Mod: ,,, | Performed by: RADIOLOGY

## 2024-01-31 PROCEDURE — 81025 URINE PREGNANCY TEST: CPT | Performed by: FAMILY MEDICINE

## 2024-01-31 RX ORDER — MIDAZOLAM HYDROCHLORIDE 1 MG/ML
INJECTION INTRAMUSCULAR; INTRAVENOUS
Status: COMPLETED | OUTPATIENT
Start: 2024-01-31 | End: 2024-01-31

## 2024-01-31 RX ORDER — SODIUM CHLORIDE 9 MG/ML
INJECTION, SOLUTION INTRAVENOUS CONTINUOUS
Status: DISCONTINUED | OUTPATIENT
Start: 2024-01-31 | End: 2024-02-01 | Stop reason: HOSPADM

## 2024-01-31 RX ORDER — FENTANYL CITRATE 50 UG/ML
INJECTION, SOLUTION INTRAMUSCULAR; INTRAVENOUS
Status: COMPLETED | OUTPATIENT
Start: 2024-01-31 | End: 2024-01-31

## 2024-01-31 RX ORDER — LIDOCAINE HYDROCHLORIDE 20 MG/ML
INJECTION, SOLUTION INFILTRATION; PERINEURAL
Status: COMPLETED | OUTPATIENT
Start: 2024-01-31 | End: 2024-01-31

## 2024-01-31 RX ORDER — HEPARIN SODIUM 1000 [USP'U]/ML
INJECTION, SOLUTION INTRAVENOUS; SUBCUTANEOUS
Status: COMPLETED | OUTPATIENT
Start: 2024-01-31 | End: 2024-01-31

## 2024-01-31 RX ORDER — LIDOCAINE HYDROCHLORIDE 10 MG/ML
1 INJECTION, SOLUTION EPIDURAL; INFILTRATION; INTRACAUDAL; PERINEURAL ONCE
Status: DISCONTINUED | OUTPATIENT
Start: 2024-01-31 | End: 2024-02-01 | Stop reason: HOSPADM

## 2024-01-31 RX ADMIN — HEPARIN SODIUM 500 UNITS: 1000 INJECTION, SOLUTION INTRAVENOUS; SUBCUTANEOUS at 08:01

## 2024-01-31 RX ADMIN — MIDAZOLAM HYDROCHLORIDE 1 MG: 1 INJECTION INTRAMUSCULAR; INTRAVENOUS at 08:01

## 2024-01-31 RX ADMIN — LIDOCAINE HYDROCHLORIDE 20 ML: 20 INJECTION, SOLUTION INFILTRATION; PERINEURAL at 08:01

## 2024-01-31 RX ADMIN — FENTANYL CITRATE 50 MCG: 50 INJECTION, SOLUTION INTRAMUSCULAR; INTRAVENOUS at 08:01

## 2024-01-31 RX ADMIN — SODIUM CHLORIDE 250 ML: 0.9 INJECTION, SOLUTION INTRAVENOUS at 08:01

## 2024-01-31 NOTE — PLAN OF CARE
Chart reviewed. Preop nursing care completed per orders. Safe surgery checklist complete. Plan of care reviewed. Patient verbalizes understanding. Questions and concerns addressed.

## 2024-01-31 NOTE — PLAN OF CARE
Home Instructions for Port Care      Bathing:     Keep port site dry for 1 week     Today you may only sponge bathe     You may shower with the dressing covered tomorrow. Do not submerge.     Dressing:     Remove the dressing after 48 hours and leave open to air     Cover the site with a bandage if it might get dirty (for example if doing yard work)     Remove bandage as instructed. If there are skin tapes over the incision, leave them in place and wash over them. You should wash the site with soap and water, but do not soak site in the tub until completely healed. The skin tapes should fall off in 7-10 days. If they have not come off by themselves at that time, they can easily be removed in your shower or bath.     Activity level:     If you have received sedation or an anesthetic, you may feel sleepy for several hours. Rest until you are awake. Do not drive for 24 hours. Gradually resume normal activities.      Special Restrictions:     If you experience discomfort, take the medication you normally use for pain. If pain persists, please call us.     When to call the doctor     Obvious bleeding (some dried blood over the incision is normal)     Redness or swelling in the affected area     Fever equal or greater than 101F     Drainage (pus) from the wound     Persistent pain not relieved by pain medications     **After hours and weekends Call [phone] and ask for Radiology Resident on call**     For immediate concerns that are not emergent, you may call our radiology clinic at [phone].

## 2024-01-31 NOTE — Clinical Note
Left: Chest.   Scrubbed with ChloroPrep With Tint.   Painted with None.  Hair: N/A.  Skin prep dry before draping.  Prepped by: Hattie Pizano,  1/31/2024 8:15 AM.

## 2024-01-31 NOTE — DISCHARGE INSTRUCTIONS
Port Placement    *PLEASE READ CAREFULLY!!*              Your port insertion/removal site is dressed with gauze and Tega-Derm (looks like Saran Wrap). Underneath the dressing is Derma-Bond (skin glue) and Steri-Strips (looks like strips of white tape). There are also sutures, or stitches, on the inside - these will dissolve on their own.         Do NOT remove the dressing today. Do NOT shower/bathe tonight.          You can shower/bathe tomorrow night. Leave the dressing ON during your shower/bath. Turn the site away from the shower's spray; if you bathe, do NOT allow the site underwater. After your shower/bath, you may remove the dressing. Gently pat the site dry.         The site may remained uncovered. Allow the Steri-Strips and Derma-Bond to come off on their own. You can use a large pad-style bandage, or a 4x4 gauze dressing and tape to cover the site if your clothing (or bra straps) rubbing against it is uncomfortable to you. These items can be found at your local pharmacy or grocery/shopping center.         Until the site heals, usually within one to two weeks, DO NOT submerge in a bath tub, swimming pool, or jacuzzi. DO NOT lift/carry anything heavier than a gallon of milk. DO NOT reach excessively or repeatedly above your head.         Hand hygiene is VERY important! Anyone, including you, who touches the port site should do so with CLEAN hands.                    For INSERTIONS: Only trained personnel should access your port. At the end of whatever activity involving your port is completed (chemo, other infusion, blood draw, etc), it should be flushed with a medicine called Heparin.

## 2024-01-31 NOTE — DISCHARGE SUMMARY
Radiology Discharge Summary      Hospital Course: No complications    Admit Date: 1/31/2024  Discharge Date: 01/31/2024     Instructions Given to Patient: Yes  Diet: Resume prior diet  Activity: activity as tolerated    Description of Condition on Discharge: Stable  Vital Signs (Most Recent): Temp: 98.4 °F (36.9 °C) (01/31/24 0634)  Pulse: 84 (01/31/24 0836)  Resp: 16 (01/31/24 0836)  BP: (!) 128/94 (01/31/24 0836)  SpO2: 96 % (01/31/24 0836)    Discharge Disposition: Home    Discharge Diagnosis: Breast cancer s/p port placement     Follow-up: TANO Salazar MD  Diagnostic and Interventional Radiologist  Department of Radiology  Pager: [phone]

## 2024-01-31 NOTE — PROCEDURES
Radiology Post-Procedure Note    Pre Op Diagnosis: Breast cancer  Post Op Diagnosis: Same    Procedure: Port placement    Procedure performed by: Cade Salazar MD    Written Informed Consent Obtained: Yes  Specimen Removed: NO  Estimated Blood Loss: Minimal    Findings:   Left chest wall port placed via IJ access without complication.    Patient tolerated procedure well.    Cade Salazar MD  Diagnostic and Interventional Radiologist  Department of Radiology  Pager: [phone]

## 2024-01-31 NOTE — H&P
Radiology History & Physical      SUBJECTIVE:     Chief Complaint: Breast cancer    History of Present Illness:  Madhu Dela Cruz is a 38 y.o. female with breast cancer who presents for port placement.    Past Medical History:   Diagnosis Date    Esophageal reflux     Migraine      No past surgical history on file.    Home Meds:   Prior to Admission medications    Medication Sig Start Date End Date Taking? Authorizing Provider   ergocalciferol, vitamin D2, (VITAMIN D ORAL)    Yes Provider, Historical   LACTOBACILLUS ACIDOPHILUS ORAL    Yes Provider, Historical   omega 3-dha-epa-fish oil 1,200 (144-216) mg Cap    Yes Provider, Historical   ondansetron (ZOFRAN) 4 MG tablet  12/13/17  Yes Provider, Historical   pantoprazole (PROTONIX) 40 MG tablet  3/21/23  Yes Provider, Historical   dexAMETHasone (DECADRON) 4 MG Tab Take 2 tablets by mouth daily on days 2-4 of each chemotherapy cycle. 1/18/24   Kassie Blakely MD   LIDOcaine-prilocaine (EMLA) cream Apply topically as needed (apply 30-60 minutes prior to chemotherapy). 1/24/24   Paulina Rivera NP   OLANZapine (ZYPREXA) 5 MG tablet Take 1 tablet by mouth nightly on days 1-4 of each chemotherapy cycle. 1/18/24   Kassie Blakely MD   prochlorperazine (COMPAZINE) 5 MG tablet Take 2 tablets (10 mg total) by mouth every 6 (six) hours as needed for Nausea. 1/18/24   Kassie Blakely MD   semaglutide (OZEMPIC SUBQ)     Provider, Historical     Anticoagulants/Antiplatelets: no anticoagulation    Allergies: Review of patient's allergies indicates:  No Known Allergies  Sedation History:  no adverse reactions    Review of Systems:   Hematological: no known coagulopathies  Respiratory: no shortness of breath  Cardiovascular: no chest pain  Gastrointestinal: no abdominal pain  Genito-Urinary: no dysuria  Musculoskeletal: negative  Neurological: no TIA or stroke symptoms         OBJECTIVE:     Vital Signs (Most Recent)  Temp: 98.4 °F (36.9 °C) (01/31/24  "0634)  Pulse: 76 (01/31/24 0634)  Resp: 16 (01/31/24 0634)  BP: (!) 141/82 (01/31/24 0634)  SpO2: 96 % (01/31/24 0658)    Physical Exam:  ASA: 3  Mallampati: 2    General: no acute distress  Mental Status: alert and oriented to person, place and time  HEENT: normocephalic, atraumatic  Chest: unlabored breathing  Heart: regular heart rate  Abdomen: nondistended  Extremity: moves all extremities    Laboratory  Lab Results   Component Value Date    INR 1.0 01/22/2024       Lab Results   Component Value Date    WBC 7.58 01/22/2024    HGB 13.4 01/22/2024    HCT 42.0 01/22/2024    MCV 90 01/22/2024     01/22/2024    No results found for: "GLU", "NA", "K", "CL", "CO2", "BUN", "CREATININE", "CALCIUM", "MG", "ALT", "AST", "ALBUMIN", "BILITOT", "BILIDIR"    ASSESSMENT/PLAN:     Sedation Plan: Moderate.  Patient will undergo port placement.    Cade Salazar MD  Diagnostic and Interventional Radiologist  Department of Radiology  Pager: [phone]   "

## 2024-02-05 ENCOUNTER — PATIENT MESSAGE (OUTPATIENT)
Dept: HEMATOLOGY/ONCOLOGY | Facility: CLINIC | Age: 39
End: 2024-02-05
Payer: COMMERCIAL

## 2024-02-05 ENCOUNTER — TELEPHONE (OUTPATIENT)
Dept: HEMATOLOGY/ONCOLOGY | Facility: CLINIC | Age: 39
End: 2024-02-05
Payer: COMMERCIAL

## 2024-02-05 NOTE — TELEPHONE ENCOUNTER
"----- Message from Ingrid Noe sent at 2/5/2024  3:24 PM CST -----  Regarding: Pt advice  Contact: Pt    Pt requesting call back in regards to [place] paperwork. Pt stated she received a call back from [employer]. Pt stated paperwork was sent via portal, and nurse Cely responded. Pt stated she would need paperwork completed by Friday for her job.   Please call and adv @          Confirmed contact below:   Contact Name: Madhu Dela Cruz  Phone Number: [phone]               Additional Notes:  "Thank you for all that you do for our patients"                                           "

## 2024-02-05 NOTE — TELEPHONE ENCOUNTER
----- Message from Marquis Oro sent at 2/5/2024  2:07 PM CST -----  Type:  Patient Returning Call    Who Called:pt   Who Left Message for Patient:  Does the patient know what this is regarding?:paperwork forms   Would the patient rather a call back or a response via [facility]? annie  Best Call Back Number: [phone]  Additional Information: pt is calling in regards tot he FMLA papers that was sent over to the office through the [facility] portal, and has not yet received them back yet. The pts job is giving the pt until Friday to have the paperwork in. Please give the pt a call back as soon as possible

## 2024-02-05 NOTE — TELEPHONE ENCOUNTER
Left voicemail.     Did not receive FMLA forms through pt portal message through Dr Blakely. Requested pt to give our clinic a call back.

## 2024-02-05 NOTE — TELEPHONE ENCOUNTER
LVM in regards to r/s cancelled appt from 1/30/24 w/ Joanna Dubinsky, PA-C. MA instructed pt. in voicemail to call the office number for scheduling.       REEMA JOHNSON ext [phone]

## 2024-02-12 ENCOUNTER — PATIENT MESSAGE (OUTPATIENT)
Dept: HEMATOLOGY/ONCOLOGY | Facility: CLINIC | Age: 39
End: 2024-02-12
Payer: COMMERCIAL

## 2024-02-14 ENCOUNTER — LAB VISIT (OUTPATIENT)
Dept: LAB | Facility: HOSPITAL | Age: 39
End: 2024-02-14
Payer: COMMERCIAL

## 2024-02-14 DIAGNOSIS — Z17.0 MALIGNANT NEOPLASM OF UPPER-OUTER QUADRANT OF RIGHT BREAST IN FEMALE, ESTROGEN RECEPTOR POSITIVE: ICD-10-CM

## 2024-02-14 DIAGNOSIS — C50.411 MALIGNANT NEOPLASM OF UPPER-OUTER QUADRANT OF RIGHT BREAST IN FEMALE, ESTROGEN RECEPTOR POSITIVE: ICD-10-CM

## 2024-02-14 LAB
ALBUMIN SERPL BCP-MCNC: 3.5 G/DL (ref 3.5–5.2)
ALP SERPL-CCNC: 83 U/L (ref 55–135)
ALT SERPL W/O P-5'-P-CCNC: 16 U/L (ref 10–44)
ANION GAP SERPL CALC-SCNC: 6 MMOL/L (ref 8–16)
AST SERPL-CCNC: 16 U/L (ref 10–40)
BASOPHILS # BLD AUTO: 0.03 K/UL (ref 0–0.2)
BASOPHILS NFR BLD: 0.4 % (ref 0–1.9)
BILIRUB SERPL-MCNC: 0.3 MG/DL (ref 0.1–1)
BUN SERPL-MCNC: 11 MG/DL (ref 6–20)
CALCIUM SERPL-MCNC: 8.9 MG/DL (ref 8.7–10.5)
CHLORIDE SERPL-SCNC: 106 MMOL/L (ref 95–110)
CO2 SERPL-SCNC: 24 MMOL/L (ref 23–29)
CREAT SERPL-MCNC: 0.7 MG/DL (ref 0.5–1.4)
DIFFERENTIAL METHOD BLD: NORMAL
EOSINOPHIL # BLD AUTO: 0.1 K/UL (ref 0–0.5)
EOSINOPHIL NFR BLD: 1 % (ref 0–8)
ERYTHROCYTE [DISTWIDTH] IN BLOOD BY AUTOMATED COUNT: 13.1 % (ref 11.5–14.5)
EST. GFR  (NO RACE VARIABLE): >60 ML/MIN/1.73 M^2
GLUCOSE SERPL-MCNC: 100 MG/DL (ref 70–110)
HCG INTACT+B SERPL-ACNC: <2.4 MIU/ML
HCT VFR BLD AUTO: 40.1 % (ref 37–48.5)
HGB BLD-MCNC: 13.3 G/DL (ref 12–16)
IMM GRANULOCYTES # BLD AUTO: 0.02 K/UL (ref 0–0.04)
IMM GRANULOCYTES NFR BLD AUTO: 0.2 % (ref 0–0.5)
LYMPHOCYTES # BLD AUTO: 2.3 K/UL (ref 1–4.8)
LYMPHOCYTES NFR BLD: 27.7 % (ref 18–48)
MCH RBC QN AUTO: 29.1 PG (ref 27–31)
MCHC RBC AUTO-ENTMCNC: 33.2 G/DL (ref 32–36)
MCV RBC AUTO: 88 FL (ref 82–98)
MONOCYTES # BLD AUTO: 0.9 K/UL (ref 0.3–1)
MONOCYTES NFR BLD: 10.5 % (ref 4–15)
NEUTROPHILS # BLD AUTO: 5 K/UL (ref 1.8–7.7)
NEUTROPHILS NFR BLD: 60.2 % (ref 38–73)
NRBC BLD-RTO: 0 /100 WBC
PLATELET # BLD AUTO: 279 K/UL (ref 150–450)
PMV BLD AUTO: 10.8 FL (ref 9.2–12.9)
POTASSIUM SERPL-SCNC: 4.1 MMOL/L (ref 3.5–5.1)
PROT SERPL-MCNC: 6.8 G/DL (ref 6–8.4)
RBC # BLD AUTO: 4.57 M/UL (ref 4–5.4)
SODIUM SERPL-SCNC: 136 MMOL/L (ref 136–145)
WBC # BLD AUTO: 8.3 K/UL (ref 3.9–12.7)

## 2024-02-14 PROCEDURE — 85025 COMPLETE CBC W/AUTO DIFF WBC: CPT | Performed by: STUDENT IN AN ORGANIZED HEALTH CARE EDUCATION/TRAINING PROGRAM

## 2024-02-14 PROCEDURE — 84702 CHORIONIC GONADOTROPIN TEST: CPT | Performed by: STUDENT IN AN ORGANIZED HEALTH CARE EDUCATION/TRAINING PROGRAM

## 2024-02-14 PROCEDURE — 36415 COLL VENOUS BLD VENIPUNCTURE: CPT | Mod: PN | Performed by: STUDENT IN AN ORGANIZED HEALTH CARE EDUCATION/TRAINING PROGRAM

## 2024-02-14 PROCEDURE — 80053 COMPREHEN METABOLIC PANEL: CPT | Performed by: STUDENT IN AN ORGANIZED HEALTH CARE EDUCATION/TRAINING PROGRAM

## 2024-02-14 NOTE — PROGRESS NOTES
[facility]/ [facility]      Chief Complaint:   Encounter Diagnosis   Name Primary?    Malignant neoplasm of upper-outer quadrant of right breast in female, estrogen receptor positive Yes        Cancer Staging   Malignant neoplasm of upper-outer quadrant of right breast in female, estrogen receptor positive  Staging form: Breast, AJCC 8th Edition  - Clinical stage from 2024: Stage IB (cT2, cN0, cM0, G3, ER+, MD+, HER2+) - Signed by Kassie Blakely MD on 2024      HPI:  Madhu Dela Cruz is a 38 y.o. female who presents today for evaluation of newly diagnosed breast cancer. Oncologic history below:     Per Dr. Blakely's note:   2023 - Screening mammogram with no evidence of malignancy   2024 - Diagnostic mammogram (palpable right breast mass) with  2.2 x 1.8 x 2.1 cm right breast mass   2024 - Needle biopsy of right breast with IDC grade 3, 8mm in greatest dimension, %, MD 40%, HER2 3+, Ki-67 >90%.  24 - Breast MRI with right breast 2.4 cm x 2.1 cm x 2.3 cm mass at the 3 o'clock position. A smaller benign appearing right breast mass. Normal axillary lymph nodes.     Gyn History:   Menarche: Age 10   Menopause: Pre    Age at first pregnancy: 15  : 2nd child, not first  HRT: Was on hormonal birth control, stopped   Does not desire fertility preservation.   Family History: Maternal aunt, diagnosed later in life. No ovarian cancer.     She lives in [facility] with her , they run a photo lubin business.      Interval hx:   Patient presents today with her  for C1D1 of TCHP. She is nervous/anxious but is otherwise feeling ok. Has a little bit of breast discomfort at night from the biopsy but the pain is not worsening.The mass has not changed since it was first discovered. Denies any skin changes or nipple discharge. No new complaints today.  She was exposed to covid one week ago,  but has taken two home covid tests within the last 48 hours and both were negative.  No infectious s/s today.     Social History     Tobacco Use    Smoking status: Never    Smokeless tobacco: Never   Substance Use Topics    Alcohol use: Yes     Comment: 3xwk     Drug use: No     Family History   Problem Relation Age of Onset    Breast cancer Maternal Aunt     Colon cancer Neg Hx     Ovarian cancer Neg Hx      Past Medical History:   Diagnosis Date    Esophageal reflux     Migraine      No past surgical history on file.    Patient Active Problem List   Diagnosis    Malignant neoplasm of upper-outer quadrant of right breast in female, estrogen receptor positive       Current Outpatient Medications   Medication Instructions    dexAMETHasone (DECADRON) 4 MG Tab Take 2 tablets by mouth daily on days 2-4 of each chemotherapy cycle.    ergocalciferol, vitamin D2, (VITAMIN D ORAL) No dose, route, or frequency recorded.    LACTOBACILLUS ACIDOPHILUS ORAL No dose, route, or frequency recorded.    LIDOcaine-prilocaine (EMLA) cream Topical (Top), As needed (PRN)    OLANZapine (ZYPREXA) 5 MG tablet Take 1 tablet by mouth nightly on days 1-4 of each chemotherapy cycle.    omega 3-dha-epa-fish oil 1,200 (144-216) mg Cap No dose, route, or frequency recorded.    ondansetron (ZOFRAN) 4 MG tablet No dose, route, or frequency recorded.    pantoprazole (PROTONIX) 40 MG tablet No dose, route, or frequency recorded.    prochlorperazine (COMPAZINE) 10 mg, Oral, Every 6 hours PRN    semaglutide (OZEMPIC SUBQ) No dose, route, or frequency recorded.       Review of Systems:   Review of Systems   Constitutional:  Negative for chills and fever.   HENT:  Negative for congestion and sore throat.    Eyes:  Negative for pain.   Respiratory:  Negative for cough and shortness of breath.    Cardiovascular:  Negative for chest pain, palpitations and leg swelling.   Gastrointestinal:  Positive for diarrhea. Negative for abdominal pain, nausea and  "vomiting.   Genitourinary:  Negative for dysuria and frequency.   Musculoskeletal:  Negative for back pain.   Skin:  Negative for rash.   Neurological:  Negative for dizziness and headaches.   Psychiatric/Behavioral:  The patient is nervous/anxious.        PHYSICAL EXAM:  BP (!) 146/94   Pulse (!) 111   Temp 99.5 °F (37.5 °C)   Resp 17   Ht 5' 4" (1.626 m)   Wt 96.5 kg (212 lb 11.9 oz)   LMP 01/26/2024   SpO2 97%   BMI 36.52 kg/m²     ECOG 0  General: well appearing, in no apparent distress  HEENT: Normocephalic, EOMI, anicteric sclerae, MMM  Neck: supple, without cervical or supraclavicular lymphadenopathy.  Heart: regular rate and rhythm, normal S1 and S2, no murmurs, gallops or rubs.  Lungs: Clear to auscultation bilaterally, no increased wob  Breast: Right breast mass at 3 o'clock position. No skin changes.    Abdomen: Soft, nontender, nondistended with normal bowel sounds. No hepatosplenomegaly.  Extremities: No LE edema or joint effusion  Skin: warm, well-perfused, no rash  Neurologic: Alert and oriented x 4, normal speech and gait   Psychiatric: Conversing appropriately with providers throughout today's encounter.      Pertinent Labs & Imaging:  Reviewed recent labs, imaging and pathology.     Assessment & Plan:    Patient presents for management of newly diagnosed breast cancer that was found on diagnostic mammogram after patient noticed a palpable right breast mass. Had a needle biopsy of right breast with IDC grade 3, 8mm in greatest dimension, %, DC 40%, HER2 3+, Ki-67 >90%. Had a MRI which revealed a 2.4 cm x 2.1 cm x 2.3 cm right breast mass at the 3 o'clock position. A smaller benign appearing right breast mass. Normal axillary lymph nodes. She met with breast surgery who recommended systemic therapy before any surgical interventions. After completing systemic therapy her options will be a lumpectomy with radiation or just a mastectomy. If she decides on the lumpectomy surgery wants a " biopsy of the smaller breast mass to confirm it is benign, if she gets a mastectomy then the biopsy is not needed. Due to her being T2 (tumor >2cm on MRI) milan-adjuvant treatment followed by surgery is indicated. Getting milan-adjuvant treatment will also allow her to receive Kadcyla after surgery if there is any residual disease seen. Given her age and minimal medical history will favor treatment with TCHP (docetaxel/carboplatin/trastuzumab/pertuzumab).     Today we discussed her recent diagnosis and the natural history of Her2+ breast cancer. Given that her tumor is as least T2N0, and that she is otherwise healthy, would recommend proceed with neoadjuvant treatment with TCHP. I reviewed the dosing, schedule and potential side effects of this regimen as below: Docetaxel at 75 mg/meter squared), Carboplatin (at an AUC of 6) given every 3 weeks with Trastuzumab and Pertuzumab for a total of 6 cycles. After completion of the first 6 cycles, a determination will be made of adjuvant targeted therapy based on final pathology at the time of surgery. Given she is % will likely be on anti-estrogen therapy (tamoxifen given pre-menopausal) for 5 years.     First Infusion Loading Dose of Pertuzumab is 840 mg, followed by 420 mg every 3 weeks for 5 cycles, along with chemotherapy. First loading dose of Trastuzumab will be 8mg/Kg over 90 minutes followed 3 weeks later by the second Infusion of Trastuzumab 6mg/kg over 60 minutes Q 3 weeks and if tolerated, all subsequent Infusions of Trastuzumab but may be over 30 minutes until completion of 18 total cycles.     Overall side effects of chemotherapy were discussed including alopecia, immunosuppression, neutropenia, anemia, thrombocytopenia, and neuropathy. Other side effects such as nail changes, dry eyes, mouth sores, and bone pain were discussed. The need for growth factor support was also discussed and I recommended daily claritin following growth factor injection to help  with that. I have recommended Neulasta 24 to 48 hours status post chemotherapy. Pertuzumab can be associated with a mild to severe skin rash. Rare but serious side effects such as increased risk of cardiac toxicity were discussed with Herceptin and Perjeta. Monitoring of her cardiac function with MUGAs or an echocardiogram is strongly recommended every 6-9 weeks by NCCN guidelines while on milan-adjuvant Perjeta and Herceptin.        1. Malignant neoplasm of upper-outer quadrant of right breast in female, estrogen receptor positive    -- labs reviewed, ok for C1D1 of TCHP today  -- Consented for TCHP previously by Dr. Blakely, plan on 6-cycles then will undergo surgery   -- Patient will reach out to breast surgery to let them know if she wants a lumpectomy or mastectomy   -- If patients wants the lumpectomy will get biopsy of smaller breast mass before starting systemic treatment   -- BRCA testing negative (no pathologic mutations, had to VUSs in met and pten genes)  -- scheduled for growth factor tomorrow  -- home meds reviewed again  -- she has an integrative oncology referral in  -- rtc in 3 weeks for Cycle 2    Route Chart for Scheduling    Med Onc Chart Routing      Follow up with physician 3 weeks.   Follow up with GILBERTO 6 weeks. scheduled in 1 week for virtual follow up   Infusion scheduling note   every 3 weeks x 5 more   Injection scheduling note every 3 weeks, day after infusion   Labs CMP, CBC and B HCG   Scheduling:  Preferred lab:  Lab interval:  every 3 weeks   Imaging    Pharmacy appointment    Other referrals                  Treatment Plan Information   OP BREAST TCHP (pertuzumab trastuzumab DOCEtaxel CARBOplatin) Q3W   Kassie Blakely MD   Upcoming Treatment Dates - OP BREAST TCHP (pertuzumab trastuzumab DOCEtaxel CARBOplatin) Q3W    2/14/2024       Pre-Medications       palonosetron 0.25mg/dexAMETHasone 12mg in NS IVPB 0.25 mg 50 mL       Chemotherapy       pertuzumab (PERJETA) 840 mg in sodium  chloride 0.9% 278 mL infusion       trastuzumab-anns (KANJINTI) 747 mg in sodium chloride 0.9% 250 mL chemo infusion       DOCEtaxel (TAXOTERE) 75 mg/m2 = 154 mg in sodium chloride 0.9% 257.7 mL chemo infusion       CARBOplatin (PARAPLATIN) in sodium chloride 0.9% 250 mL chemo infusion       Supportive Care       prochlorperazine injection 10 mg       meperidine (PF) injection Soln 25 mg       Antiemetics       fosaprepitant 150 mg in sodium chloride 0.9% 150 mL IVPB  2/15/2024       Growth Factor       pegfilgrastim-cbqv (UDENYCA) injection 6 mg  3/6/2024       Chemotherapy       pertuzumab (PERJETA) 420 mg in sodium chloride 0.9% 264 mL infusion       trastuzumab-anns (KANJINTI) 560 mg in sodium chloride 0.9% 250 mL chemo infusion       DOCEtaxel (TAXOTERE) 75 mg/m2 = 154 mg in sodium chloride 0.9% 257.7 mL chemo infusion       CARBOplatin (PARAPLATIN) in sodium chloride 0.9% 250 mL chemo infusion       Supportive Care       prochlorperazine injection 10 mg       meperidine injection 25 mg       Antiemetics       palonosetron 0.25mg/dexAMETHasone 12mg in NS IVPB 0.25 mg 50 mL       fosaprepitant 150 mg in sodium chloride 0.9% 150 mL IVPB  3/7/2024       Growth Factor       pegfilgrastim-cbqv (UDENYCA) injection 6 mg    MDM includes  :    - Acute or chronic illness or injury that poses a threat to life or bodily function  - Independent review and explanation of 3+ results from unique tests  - Discussion of management and ordering 3+ unique tests  - Extensive discussion of treatment and management  - Prescription drug management  - Drug therapy requiring intensive monitoring for toxicity     Cristina Allen NP

## 2024-02-15 ENCOUNTER — OFFICE VISIT (OUTPATIENT)
Dept: HEMATOLOGY/ONCOLOGY | Facility: CLINIC | Age: 39
End: 2024-02-15
Payer: COMMERCIAL

## 2024-02-15 ENCOUNTER — INFUSION (OUTPATIENT)
Dept: INFUSION THERAPY | Facility: HOSPITAL | Age: 39
End: 2024-02-15
Payer: COMMERCIAL

## 2024-02-15 VITALS
WEIGHT: 212.75 LBS | SYSTOLIC BLOOD PRESSURE: 130 MMHG | DIASTOLIC BLOOD PRESSURE: 85 MMHG | TEMPERATURE: 99 F | BODY MASS INDEX: 36.32 KG/M2 | HEART RATE: 97 BPM | RESPIRATION RATE: 18 BRPM | HEIGHT: 64 IN

## 2024-02-15 VITALS
RESPIRATION RATE: 17 BRPM | TEMPERATURE: 100 F | HEIGHT: 64 IN | SYSTOLIC BLOOD PRESSURE: 146 MMHG | WEIGHT: 212.75 LBS | BODY MASS INDEX: 36.32 KG/M2 | OXYGEN SATURATION: 97 % | HEART RATE: 111 BPM | DIASTOLIC BLOOD PRESSURE: 94 MMHG

## 2024-02-15 DIAGNOSIS — C50.411 MALIGNANT NEOPLASM OF UPPER-OUTER QUADRANT OF RIGHT BREAST IN FEMALE, ESTROGEN RECEPTOR POSITIVE: Primary | ICD-10-CM

## 2024-02-15 DIAGNOSIS — Z17.0 MALIGNANT NEOPLASM OF UPPER-OUTER QUADRANT OF RIGHT BREAST IN FEMALE, ESTROGEN RECEPTOR POSITIVE: Primary | ICD-10-CM

## 2024-02-15 PROCEDURE — 3077F SYST BP >= 140 MM HG: CPT | Mod: CPTII,S$GLB,, | Performed by: NURSE PRACTITIONER

## 2024-02-15 PROCEDURE — 99215 OFFICE O/P EST HI 40 MIN: CPT | Mod: S$GLB,,, | Performed by: NURSE PRACTITIONER

## 2024-02-15 PROCEDURE — 96415 CHEMO IV INFUSION ADDL HR: CPT

## 2024-02-15 PROCEDURE — 96413 CHEMO IV INFUSION 1 HR: CPT

## 2024-02-15 PROCEDURE — 1159F MED LIST DOCD IN RCRD: CPT | Mod: CPTII,S$GLB,, | Performed by: NURSE PRACTITIONER

## 2024-02-15 PROCEDURE — 3008F BODY MASS INDEX DOCD: CPT | Mod: CPTII,S$GLB,, | Performed by: NURSE PRACTITIONER

## 2024-02-15 PROCEDURE — 96417 CHEMO IV INFUS EACH ADDL SEQ: CPT

## 2024-02-15 PROCEDURE — 63600175 PHARM REV CODE 636 W HCPCS: Performed by: INTERNAL MEDICINE

## 2024-02-15 PROCEDURE — 25000003 PHARM REV CODE 250: Performed by: INTERNAL MEDICINE

## 2024-02-15 PROCEDURE — 99999 PR PBB SHADOW E&M-EST. PATIENT-LVL IV: CPT | Mod: PBBFAC,,, | Performed by: NURSE PRACTITIONER

## 2024-02-15 PROCEDURE — 96367 TX/PROPH/DG ADDL SEQ IV INF: CPT

## 2024-02-15 PROCEDURE — 96375 TX/PRO/DX INJ NEW DRUG ADDON: CPT

## 2024-02-15 PROCEDURE — 3080F DIAST BP >= 90 MM HG: CPT | Mod: CPTII,S$GLB,, | Performed by: NURSE PRACTITIONER

## 2024-02-15 RX ORDER — SODIUM CHLORIDE 0.9 % (FLUSH) 0.9 %
10 SYRINGE (ML) INJECTION
Status: DISCONTINUED | OUTPATIENT
Start: 2024-02-15 | End: 2024-02-19 | Stop reason: HOSPADM

## 2024-02-15 RX ORDER — ONDANSETRON HYDROCHLORIDE 2 MG/ML
8 INJECTION, SOLUTION INTRAVENOUS ONCE
Status: DISCONTINUED | OUTPATIENT
Start: 2024-02-15 | End: 2024-02-19 | Stop reason: HOSPADM

## 2024-02-15 RX ORDER — SODIUM CHLORIDE 0.9 % (FLUSH) 0.9 %
10 SYRINGE (ML) INJECTION
Status: CANCELLED | OUTPATIENT
Start: 2024-02-15

## 2024-02-15 RX ORDER — PROCHLORPERAZINE EDISYLATE 5 MG/ML
10 INJECTION INTRAMUSCULAR; INTRAVENOUS ONCE AS NEEDED
Status: DISCONTINUED | OUTPATIENT
Start: 2024-02-15 | End: 2024-02-19 | Stop reason: HOSPADM

## 2024-02-15 RX ORDER — HEPARIN 100 UNIT/ML
500 SYRINGE INTRAVENOUS
Status: DISCONTINUED | OUTPATIENT
Start: 2024-02-15 | End: 2024-02-19 | Stop reason: HOSPADM

## 2024-02-15 RX ORDER — DIPHENHYDRAMINE HYDROCHLORIDE 50 MG/ML
50 INJECTION INTRAMUSCULAR; INTRAVENOUS ONCE AS NEEDED
Status: DISCONTINUED | OUTPATIENT
Start: 2024-02-15 | End: 2024-02-19 | Stop reason: HOSPADM

## 2024-02-15 RX ORDER — ONDANSETRON HYDROCHLORIDE 2 MG/ML
INJECTION, SOLUTION INTRAVENOUS
Status: DISPENSED
Start: 2024-02-15 | End: 2024-02-16

## 2024-02-15 RX ORDER — EPINEPHRINE 0.3 MG/.3ML
0.3 INJECTION SUBCUTANEOUS ONCE AS NEEDED
Status: DISCONTINUED | OUTPATIENT
Start: 2024-02-15 | End: 2024-02-19 | Stop reason: HOSPADM

## 2024-02-15 RX ORDER — MEPERIDINE HYDROCHLORIDE 50 MG/ML
25 INJECTION INTRAMUSCULAR; INTRAVENOUS; SUBCUTANEOUS ONCE AS NEEDED
Status: COMPLETED | OUTPATIENT
Start: 2024-02-15 | End: 2024-02-15

## 2024-02-15 RX ORDER — DIPHENHYDRAMINE HYDROCHLORIDE 50 MG/ML
50 INJECTION INTRAMUSCULAR; INTRAVENOUS ONCE AS NEEDED
Status: CANCELLED | OUTPATIENT
Start: 2024-02-15

## 2024-02-15 RX ORDER — PROCHLORPERAZINE EDISYLATE 5 MG/ML
10 INJECTION INTRAMUSCULAR; INTRAVENOUS ONCE AS NEEDED
Status: CANCELLED | OUTPATIENT
Start: 2024-02-15

## 2024-02-15 RX ORDER — EPINEPHRINE 0.3 MG/.3ML
0.3 INJECTION SUBCUTANEOUS ONCE AS NEEDED
Status: CANCELLED | OUTPATIENT
Start: 2024-02-15

## 2024-02-15 RX ORDER — HEPARIN 100 UNIT/ML
500 SYRINGE INTRAVENOUS
Status: CANCELLED | OUTPATIENT
Start: 2024-02-15

## 2024-02-15 RX ADMIN — FOSAPREPITANT 150 MG: 150 INJECTION, POWDER, LYOPHILIZED, FOR SOLUTION INTRAVENOUS at 04:02

## 2024-02-15 RX ADMIN — CARBOPLATIN 900 MG: 10 INJECTION, SOLUTION INTRAVENOUS at 05:02

## 2024-02-15 RX ADMIN — DEXAMETHASONE SODIUM PHOSPHATE 0.25 MG: 4 INJECTION, SOLUTION INTRA-ARTICULAR; INTRALESIONAL; INTRAMUSCULAR; INTRAVENOUS; SOFT TISSUE at 03:02

## 2024-02-15 RX ADMIN — PERTUZUMAB 840 MG: 30 INJECTION, SOLUTION, CONCENTRATE INTRAVENOUS at 11:02

## 2024-02-15 RX ADMIN — TRASTUZUMAB-ANNS 747 MG: 420 INJECTION, POWDER, LYOPHILIZED, FOR SOLUTION INTRAVENOUS at 01:02

## 2024-02-15 RX ADMIN — HEPARIN 500 UNITS: 100 SYRINGE at 06:02

## 2024-02-15 RX ADMIN — DOCETAXEL ANHYDROUS 154 MG: 10 INJECTION, SOLUTION INTRAVENOUS at 04:02

## 2024-02-15 RX ADMIN — MEPERIDINE HYDROCHLORIDE 25 MG: 50 INJECTION INTRAMUSCULAR; INTRAVENOUS; SUBCUTANEOUS at 02:02

## 2024-02-15 NOTE — PLAN OF CARE
1020 pt here for D1C1 PHTC, labs, hx, meds, allergies reviewed, pt with no complaints at this time, reclined in chair, warm blanket provided, continue to monitor

## 2024-02-15 NOTE — NURSING
1415 pt started with rigors during kanjinti infusion, stopped Kanjinti WE=956/116 OG=793 R=20, pt with no other complaints Dr. Zamorano . Cristina Allen made aware,     1421 Demerol 25 mg given , will monitor    1425 pt states starting to feel better, rigors starting to subside  AT=106/96 XY=533 R=17    1430 CC=633/84 WN=709 R=18    1440 WP=913/77 RB=322 R=18    1450 pt c/o nausea vomited approx. 100 ml green emesis, zofran 8 mg IVP

## 2024-02-16 ENCOUNTER — PATIENT MESSAGE (OUTPATIENT)
Dept: ADMINISTRATIVE | Facility: OTHER | Age: 39
End: 2024-02-16
Payer: COMMERCIAL

## 2024-02-16 ENCOUNTER — INFUSION (OUTPATIENT)
Dept: INFUSION THERAPY | Facility: HOSPITAL | Age: 39
End: 2024-02-16
Payer: COMMERCIAL

## 2024-02-16 DIAGNOSIS — Z17.0 MALIGNANT NEOPLASM OF UPPER-OUTER QUADRANT OF RIGHT BREAST IN FEMALE, ESTROGEN RECEPTOR POSITIVE: Primary | ICD-10-CM

## 2024-02-16 DIAGNOSIS — C50.411 MALIGNANT NEOPLASM OF UPPER-OUTER QUADRANT OF RIGHT BREAST IN FEMALE, ESTROGEN RECEPTOR POSITIVE: Primary | ICD-10-CM

## 2024-02-16 PROCEDURE — 63600175 PHARM REV CODE 636 W HCPCS: Mod: JZ,JG | Performed by: INTERNAL MEDICINE

## 2024-02-16 PROCEDURE — 96372 THER/PROPH/DIAG INJ SC/IM: CPT

## 2024-02-16 RX ADMIN — PEGFILGRASTIM-CBQV 6 MG: 6 INJECTION, SOLUTION SUBCUTANEOUS at 01:02

## 2024-02-16 NOTE — NURSING
Pt here for D2 Udenyca injection. Administered injection in back of right arm. No questions or concerns. Pt ambulated out of unit unassisted.

## 2024-02-16 NOTE — PLAN OF CARE
1806-Pt tolerated C1D1 PHTC infusion, VSS throughout treatment. Educated patient on medications administered including side effects, possible reactions, and chemotherapy precautions, verbalized understanding. Pt aware to call provider with any questions or concerns, aware of upcoming appts, ambulatory from clinic with steady gait, no distress noted.

## 2024-02-17 ENCOUNTER — PATIENT MESSAGE (OUTPATIENT)
Dept: ADMINISTRATIVE | Facility: OTHER | Age: 39
End: 2024-02-17
Payer: COMMERCIAL

## 2024-02-17 ENCOUNTER — PATIENT MESSAGE (OUTPATIENT)
Dept: HEMATOLOGY/ONCOLOGY | Facility: CLINIC | Age: 39
End: 2024-02-17
Payer: COMMERCIAL

## 2024-02-18 ENCOUNTER — PATIENT MESSAGE (OUTPATIENT)
Dept: ADMINISTRATIVE | Facility: OTHER | Age: 39
End: 2024-02-18
Payer: COMMERCIAL

## 2024-02-19 ENCOUNTER — PATIENT MESSAGE (OUTPATIENT)
Dept: ADMINISTRATIVE | Facility: OTHER | Age: 39
End: 2024-02-19
Payer: COMMERCIAL

## 2024-02-20 ENCOUNTER — PATIENT MESSAGE (OUTPATIENT)
Dept: ADMINISTRATIVE | Facility: OTHER | Age: 39
End: 2024-02-20
Payer: COMMERCIAL

## 2024-02-21 ENCOUNTER — PATIENT MESSAGE (OUTPATIENT)
Dept: HEMATOLOGY/ONCOLOGY | Facility: CLINIC | Age: 39
End: 2024-02-21
Payer: COMMERCIAL

## 2024-02-21 ENCOUNTER — PATIENT MESSAGE (OUTPATIENT)
Dept: ADMINISTRATIVE | Facility: OTHER | Age: 39
End: 2024-02-21
Payer: COMMERCIAL

## 2024-02-22 ENCOUNTER — PATIENT MESSAGE (OUTPATIENT)
Dept: ADMINISTRATIVE | Facility: OTHER | Age: 39
End: 2024-02-22
Payer: COMMERCIAL

## 2024-02-22 ENCOUNTER — OFFICE VISIT (OUTPATIENT)
Dept: HEMATOLOGY/ONCOLOGY | Facility: CLINIC | Age: 39
End: 2024-02-22
Payer: COMMERCIAL

## 2024-02-22 ENCOUNTER — PATIENT MESSAGE (OUTPATIENT)
Dept: HEMATOLOGY/ONCOLOGY | Facility: CLINIC | Age: 39
End: 2024-02-22

## 2024-02-22 DIAGNOSIS — T45.1X5A CHEMOTHERAPY INDUCED DIARRHEA: ICD-10-CM

## 2024-02-22 DIAGNOSIS — Z17.0 MALIGNANT NEOPLASM OF UPPER-OUTER QUADRANT OF RIGHT BREAST IN FEMALE, ESTROGEN RECEPTOR POSITIVE: Primary | ICD-10-CM

## 2024-02-22 DIAGNOSIS — C50.411 MALIGNANT NEOPLASM OF UPPER-OUTER QUADRANT OF RIGHT BREAST IN FEMALE, ESTROGEN RECEPTOR POSITIVE: Primary | ICD-10-CM

## 2024-02-22 DIAGNOSIS — R49.0 HOARSENESS: ICD-10-CM

## 2024-02-22 DIAGNOSIS — K52.1 DIARRHEA DUE TO DRUG: Primary | ICD-10-CM

## 2024-02-22 DIAGNOSIS — K52.1 CHEMOTHERAPY INDUCED DIARRHEA: ICD-10-CM

## 2024-02-22 DIAGNOSIS — R06.02 SOB (SHORTNESS OF BREATH) ON EXERTION: ICD-10-CM

## 2024-02-22 DIAGNOSIS — E86.0 DEHYDRATION: ICD-10-CM

## 2024-02-22 PROCEDURE — 99214 OFFICE O/P EST MOD 30 MIN: CPT | Mod: 95,,, | Performed by: NURSE PRACTITIONER

## 2024-02-22 RX ORDER — SODIUM CHLORIDE 0.9 % (FLUSH) 0.9 %
10 SYRINGE (ML) INJECTION
OUTPATIENT
Start: 2024-02-22

## 2024-02-22 RX ORDER — HEPARIN 100 UNIT/ML
500 SYRINGE INTRAVENOUS
OUTPATIENT
Start: 2024-02-22

## 2024-02-22 RX ORDER — DIPHENOXYLATE HYDROCHLORIDE AND ATROPINE SULFATE 2.5; .025 MG/1; MG/1
1 TABLET ORAL 4 TIMES DAILY PRN
Qty: 30 TABLET | Refills: 1 | Status: SHIPPED | OUTPATIENT
Start: 2024-02-22 | End: 2024-03-03

## 2024-02-22 NOTE — PROGRESS NOTES
[facility]/ [facility]      Chief Complaint:   Encounter Diagnoses   Name Primary?    Malignant neoplasm of upper-outer quadrant of right breast in female, estrogen receptor positive Yes    Hoarseness     Chemotherapy induced diarrhea     Dehydration     SOB (shortness of breath) on exertion         Cancer Staging   Malignant neoplasm of upper-outer quadrant of right breast in female, estrogen receptor positive  Staging form: Breast, AJCC 8th Edition  - Clinical stage from 1/18/2024: Stage IB (cT2, cN0, cM0, G3, ER+, ID+, HER2+) - Signed by Kassie Blakely MD on 1/18/2024    The patient location is: home/[place]  The chief complaint leading to consultation is: 1 week post chemo follow up    Visit type: audio only    Face to Face time with patient: 20 minute telephone encounter  30 minutes of total time spent on the encounter, which includes face to face time and non-face to face time preparing to see the patient (eg, review of tests), Obtaining and/or reviewing separately obtained history, Documenting clinical information in the electronic or other health record, Independently interpreting results (not separately reported) and communicating results to the patient/family/caregiver, or Care coordination (not separately reported).     Each patient to whom he or she provides medical services by telemedicine is:  (1) informed of the relationship between the physician and patient and the respective role of any other health care provider with respect to management of the patient; and (2) notified that he or she may decline to receive medical services by telemedicine and may withdraw from such care at any time.    Notes:     HPI:  Madhu Dela Cruz is a 38 y.o. female who presents today for evaluation of newly diagnosed breast cancer. Oncologic history below:     Per Dr. Blakely's note:   6/7/2023 - Screening mammogram with no evidence of malignancy    2024 - Diagnostic mammogram (palpable right breast mass) with  2.2 x 1.8 x 2.1 cm right breast mass   2024 - Needle biopsy of right breast with IDC grade 3, 8mm in greatest dimension, %, ID 40%, HER2 3+, Ki-67 >90%.  24 - Breast MRI with right breast 2.4 cm x 2.1 cm x 2.3 cm mass at the 3 o'clock position. A smaller benign appearing right breast mass. Normal axillary lymph nodes.     Gyn History:   Menarche: Age 10   Menopause: Pre    Age at first pregnancy: 15  : 2nd child, not first  HRT: Was on hormonal birth control, stopped   Does not desire fertility preservation.   Family History: Maternal aunt, diagnosed later in life. No ovarian cancer.     She lives in [facility] with her , they run a photo lubin business.      Interval hx:   Patient presents today one week s/p C1D1 of TCHP.   She had rigors during herceptin/perjeta with first cycle.  Given demerol and zofran and rigors resolved.  Pt was able to get the remainder of her treatment.  Was feeling well until day 5.  Then she started with generalized weakness, fatigue, diarrhea, and lost her voice.  Having watery stools 6 to 8 times per day.  Taking imodium 3 to 4 times per day without relief.  Paulina Rivera NP sent her in lomotil today.  Very hoarse voice, no sore throat or mouth ulcers.  No n/v.  Has decreased appetite, but trying to eat.  Today notes some intermittent sob with exertion.  Also with some generalized aches    Social History     Tobacco Use    Smoking status: Never    Smokeless tobacco: Never   Substance Use Topics    Alcohol use: Yes     Comment: 3xwk     Drug use: No     Family History   Problem Relation Age of Onset    Breast cancer Maternal Aunt     Colon cancer Neg Hx     Ovarian cancer Neg Hx      Past Medical History:   Diagnosis Date    Esophageal reflux     Migraine      No past surgical history on file.    Patient Active Problem List   Diagnosis    Malignant neoplasm of upper-outer  quadrant of right breast in female, estrogen receptor positive    Chemotherapy induced diarrhea    Dehydration       Current Outpatient Medications   Medication Instructions    dexAMETHasone (DECADRON) 4 MG Tab Take 2 tablets by mouth daily on days 2-4 of each chemotherapy cycle.    diphenoxylate-atropine 2.5-0.025 mg (LOMOTIL) 2.5-0.025 mg per tablet 1 tablet, Oral, 4 times daily PRN    azar's soln (benadryl 30 mL, mylanta 30 mL, LIDOcaine 30 mL, nystatin 30 mL) 120mL 10 mLs, Oral, 4 times daily, Swish and swallow    ergocalciferol, vitamin D2, (VITAMIN D ORAL) No dose, route, or frequency recorded.    LACTOBACILLUS ACIDOPHILUS ORAL No dose, route, or frequency recorded.    LIDOcaine-prilocaine (EMLA) cream Topical (Top), As needed (PRN)    nystatin (MYCOSTATIN) 600,000 Units, Oral, 4 times daily with meals & nightly    OLANZapine (ZYPREXA) 5 MG tablet Take 1 tablet by mouth nightly on days 1-4 of each chemotherapy cycle.    omega 3-dha-epa-fish oil 1,200 (144-216) mg Cap No dose, route, or frequency recorded.    ondansetron (ZOFRAN) 4 MG tablet No dose, route, or frequency recorded.    pantoprazole (PROTONIX) 40 MG tablet No dose, route, or frequency recorded.    prochlorperazine (COMPAZINE) 10 mg, Oral, Every 6 hours PRN    semaglutide (OZEMPIC SUBQ) No dose, route, or frequency recorded.       Review of Systems:   Review of Systems   Constitutional:  Positive for malaise/fatigue. Negative for chills and fever.   HENT:  Negative for congestion and sore throat.         Hoarse voice   Eyes:  Negative for pain.   Respiratory:  Positive for shortness of breath. Negative for cough.         Sob with exertion   Cardiovascular:  Negative for chest pain, palpitations and leg swelling.   Gastrointestinal:  Positive for diarrhea. Negative for abdominal pain, nausea and vomiting.   Genitourinary:  Negative for dysuria and frequency.   Musculoskeletal:  Negative for back pain.   Skin:  Negative for rash.   Neurological:   Positive for weakness. Negative for dizziness and headaches.   Psychiatric/Behavioral:  The patient is nervous/anxious.        PHYSICAL EXAM:  LMP 01/26/2024     Phone visit: pt could not log on virtually, no physical exam  Speaking in complete sentences without respiratory distress  Voice noted to be barely audible    Pertinent Labs & Imaging:  Reviewed recent labs, imaging and pathology.     Assessment & Plan:    Patient presents for management of newly diagnosed breast cancer that was found on diagnostic mammogram after patient noticed a palpable right breast mass. Had a needle biopsy of right breast with IDC grade 3, 8mm in greatest dimension, %, DC 40%, HER2 3+, Ki-67 >90%. Had a MRI which revealed a 2.4 cm x 2.1 cm x 2.3 cm right breast mass at the 3 o'clock position. A smaller benign appearing right breast mass. Normal axillary lymph nodes. She met with breast surgery who recommended systemic therapy before any surgical interventions. After completing systemic therapy her options will be a lumpectomy with radiation or just a mastectomy. If she decides on the lumpectomy surgery wants a biopsy of the smaller breast mass to confirm it is benign, if she gets a mastectomy then the biopsy is not needed. Due to her being T2 (tumor >2cm on MRI) milan-adjuvant treatment followed by surgery is indicated. Getting milan-adjuvant treatment will also allow her to receive Kadcyla after surgery if there is any residual disease seen. Given her age and minimal medical history will favor treatment with TCHP (docetaxel/carboplatin/trastuzumab/pertuzumab).     Today we discussed her recent diagnosis and the natural history of Her2+ breast cancer. Given that her tumor is as least T2N0, and that she is otherwise healthy, would recommend proceed with neoadjuvant treatment with TCHP. I reviewed the dosing, schedule and potential side effects of this regimen as below: Docetaxel at 75 mg/meter squared), Carboplatin (at an AUC of 6)  given every 3 weeks with Trastuzumab and Pertuzumab for a total of 6 cycles. After completion of the first 6 cycles, a determination will be made of adjuvant targeted therapy based on final pathology at the time of surgery. Given she is % will likely be on anti-estrogen therapy (tamoxifen given pre-menopausal) for 5 years.     First Infusion Loading Dose of Pertuzumab is 840 mg, followed by 420 mg every 3 weeks for 5 cycles, along with chemotherapy. First loading dose of Trastuzumab will be 8mg/Kg over 90 minutes followed 3 weeks later by the second Infusion of Trastuzumab 6mg/kg over 60 minutes Q 3 weeks and if tolerated, all subsequent Infusions of Trastuzumab but may be over 30 minutes until completion of 18 total cycles.     Overall side effects of chemotherapy were discussed including alopecia, immunosuppression, neutropenia, anemia, thrombocytopenia, and neuropathy. Other side effects such as nail changes, dry eyes, mouth sores, and bone pain were discussed. The need for growth factor support was also discussed and I recommended daily claritin following growth factor injection to help with that. I have recommended Neulasta 24 to 48 hours status post chemotherapy. Pertuzumab can be associated with a mild to severe skin rash. Rare but serious side effects such as increased risk of cardiac toxicity were discussed with Herceptin and Perjeta. Monitoring of her cardiac function with MUGAs or an echocardiogram is strongly recommended every 6-9 weeks by NCCN guidelines while on milan-adjuvant Perjeta and Herceptin.        1. Malignant neoplasm of upper-outer quadrant of right breast in female, estrogen receptor positive  -- 1 week s/p C1D1 of TCHP today  -- having some significant side effects one week out, will have pt come into clinic tomorrow to asses  -- had rigors with herceptin/perjeta with C1, resolved with demerol and zofran  -- Consented for TCHP previously by Dr. Blakely, plan on 6-cycles then will  undergo surgery   -- Patient will reach out to breast surgery to let them know if she wants a lumpectomy or mastectomy   -- If patients wants the lumpectomy will get biopsy of smaller breast mass before starting systemic treatment   -- BRCA testing negative (no pathologic mutations, had to VUSs in met and pten genes)  -- scheduled for growth factor tomorrow  -- home meds reviewed again  -- she has an integrative oncology referral in  -- rtc as previously scheduled    - duke's soln (benadryl 30 mL, mylanta 30 mL, LIDOcaine 30 mL, nystatin 30 mL) 120mL; Take 10 mLs by mouth 4 (four) times daily. Swish and swallow  Dispense: 120 mL; Refill: 3  - CBC Oncology; Future  - Comprehensive Metabolic Panel; Future  - Magnesium; Future    2. Hoarseness  -- no throat pain or mouth ulcers  -- rx duke solution  -- have pt come into clinic tomorrow for better assessment  -- continue with warm tea and honey    - duke's soln (benadryl 30 mL, mylanta 30 mL, LIDOcaine 30 mL, nystatin 30 mL) 120mL; Take 10 mLs by mouth 4 (four) times daily. Swish and swallow  Dispense: 120 mL; Refill: 3    3. Chemotherapy induced diarrhea  -- imodium not helping  -- lomotil sent in  -- iv fluids ordered and scheduled for tomorrow    4. Dehydration  -- continue to push fluids at home  -- scheduled for iv fluids tomorrow    5. SOB (shortness of breath) on exertion  -- noticing with daily activities around the house  -- will bring into clinic tomorrow to better assess, labs prior  -- ER precautions reviewed    Route Chart for Scheduling    Med Onc Chart Routing      Follow up with physician    Follow up with GILBERTO . Coming in tomorrow for in person follow up   Infusion scheduling note    Injection scheduling note    Labs    Imaging    Pharmacy appointment    Other referrals                  Treatment Plan Information   OP BREAST TCHP (pertuzumab trastuzumab DOCEtaxel CARBOplatin) Q3W   Kassie Blakely MD   Upcoming Treatment Dates - OP BREAST TCHP  (pertuzumab trastuzumab DOCEtaxel CARBOplatin) Q3W    3/7/2024       Chemotherapy       pertuzumab (PERJETA) 420 mg in sodium chloride 0.9% 264 mL infusion       trastuzumab-anns (KANJINTI) 560 mg in sodium chloride 0.9% 250 mL chemo infusion       DOCEtaxel (TAXOTERE) 75 mg/m2 = 154 mg in sodium chloride 0.9% 257.7 mL chemo infusion       CARBOplatin (PARAPLATIN) in sodium chloride 0.9% 250 mL chemo infusion       Supportive Care       prochlorperazine injection 10 mg       meperidine injection 25 mg       Antiemetics       palonosetron 0.25mg/dexAMETHasone 12mg in NS IVPB 0.25 mg 50 mL       fosaprepitant 150 mg in sodium chloride 0.9% 150 mL IVPB  3/8/2024       Growth Factor       pegfilgrastim-cbqv (UDENYCA) injection 6 mg  3/28/2024       Chemotherapy       pertuzumab (PERJETA) 420 mg in sodium chloride 0.9% 264 mL infusion       trastuzumab-anns (KANJINTI) 560 mg in sodium chloride 0.9% 250 mL chemo infusion       DOCEtaxel (TAXOTERE) 75 mg/m2 = 154 mg in sodium chloride 0.9% 257.7 mL chemo infusion       CARBOplatin (PARAPLATIN) in sodium chloride 0.9% 250 mL chemo infusion       Supportive Care       prochlorperazine injection 10 mg       meperidine injection 25 mg       Antiemetics       fosaprepitant 150 mg in sodium chloride 0.9% 150 mL IVPB       palonosetron 0.25mg/dexAMETHasone 12mg in NS IVPB 0.25 mg 50 mL  3/29/2024       Growth Factor       pegfilgrastim-cbqv (UDENYCA) injection 6 mg    Supportive Plan Information  IV FLUIDS AND ELECTROLYTES   Kassie Blakely MD   Upcoming Treatment Dates - IV FLUIDS AND ELECTROLYTES    No upcoming days in selected categories.    Urgent Care Oncology Visit    Date and Time: 02/23/2024 11:33 AM   Patient MRN: [id number]   Chief Complaint: No chief complaint on file.    Reason for Urgent Care Visit: f/u post C1D1 treatment  Patient Type: active chemotherapy/immunotherapy treatment  Intervention:  will bring in tomorrow for in person urgent care appt  Dispo: return to  clinic for urgent care oncology follow up  UrgOnc appointment addressed via:  scheduled  This UrgOnc visit was virtual  Time spent handling UC issue:  20 minutes    Was this virtual or in person UrgOnc visit appropriate? Yes    Cristina Allen NP

## 2024-02-23 ENCOUNTER — TELEPHONE (OUTPATIENT)
Dept: HEMATOLOGY/ONCOLOGY | Facility: CLINIC | Age: 39
End: 2024-02-23

## 2024-02-23 ENCOUNTER — PATIENT MESSAGE (OUTPATIENT)
Dept: HEMATOLOGY/ONCOLOGY | Facility: CLINIC | Age: 39
End: 2024-02-23

## 2024-02-23 ENCOUNTER — CLINICAL SUPPORT (OUTPATIENT)
Dept: HEMATOLOGY/ONCOLOGY | Facility: CLINIC | Age: 39
End: 2024-02-23
Payer: COMMERCIAL

## 2024-02-23 ENCOUNTER — OFFICE VISIT (OUTPATIENT)
Dept: HEMATOLOGY/ONCOLOGY | Facility: CLINIC | Age: 39
End: 2024-02-23
Payer: COMMERCIAL

## 2024-02-23 ENCOUNTER — HOSPITAL ENCOUNTER (OUTPATIENT)
Dept: RADIOLOGY | Facility: HOSPITAL | Age: 39
Discharge: HOME | End: 2024-02-23
Attending: NURSE PRACTITIONER
Payer: COMMERCIAL

## 2024-02-23 VITALS
SYSTOLIC BLOOD PRESSURE: 123 MMHG | OXYGEN SATURATION: 97 % | RESPIRATION RATE: 20 BRPM | HEIGHT: 64 IN | HEART RATE: 116 BPM | BODY MASS INDEX: 35.13 KG/M2 | WEIGHT: 205.81 LBS | DIASTOLIC BLOOD PRESSURE: 77 MMHG | TEMPERATURE: 98 F

## 2024-02-23 DIAGNOSIS — R49.0 HOARSENESS: ICD-10-CM

## 2024-02-23 DIAGNOSIS — R00.0 TACHYCARDIA: ICD-10-CM

## 2024-02-23 DIAGNOSIS — B37.0 THRUSH: ICD-10-CM

## 2024-02-23 DIAGNOSIS — Z13.9 ENCOUNTER FOR SCREENING: Primary | ICD-10-CM

## 2024-02-23 DIAGNOSIS — T45.1X5A CHEMOTHERAPY INDUCED DIARRHEA: ICD-10-CM

## 2024-02-23 DIAGNOSIS — E86.0 DEHYDRATION: ICD-10-CM

## 2024-02-23 DIAGNOSIS — K52.1 CHEMOTHERAPY INDUCED DIARRHEA: ICD-10-CM

## 2024-02-23 DIAGNOSIS — C50.411 MALIGNANT NEOPLASM OF UPPER-OUTER QUADRANT OF RIGHT BREAST IN FEMALE, ESTROGEN RECEPTOR POSITIVE: Primary | ICD-10-CM

## 2024-02-23 DIAGNOSIS — Z17.0 MALIGNANT NEOPLASM OF UPPER-OUTER QUADRANT OF RIGHT BREAST IN FEMALE, ESTROGEN RECEPTOR POSITIVE: Primary | ICD-10-CM

## 2024-02-23 DIAGNOSIS — R06.02 SOB (SHORTNESS OF BREATH) ON EXERTION: ICD-10-CM

## 2024-02-23 DIAGNOSIS — U07.1 COVID-19: Primary | ICD-10-CM

## 2024-02-23 LAB — SARS-COV-2 RDRP RESP QL NAA+PROBE: POSITIVE

## 2024-02-23 PROCEDURE — 99999 PR PBB SHADOW E&M-EST. PATIENT-LVL IV: CPT | Mod: PBBFAC,,, | Performed by: NURSE PRACTITIONER

## 2024-02-23 PROCEDURE — 71046 X-RAY EXAM CHEST 2 VIEWS: CPT | Mod: 26,,, | Performed by: RADIOLOGY

## 2024-02-23 PROCEDURE — 99215 OFFICE O/P EST HI 40 MIN: CPT | Mod: S$GLB,,, | Performed by: NURSE PRACTITIONER

## 2024-02-23 PROCEDURE — 3008F BODY MASS INDEX DOCD: CPT | Mod: CPTII,S$GLB,, | Performed by: NURSE PRACTITIONER

## 2024-02-23 PROCEDURE — 3074F SYST BP LT 130 MM HG: CPT | Mod: CPTII,S$GLB,, | Performed by: NURSE PRACTITIONER

## 2024-02-23 PROCEDURE — 3078F DIAST BP <80 MM HG: CPT | Mod: CPTII,S$GLB,, | Performed by: NURSE PRACTITIONER

## 2024-02-23 PROCEDURE — 71046 X-RAY EXAM CHEST 2 VIEWS: CPT | Mod: TC,FY

## 2024-02-23 PROCEDURE — 1159F MED LIST DOCD IN RCRD: CPT | Mod: CPTII,S$GLB,, | Performed by: NURSE PRACTITIONER

## 2024-02-23 PROCEDURE — U0002 COVID-19 LAB TEST NON-CDC: HCPCS | Performed by: NURSE PRACTITIONER

## 2024-02-23 PROCEDURE — 99999 PR PBB SHADOW E&M-EST. PATIENT-LVL I: CPT | Mod: PBBFAC,,, | Performed by: NURSE PRACTITIONER

## 2024-02-23 RX ORDER — NYSTATIN 100000 [USP'U]/ML
6 SUSPENSION ORAL
Qty: 240 ML | Refills: 0 | Status: SHIPPED | OUTPATIENT
Start: 2024-02-23 | End: 2024-03-04

## 2024-02-23 NOTE — PROGRESS NOTES
[facility]/ [facility]      Chief Complaint:   Encounter Diagnoses   Name Primary?    Malignant neoplasm of upper-outer quadrant of right breast in female, estrogen receptor positive Yes    SOB (shortness of breath) on exertion     Tachycardia     Thrush     Hoarseness     Chemotherapy induced diarrhea     Dehydration       Cancer Staging   Malignant neoplasm of upper-outer quadrant of right breast in female, estrogen receptor positive  Staging form: Breast, AJCC 8th Edition  - Clinical stage from 2024: Stage IB (cT2, cN0, cM0, G3, ER+, [place]+, HER2+) - Signed by Kassie Blakely MD on 2024    HPI:  Madhu Dela Cruz is a 38 y.o. female who presents today for evaluation of newly diagnosed breast cancer. Oncologic history below:     Per Dr. Blakely's note:   2023 - Screening mammogram with no evidence of malignancy   2024 - Diagnostic mammogram (palpable right breast mass) with  2.2 x 1.8 x 2.1 cm right breast mass   2024 - Needle biopsy of right breast with IDC grade 3, 8mm in greatest dimension, %, [place] 40%, HER2 3+, Ki-67 >90%.  24 - Breast MRI with right breast 2.4 cm x 2.1 cm x 2.3 cm mass at the 3 o'clock position. A smaller benign appearing right breast mass. Normal axillary lymph nodes.     Gyn History:   Menarche: Age 10   Menopause: Pre    Age at first pregnancy: 15  : 2nd child, not first  HRT: Was on hormonal birth control, stopped   Does not desire fertility preservation.   Family History: Maternal aunt, diagnosed later in life. No ovarian cancer.     She lives in [facility] with her , they run a photo lubin business.      Interval hx:   Patient presents today one week s/p C1D1 of TCHP.   She had rigors during herceptin/perjeta with first cycle.  Given demerol and zofran and rigors resolved.  Pt was able to get the remainder of her treatment.  Was feeling well until day 5.   Then she started with generalized weakness, fatigue, diarrhea, and lost her voice.  Having watery stools 6 to 8 times per day.  Taking imodium 3 to 4 times per day without relief.  Paulina Rivera NP sent her in lomotil today.  Very hoarse voice, no sore throat or mouth ulcers.  No n/v.  Has decreased appetite, but trying to eat.  Today notes some intermittent sob with exertion.  Also with some generalized aches  I saw her yesterday for a virtual visit and asked her to come in today given her symptoms.     Of note, her son did test positive for covid yesterday    Social History     Tobacco Use    Smoking status: Never    Smokeless tobacco: Never   Substance Use Topics    Alcohol use: Yes     Comment: 3xwk     Drug use: No     Family History   Problem Relation Age of Onset    Breast cancer Maternal Aunt     Colon cancer Neg Hx     Ovarian cancer Neg Hx      Past Medical History:   Diagnosis Date    Esophageal reflux     Migraine      No past surgical history on file.    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    diphenoxylate-atropine 2.5-0.025 mg (LOMOTIL) 2.5-0.025 mg per tablet 1 tablet, Oral, 4 times daily PRN    azar's soln (benadryl 30 mL, mylanta 30 mL, LIDOcaine 30 mL, nystatin 30 mL) 120mL 10 mLs, Oral, 4 times daily, Swish and swallow    ergocalciferol, vitamin D2, (VITAMIN D ORAL) No dose, route, or frequency recorded.    LACTOBACILLUS ACIDOPHILUS ORAL No dose, route, or frequency recorded.    LIDOcaine-prilocaine (EMLA) cream Topical (Top), As needed (PRN)    nystatin (MYCOSTATIN) 600,000 Units, Oral, 4 times daily with meals & nightly    OLANZapine (ZYPREXA) 5 MG tablet Take 1 tablet by  "mouth nightly on days 1-4 of each chemotherapy cycle.    omega 3-dha-epa-fish oil 1,200 (144-216) mg Cap No dose, route, or frequency recorded.    ondansetron (ZOFRAN) 4 MG tablet No dose, route, or frequency recorded.    pantoprazole (PROTONIX) 40 MG tablet No dose, route, or frequency recorded.    prochlorperazine (COMPAZINE) 10 mg, Oral, Every 6 hours PRN    semaglutide (OZEMPIC SUBQ) No dose, route, or frequency recorded.       Review of Systems:   Review of Systems   Constitutional:  Positive for malaise/fatigue. Negative for chills and fever.   HENT:  Negative for congestion and sore throat.         Voice loss, mouth and throat feel numb   Eyes:  Negative for pain.   Respiratory:  Positive for shortness of breath. Negative for cough.         With exertion   Cardiovascular:  Negative for chest pain, palpitations and leg swelling.   Gastrointestinal:  Positive for diarrhea. Negative for abdominal pain, nausea and vomiting.   Genitourinary:  Negative for dysuria and frequency.   Musculoskeletal:  Negative for back pain.   Skin:  Negative for rash.   Neurological:  Positive for weakness. Negative for dizziness and headaches.   Psychiatric/Behavioral:  The patient is nervous/anxious.        PHYSICAL EXAM:  /77   Pulse (!) 116   Temp 98.1 °F (36.7 °C) (Oral)   Resp 20   Ht 5' 4" (1.626 m)   Wt 93.3 kg (205 lb 12.8 oz)   LMP 01/26/2024   SpO2 97%   BMI 35.33 kg/m²     ECOG 0  General: well appearing, in no apparent distress  HEENT: Normocephalic, EOMI, anicteric sclerae, white plaque noted to back of tongue  Neck: supple, without cervical or supraclavicular lymphadenopathy.  Heart: regular rate and rhythm, normal S1 and S2, no murmurs, gallops or rubs.  Lungs: Clear to auscultation bilaterally, no increased wob  Breast: deferred today    Abdomen: Soft, nontender, nondistended with normal bowel sounds. No hepatosplenomegaly.  Extremities: No LE edema or joint effusion  Skin: warm, well-perfused, no " rash  Neurologic: Alert and oriented x 4, normal speech and gait   Psychiatric: Conversing appropriately with providers throughout today's encounter.    Pertinent Labs & Imaging:  Reviewed recent labs, imaging and pathology.     Assessment & Plan:    Patient presents for management of newly diagnosed breast cancer that was found on diagnostic mammogram after patient noticed a palpable right breast mass. Had a needle biopsy of right breast with IDC grade 3, 8mm in greatest dimension, %, [place] 40%, HER2 3+, Ki-67 >90%. Had a MRI which revealed a 2.4 cm x 2.1 cm x 2.3 cm right breast mass at the 3 o'clock position. A smaller benign appearing right breast mass. Normal axillary lymph nodes. She met with breast surgery who recommended systemic therapy before any surgical interventions. After completing systemic therapy her options will be a lumpectomy with radiation or just a mastectomy. If she decides on the lumpectomy surgery wants a biopsy of the smaller breast mass to confirm it is benign, if she gets a mastectomy then the biopsy is not needed. Due to her being T2 (tumor >2cm on MRI) milan-adjuvant treatment followed by surgery is indicated. Getting milan-adjuvant treatment will also allow her to receive Kadcyla after surgery if there is any residual disease seen. Given her age and minimal medical history will favor treatment with TCHP (docetaxel/carboplatin/trastuzumab/pertuzumab).     Today we discussed her recent diagnosis and the natural history of Her2+ breast cancer. Given that her tumor is as least T2N0, and that she is otherwise healthy, would recommend proceed with neoadjuvant treatment with TCHP. I reviewed the dosing, schedule and potential side effects of this regimen as below: Docetaxel at 75 mg/meter squared), Carboplatin (at an AUC of 6) given every 3 weeks with Trastuzumab and Pertuzumab for a total of 6 cycles. After completion of the first 6 cycles, a determination will be made of adjuvant targeted  therapy based on final pathology at the time of surgery. Given she is % will likely be on anti-estrogen therapy (tamoxifen given pre-menopausal) for 5 years.     First Infusion Loading Dose of Pertuzumab is 840 mg, followed by 420 mg every 3 weeks for 5 cycles, along with chemotherapy. First loading dose of Trastuzumab will be 8mg/Kg over 90 minutes followed 3 weeks later by the second Infusion of Trastuzumab 6mg/kg over 60 minutes Q 3 weeks and if tolerated, all subsequent Infusions of Trastuzumab but may be over 30 minutes until completion of 18 total cycles.     Overall side effects of chemotherapy were discussed including alopecia, immunosuppression, neutropenia, anemia, thrombocytopenia, and neuropathy. Other side effects such as nail changes, dry eyes, mouth sores, and bone pain were discussed. The need for growth factor support was also discussed and I recommended daily claritin following growth factor injection to help with that. I have recommended Neulasta 24 to 48 hours status post chemotherapy. Pertuzumab can be associated with a mild to severe skin rash. Rare but serious side effects such as increased risk of cardiac toxicity were discussed with Herceptin and Perjeta. Monitoring of her cardiac function with MUGAs or an echocardiogram is strongly recommended every 6-9 weeks by NCCN guidelines while on milan-adjuvant Perjeta and Herceptin.        1. Malignant neoplasm of upper-outer quadrant of right breast in female, estrogen receptor positive  -- 1 week s/p C1D1 of TCHP today  -- having some significant side effects one week out  -- had rigors with herceptin/perjeta with C1, resolved with demerol and zofran  -- Consented for TCHP previously by Dr. Blakely, plan on 6-cycles then will undergo surgery   -- Patient will reach out to breast surgery to let them know if she wants a lumpectomy or mastectomy   -- If patients wants the lumpectomy will get biopsy of smaller breast mass before starting systemic  treatment   -- BRCA testing negative (no pathologic mutations, had to VUSs in met and pten genes)  -- scheduled for growth factor tomorrow  -- home meds reviewed again  -- she has an integrative oncology referral in  -- rtc as previously scheduled    2. SOB (shortness of breath) on exertion  --Son positive for covid yesterday  --Will test for covid, get chest xray, and EKG  --Labs reviewed, wbc elevated  --No fevers    - COVID-19 Routine Screening; Standing  - COVID-19 Routine Screening  - X-Ray Chest PA And Lateral; Future  - EKG 12-lead; Future    3. Tachycardia  - EKG 12-lead; Future    4. Thrush  5. Hoarseness  --Marr's solutions sent in yesterday, pt has not picked up  --Will send in nystatin solution if duke's solution is not tolerated or working on hoarseness, mouth numbness    - nystatin (MYCOSTATIN) 100,000 unit/mL suspension; Take 6 mLs (600,000 Units total) by mouth 4 (four) times daily with meals and nightly. for 10 days  Dispense: 240 mL; Refill: 0    6. Chemotherapy induced diarrhea  --Taking imodium without much relief  --lomotil sent in yesterday  --Labs reviewed today  --Getting adequate oral hydration athome  --Getting iv fluids today as long as covid negative    7. Dehydration  --Getting IV fluids today as long as covid negative  --Will continue to push fluids at home    Route Chart for Scheduling    Med Onc Chart Routing      Follow up with physician . Scheduled for 3/6   Follow up with GILBERTO    Infusion scheduling note   every 3 weeks   Injection scheduling note every 3 weeks, day after infusion   Labs CBC, CMP and B HCG   Scheduling:  Preferred lab:  Lab interval:  every 3 weeks   Imaging    Pharmacy appointment    Other referrals                Treatment Plan Information   OP BREAST TCHP (pertuzumab trastuzumab DOCEtaxel CARBOplatin) Q3W   Kassie Blakely MD   Upcoming Treatment Dates - OP BREAST TCHP (pertuzumab trastuzumab DOCEtaxel CARBOplatin) Q3W    3/7/2024       Chemotherapy        pertuzumab (PERJETA) 420 mg in sodium chloride 0.9% 264 mL infusion       trastuzumab-anns (KANJINTI) 560 mg in sodium chloride 0.9% 250 mL chemo infusion       DOCEtaxel (TAXOTERE) 75 mg/m2 = 154 mg in sodium chloride 0.9% 257.7 mL chemo infusion       CARBOplatin (PARAPLATIN) in sodium chloride 0.9% 250 mL chemo infusion       Supportive Care       prochlorperazine injection 10 mg       meperidine injection 25 mg       Antiemetics       palonosetron 0.25mg/dexAMETHasone 12mg in NS IVPB 0.25 mg 50 mL       fosaprepitant 150 mg in sodium chloride 0.9% 150 mL IVPB  3/8/2024       Growth Factor       pegfilgrastim-cbqv (UDENYCA) injection 6 mg  3/28/2024       Chemotherapy       pertuzumab (PERJETA) 420 mg in sodium chloride 0.9% 264 mL infusion       trastuzumab-anns (KANJINTI) 560 mg in sodium chloride 0.9% 250 mL chemo infusion       DOCEtaxel (TAXOTERE) 75 mg/m2 = 154 mg in sodium chloride 0.9% 257.7 mL chemo infusion       CARBOplatin (PARAPLATIN) in sodium chloride 0.9% 250 mL chemo infusion       Supportive Care       prochlorperazine injection 10 mg       meperidine injection 25 mg       Antiemetics       fosaprepitant 150 mg in sodium chloride 0.9% 150 mL IVPB       palonosetron 0.25mg/dexAMETHasone 12mg in NS IVPB 0.25 mg 50 mL  3/29/2024       Growth Factor       pegfilgrastim-cbqv (UDENYCA) injection 6 mg    Supportive Plan Information  IV FLUIDS AND ELECTROLYTES   Kassie Blakely MD   Upcoming Treatment Dates - IV FLUIDS AND ELECTROLYTES    No upcoming days in selected categories.    Urgent Care Oncology Visit    Date and Time: 02/23/2024 11:25 AM   Patient MRN: [id number]   Chief Complaint:   Chief Complaint   Patient presents with    Malignant neoplasm of upper-outer quadrant of right breast      Reason for Urgent Care Visit: diarrhea, fatigue, and sob  Patient Type: active chemotherapy/immunotherapy treatment  Intervention: labs ordered, prescriptions sent, sent to infusion / IV fluids, and imaging  ordered  Dispo: return to clinic as previous  UrgOnc appointment addressed via: [facility] message  This UrgOnc visit was in person  Time spent handling UC issue:  45 minutes    Was this virtual or in person UrgOnc visit appropriate? Yes    Total time of this visit, including time spent face to face with patient and/or via video/audio, and also in preparing for today's visit for MDM and documentation. (Medical Decision Making, including consideration of possible diagnoses, management options, complex medical record review, review of diagnostic tests and information, consideration and discussion of significant complications based on comorbidities, and discussion with providers involved with the care of the patient) is 45 minutes. Greater than 50% was spent face to face with the patient counseling and coordinating care.    Cristina Allen NP    Answers submitted by the patient for this visit:  Review of Systems Questionnaire (Submitted on 2/23/2024)  appetite change : No  unexpected weight change: No  mouth sores: No  visual disturbance: No  adenopathy: No

## 2024-02-24 ENCOUNTER — PATIENT MESSAGE (OUTPATIENT)
Dept: ADMINISTRATIVE | Facility: OTHER | Age: 39
End: 2024-02-24
Payer: COMMERCIAL

## 2024-02-26 ENCOUNTER — PATIENT MESSAGE (OUTPATIENT)
Dept: HEMATOLOGY/ONCOLOGY | Facility: CLINIC | Age: 39
End: 2024-02-26
Payer: COMMERCIAL

## 2024-02-26 ENCOUNTER — TELEPHONE (OUTPATIENT)
Dept: HEMATOLOGY/ONCOLOGY | Facility: CLINIC | Age: 39
End: 2024-02-26
Payer: COMMERCIAL

## 2024-02-26 ENCOUNTER — LAB VISIT (OUTPATIENT)
Dept: LAB | Facility: HOSPITAL | Age: 39
End: 2024-02-26
Attending: NURSE PRACTITIONER
Payer: COMMERCIAL

## 2024-02-26 ENCOUNTER — PATIENT MESSAGE (OUTPATIENT)
Dept: ADMINISTRATIVE | Facility: OTHER | Age: 39
End: 2024-02-26
Payer: COMMERCIAL

## 2024-02-26 DIAGNOSIS — C50.411 MALIGNANT NEOPLASM OF UPPER-OUTER QUADRANT OF RIGHT BREAST IN FEMALE, ESTROGEN RECEPTOR POSITIVE: ICD-10-CM

## 2024-02-26 DIAGNOSIS — R35.0 URINARY FREQUENCY: ICD-10-CM

## 2024-02-26 DIAGNOSIS — Z17.0 MALIGNANT NEOPLASM OF UPPER-OUTER QUADRANT OF RIGHT BREAST IN FEMALE, ESTROGEN RECEPTOR POSITIVE: ICD-10-CM

## 2024-02-26 DIAGNOSIS — R30.0 DYSURIA: Primary | ICD-10-CM

## 2024-02-26 DIAGNOSIS — R30.0 DYSURIA: ICD-10-CM

## 2024-02-26 LAB
BILIRUB UR QL STRIP: NEGATIVE
CLARITY UR REFRACT.AUTO: ABNORMAL
COLOR UR AUTO: YELLOW
GLUCOSE UR QL STRIP: NEGATIVE
HGB UR QL STRIP: NEGATIVE
KETONES UR QL STRIP: NEGATIVE
LEUKOCYTE ESTERASE UR QL STRIP: NEGATIVE
MICROSCOPIC COMMENT: NORMAL
NITRITE UR QL STRIP: NEGATIVE
PH UR STRIP: 5 [PH] (ref 5–8)
PROT UR QL STRIP: NEGATIVE
RBC #/AREA URNS AUTO: 1 /HPF (ref 0–4)
SP GR UR STRIP: 1.02 (ref 1–1.03)
URATE CRY UR QL COMP ASSIST: NORMAL
URN SPEC COLLECT METH UR: ABNORMAL
WBC #/AREA URNS AUTO: 4 /HPF (ref 0–5)

## 2024-02-26 PROCEDURE — 87086 URINE CULTURE/COLONY COUNT: CPT | Performed by: NURSE PRACTITIONER

## 2024-02-26 PROCEDURE — 81001 URINALYSIS AUTO W/SCOPE: CPT | Performed by: NURSE PRACTITIONER

## 2024-02-26 RX ORDER — NITROFURANTOIN 25; 75 MG/1; MG/1
100 CAPSULE ORAL 2 TIMES DAILY
Qty: 10 CAPSULE | Refills: 0 | Status: SHIPPED | OUTPATIENT
Start: 2024-02-26 | End: 2024-03-19 | Stop reason: ALTCHOICE

## 2024-02-28 ENCOUNTER — PATIENT MESSAGE (OUTPATIENT)
Dept: ADMINISTRATIVE | Facility: OTHER | Age: 39
End: 2024-02-28
Payer: COMMERCIAL

## 2024-02-28 LAB
BACTERIA UR CULT: NORMAL
BACTERIA UR CULT: NORMAL

## 2024-02-29 ENCOUNTER — PATIENT MESSAGE (OUTPATIENT)
Dept: ADMINISTRATIVE | Facility: OTHER | Age: 39
End: 2024-02-29
Payer: COMMERCIAL

## 2024-02-29 ENCOUNTER — PATIENT MESSAGE (OUTPATIENT)
Dept: HEMATOLOGY/ONCOLOGY | Facility: CLINIC | Age: 39
End: 2024-02-29
Payer: COMMERCIAL

## 2024-03-01 ENCOUNTER — PATIENT MESSAGE (OUTPATIENT)
Dept: ADMINISTRATIVE | Facility: OTHER | Age: 39
End: 2024-03-01
Payer: COMMERCIAL

## 2024-03-01 DIAGNOSIS — R30.0 DYSURIA: Primary | ICD-10-CM

## 2024-03-01 RX ORDER — PHENAZOPYRIDINE HYDROCHLORIDE 100 MG/1
100 TABLET, FILM COATED ORAL 3 TIMES DAILY PRN
Qty: 30 TABLET | Refills: 1 | Status: SHIPPED | OUTPATIENT
Start: 2024-03-01

## 2024-03-02 ENCOUNTER — PATIENT MESSAGE (OUTPATIENT)
Dept: ADMINISTRATIVE | Facility: OTHER | Age: 39
End: 2024-03-02
Payer: COMMERCIAL

## 2024-03-03 ENCOUNTER — PATIENT MESSAGE (OUTPATIENT)
Dept: ADMINISTRATIVE | Facility: OTHER | Age: 39
End: 2024-03-03
Payer: COMMERCIAL

## 2024-03-05 ENCOUNTER — LAB VISIT (OUTPATIENT)
Dept: LAB | Facility: HOSPITAL | Age: 39
End: 2024-03-05
Attending: STUDENT IN AN ORGANIZED HEALTH CARE EDUCATION/TRAINING PROGRAM
Payer: COMMERCIAL

## 2024-03-05 ENCOUNTER — PATIENT MESSAGE (OUTPATIENT)
Dept: ADMINISTRATIVE | Facility: OTHER | Age: 39
End: 2024-03-05
Payer: COMMERCIAL

## 2024-03-05 DIAGNOSIS — C50.411 MALIGNANT NEOPLASM OF UPPER-OUTER QUADRANT OF RIGHT BREAST IN FEMALE, ESTROGEN RECEPTOR POSITIVE: ICD-10-CM

## 2024-03-05 DIAGNOSIS — Z17.0 MALIGNANT NEOPLASM OF UPPER-OUTER QUADRANT OF RIGHT BREAST IN FEMALE, ESTROGEN RECEPTOR POSITIVE: ICD-10-CM

## 2024-03-05 LAB
ALBUMIN SERPL BCP-MCNC: 3.5 G/DL (ref 3.5–5.2)
ALP SERPL-CCNC: 89 U/L (ref 55–135)
ALT SERPL W/O P-5'-P-CCNC: 21 U/L (ref 10–44)
ANION GAP SERPL CALC-SCNC: 8 MMOL/L (ref 8–16)
AST SERPL-CCNC: 16 U/L (ref 10–40)
BASOPHILS # BLD AUTO: 0.04 K/UL (ref 0–0.2)
BASOPHILS NFR BLD: 0.6 % (ref 0–1.9)
BILIRUB SERPL-MCNC: 0.2 MG/DL (ref 0.1–1)
BUN SERPL-MCNC: 12 MG/DL (ref 6–20)
CALCIUM SERPL-MCNC: 9.1 MG/DL (ref 8.7–10.5)
CHLORIDE SERPL-SCNC: 108 MMOL/L (ref 95–110)
CO2 SERPL-SCNC: 24 MMOL/L (ref 23–29)
CREAT SERPL-MCNC: 0.8 MG/DL (ref 0.5–1.4)
DIFFERENTIAL METHOD BLD: ABNORMAL
EOSINOPHIL # BLD AUTO: 0 K/UL (ref 0–0.5)
EOSINOPHIL NFR BLD: 0.3 % (ref 0–8)
ERYTHROCYTE [DISTWIDTH] IN BLOOD BY AUTOMATED COUNT: 13.3 % (ref 11.5–14.5)
EST. GFR  (NO RACE VARIABLE): >60 ML/MIN/1.73 M^2
GLUCOSE SERPL-MCNC: 98 MG/DL (ref 70–110)
HCG INTACT+B SERPL-ACNC: <2.4 MIU/ML
HCT VFR BLD AUTO: 37.1 % (ref 37–48.5)
HGB BLD-MCNC: 11.9 G/DL (ref 12–16)
IMM GRANULOCYTES # BLD AUTO: 0.03 K/UL (ref 0–0.04)
IMM GRANULOCYTES NFR BLD AUTO: 0.4 % (ref 0–0.5)
LYMPHOCYTES # BLD AUTO: 2.4 K/UL (ref 1–4.8)
LYMPHOCYTES NFR BLD: 35 % (ref 18–48)
MCH RBC QN AUTO: 28.7 PG (ref 27–31)
MCHC RBC AUTO-ENTMCNC: 32.1 G/DL (ref 32–36)
MCV RBC AUTO: 90 FL (ref 82–98)
MONOCYTES # BLD AUTO: 1 K/UL (ref 0.3–1)
MONOCYTES NFR BLD: 14.3 % (ref 4–15)
NEUTROPHILS # BLD AUTO: 3.4 K/UL (ref 1.8–7.7)
NEUTROPHILS NFR BLD: 49.4 % (ref 38–73)
NRBC BLD-RTO: 0 /100 WBC
PLATELET # BLD AUTO: 259 K/UL (ref 150–450)
PMV BLD AUTO: 9.5 FL (ref 9.2–12.9)
POTASSIUM SERPL-SCNC: 4 MMOL/L (ref 3.5–5.1)
PROT SERPL-MCNC: 6.8 G/DL (ref 6–8.4)
RBC # BLD AUTO: 4.14 M/UL (ref 4–5.4)
SODIUM SERPL-SCNC: 140 MMOL/L (ref 136–145)
WBC # BLD AUTO: 6.92 K/UL (ref 3.9–12.7)

## 2024-03-05 PROCEDURE — 80053 COMPREHEN METABOLIC PANEL: CPT | Performed by: STUDENT IN AN ORGANIZED HEALTH CARE EDUCATION/TRAINING PROGRAM

## 2024-03-05 PROCEDURE — 85025 COMPLETE CBC W/AUTO DIFF WBC: CPT | Performed by: STUDENT IN AN ORGANIZED HEALTH CARE EDUCATION/TRAINING PROGRAM

## 2024-03-05 PROCEDURE — 36415 COLL VENOUS BLD VENIPUNCTURE: CPT | Mod: PN | Performed by: STUDENT IN AN ORGANIZED HEALTH CARE EDUCATION/TRAINING PROGRAM

## 2024-03-05 PROCEDURE — 84702 CHORIONIC GONADOTROPIN TEST: CPT | Performed by: STUDENT IN AN ORGANIZED HEALTH CARE EDUCATION/TRAINING PROGRAM

## 2024-03-06 ENCOUNTER — INFUSION (OUTPATIENT)
Dept: INFUSION THERAPY | Facility: HOSPITAL | Age: 39
End: 2024-03-06
Payer: COMMERCIAL

## 2024-03-06 ENCOUNTER — OFFICE VISIT (OUTPATIENT)
Dept: HEMATOLOGY/ONCOLOGY | Facility: CLINIC | Age: 39
End: 2024-03-06
Payer: COMMERCIAL

## 2024-03-06 ENCOUNTER — TELEPHONE (OUTPATIENT)
Dept: HEMATOLOGY/ONCOLOGY | Facility: CLINIC | Age: 39
End: 2024-03-06
Payer: COMMERCIAL

## 2024-03-06 VITALS
DIASTOLIC BLOOD PRESSURE: 84 MMHG | OXYGEN SATURATION: 97 % | WEIGHT: 213.63 LBS | SYSTOLIC BLOOD PRESSURE: 131 MMHG | HEART RATE: 83 BPM | TEMPERATURE: 98 F | HEIGHT: 64 IN | RESPIRATION RATE: 18 BRPM | BODY MASS INDEX: 36.47 KG/M2

## 2024-03-06 VITALS
BODY MASS INDEX: 36.47 KG/M2 | TEMPERATURE: 98 F | HEART RATE: 83 BPM | RESPIRATION RATE: 17 BRPM | OXYGEN SATURATION: 97 % | DIASTOLIC BLOOD PRESSURE: 74 MMHG | HEIGHT: 64 IN | WEIGHT: 213.63 LBS | SYSTOLIC BLOOD PRESSURE: 135 MMHG

## 2024-03-06 DIAGNOSIS — D64.9 ANEMIA, UNSPECIFIED TYPE: ICD-10-CM

## 2024-03-06 DIAGNOSIS — K52.1 CHEMOTHERAPY INDUCED DIARRHEA: ICD-10-CM

## 2024-03-06 DIAGNOSIS — C50.411 MALIGNANT NEOPLASM OF UPPER-OUTER QUADRANT OF RIGHT BREAST IN FEMALE, ESTROGEN RECEPTOR POSITIVE: Primary | ICD-10-CM

## 2024-03-06 DIAGNOSIS — Z17.0 MALIGNANT NEOPLASM OF UPPER-OUTER QUADRANT OF RIGHT BREAST IN FEMALE, ESTROGEN RECEPTOR POSITIVE: Primary | ICD-10-CM

## 2024-03-06 DIAGNOSIS — R49.0 HOARSENESS: ICD-10-CM

## 2024-03-06 DIAGNOSIS — B37.0 THRUSH: ICD-10-CM

## 2024-03-06 DIAGNOSIS — T45.1X5A CHEMOTHERAPY INDUCED DIARRHEA: ICD-10-CM

## 2024-03-06 PROCEDURE — 3075F SYST BP GE 130 - 139MM HG: CPT | Mod: CPTII,S$GLB,, | Performed by: STUDENT IN AN ORGANIZED HEALTH CARE EDUCATION/TRAINING PROGRAM

## 2024-03-06 PROCEDURE — 25000003 PHARM REV CODE 250: Performed by: STUDENT IN AN ORGANIZED HEALTH CARE EDUCATION/TRAINING PROGRAM

## 2024-03-06 PROCEDURE — 96367 TX/PROPH/DG ADDL SEQ IV INF: CPT

## 2024-03-06 PROCEDURE — 96417 CHEMO IV INFUS EACH ADDL SEQ: CPT

## 2024-03-06 PROCEDURE — 96375 TX/PRO/DX INJ NEW DRUG ADDON: CPT

## 2024-03-06 PROCEDURE — 96413 CHEMO IV INFUSION 1 HR: CPT

## 2024-03-06 PROCEDURE — 63600175 PHARM REV CODE 636 W HCPCS: Mod: JZ,JG | Performed by: STUDENT IN AN ORGANIZED HEALTH CARE EDUCATION/TRAINING PROGRAM

## 2024-03-06 PROCEDURE — 99999 PR PBB SHADOW E&M-EST. PATIENT-LVL IV: CPT | Mod: PBBFAC,,, | Performed by: STUDENT IN AN ORGANIZED HEALTH CARE EDUCATION/TRAINING PROGRAM

## 2024-03-06 PROCEDURE — 3078F DIAST BP <80 MM HG: CPT | Mod: CPTII,S$GLB,, | Performed by: STUDENT IN AN ORGANIZED HEALTH CARE EDUCATION/TRAINING PROGRAM

## 2024-03-06 PROCEDURE — 99215 OFFICE O/P EST HI 40 MIN: CPT | Mod: S$GLB,,, | Performed by: STUDENT IN AN ORGANIZED HEALTH CARE EDUCATION/TRAINING PROGRAM

## 2024-03-06 PROCEDURE — 3008F BODY MASS INDEX DOCD: CPT | Mod: CPTII,S$GLB,, | Performed by: STUDENT IN AN ORGANIZED HEALTH CARE EDUCATION/TRAINING PROGRAM

## 2024-03-06 RX ORDER — SODIUM CHLORIDE 0.9 % (FLUSH) 0.9 %
10 SYRINGE (ML) INJECTION
Status: DISCONTINUED | OUTPATIENT
Start: 2024-03-06 | End: 2024-03-06 | Stop reason: HOSPADM

## 2024-03-06 RX ORDER — PROCHLORPERAZINE EDISYLATE 5 MG/ML
10 INJECTION INTRAMUSCULAR; INTRAVENOUS ONCE AS NEEDED
Status: CANCELLED | OUTPATIENT
Start: 2024-03-07

## 2024-03-06 RX ORDER — SODIUM CHLORIDE 0.9 % (FLUSH) 0.9 %
10 SYRINGE (ML) INJECTION
Status: CANCELLED | OUTPATIENT
Start: 2024-03-07

## 2024-03-06 RX ORDER — HEPARIN 100 UNIT/ML
500 SYRINGE INTRAVENOUS
Status: CANCELLED | OUTPATIENT
Start: 2024-03-07

## 2024-03-06 RX ORDER — EPINEPHRINE 0.3 MG/.3ML
0.3 INJECTION SUBCUTANEOUS ONCE AS NEEDED
Status: CANCELLED | OUTPATIENT
Start: 2024-03-07

## 2024-03-06 RX ORDER — DIPHENHYDRAMINE HYDROCHLORIDE 50 MG/ML
50 INJECTION INTRAMUSCULAR; INTRAVENOUS ONCE AS NEEDED
Status: CANCELLED | OUTPATIENT
Start: 2024-03-07

## 2024-03-06 RX ORDER — MEPERIDINE HYDROCHLORIDE 50 MG/ML
25 INJECTION INTRAMUSCULAR; INTRAVENOUS; SUBCUTANEOUS ONCE AS NEEDED
Status: DISCONTINUED | OUTPATIENT
Start: 2024-03-06 | End: 2024-03-06 | Stop reason: HOSPADM

## 2024-03-06 RX ORDER — PROCHLORPERAZINE EDISYLATE 5 MG/ML
10 INJECTION INTRAMUSCULAR; INTRAVENOUS ONCE AS NEEDED
Status: DISCONTINUED | OUTPATIENT
Start: 2024-03-06 | End: 2024-03-06 | Stop reason: HOSPADM

## 2024-03-06 RX ORDER — FAMOTIDINE 10 MG/ML
20 INJECTION INTRAVENOUS
Status: COMPLETED | OUTPATIENT
Start: 2024-03-06 | End: 2024-03-06

## 2024-03-06 RX ORDER — DIPHENHYDRAMINE HYDROCHLORIDE 50 MG/ML
50 INJECTION INTRAMUSCULAR; INTRAVENOUS ONCE AS NEEDED
Status: DISCONTINUED | OUTPATIENT
Start: 2024-03-06 | End: 2024-03-06 | Stop reason: HOSPADM

## 2024-03-06 RX ORDER — HEPARIN 100 UNIT/ML
500 SYRINGE INTRAVENOUS
Status: DISCONTINUED | OUTPATIENT
Start: 2024-03-06 | End: 2024-03-06 | Stop reason: HOSPADM

## 2024-03-06 RX ORDER — EPINEPHRINE 0.3 MG/.3ML
0.3 INJECTION SUBCUTANEOUS ONCE AS NEEDED
Status: DISCONTINUED | OUTPATIENT
Start: 2024-03-06 | End: 2024-03-06 | Stop reason: HOSPADM

## 2024-03-06 RX ORDER — FAMOTIDINE 10 MG/ML
20 INJECTION INTRAVENOUS
Status: CANCELLED
Start: 2024-03-07 | End: 2024-03-07

## 2024-03-06 RX ORDER — MEPERIDINE HYDROCHLORIDE 50 MG/ML
25 INJECTION INTRAMUSCULAR; INTRAVENOUS; SUBCUTANEOUS ONCE AS NEEDED
Status: CANCELLED | OUTPATIENT
Start: 2024-03-07

## 2024-03-06 RX ADMIN — FAMOTIDINE 20 MG: 10 INJECTION INTRAVENOUS at 11:03

## 2024-03-06 RX ADMIN — CARBOPLATIN 900 MG: 10 INJECTION, SOLUTION INTRAVENOUS at 04:03

## 2024-03-06 RX ADMIN — PERTUZUMAB 420 MG: 30 INJECTION, SOLUTION, CONCENTRATE INTRAVENOUS at 01:03

## 2024-03-06 RX ADMIN — DOCETAXEL ANHYDROUS 154 MG: 10 INJECTION, SOLUTION INTRAVENOUS at 03:03

## 2024-03-06 RX ADMIN — DIPHENHYDRAMINE HYDROCHLORIDE 50 MG: 50 INJECTION, SOLUTION INTRAMUSCULAR; INTRAVENOUS at 12:03

## 2024-03-06 RX ADMIN — DEXAMETHASONE SODIUM PHOSPHATE 0.25 MG: 4 INJECTION, SOLUTION INTRA-ARTICULAR; INTRALESIONAL; INTRAMUSCULAR; INTRAVENOUS; SOFT TISSUE at 11:03

## 2024-03-06 RX ADMIN — FOSAPREPITANT 150 MG: 150 INJECTION, POWDER, LYOPHILIZED, FOR SOLUTION INTRAVENOUS at 12:03

## 2024-03-06 RX ADMIN — TRAZTUZUMAB-ANNS 560 MG: KIT at 02:03

## 2024-03-06 RX ADMIN — HEPARIN 500 UNITS: 100 SYRINGE at 04:03

## 2024-03-06 NOTE — NURSING
PT tolerated TCHP infusion well. No adverse reaction noted. Pt education reinforced on side effects, what to expect and when to contact the patient. Pt verbalized understanding. PAC flushed with heparin and de-accessed per protocol.

## 2024-03-06 NOTE — PROGRESS NOTES
[facility]/ [facility]      Chief Complaint:   HR+/Her2+ breast cancer      Cancer Staging   Malignant neoplasm of upper-outer quadrant of right breast in female, estrogen receptor positive  Staging form: Breast, AJCC 8th Edition  - Clinical stage from 2024: Stage IB (cT2, cN0, cM0, G3, ER+, [place]+, HER2+) - Signed by Kassie Blakely MD on 2024    HPI:  Madhu Dela Cruz is a 37yo woman who presents today for evaluation of newly diagnosed breast cancer. Oncologic history below:     -2023 Screening mammogram with no evidence of malignancy   -2024 Diagnostic mammogram (palpable right breast mass) with  2.2 x 1.8 x 2.1 cm right breast mass   -2024 Needle biopsy of right breast with IDC grade 3, 8mm in greatest dimension, %, [place] 40%, HER2 3+, Ki-67 >90%.  -24 Breast MRI with right breast 2.4 cm x 2.1 cm x 2.3 cm mass at the 3 o'clock position. A smaller benign appearing right breast mass. Normal axillary lymph nodes.   -started neoadjuvant TCHP 2024    Gyn History:   Menarche: Age 10   Menopause: Pre    Age at first pregnancy: 15  : 2nd child, not first  HRT: Was on hormonal birth control, stopped   Does not desire fertility preservation.   Family History: Maternal aunt, diagnosed later in life. No ovarian cancer.     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      Interval history:   Madhu returns today prior to C2 of TCHP. She reports that overall C1 went fairly well. She had rigors w herceptin/perjeta that resolved w demerol and zofran. Her first cycle was complicated by recent covid infection. She notes some hoarseness and fatigue that have since resolved. She took paxlovid and reports no significant symptoms. She has noted diarrhea that is stable w imodium and lomotil use, going approx 4 times daily. Feels like she is keeping up with po fluid intake.  "Denies significant difficulty with n/v, mouth sores or neuropathy.      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    duke's soln (benadryl 30 mL, mylanta 30 mL, LIDOcaine 30 mL, nystatin 30 mL) 120mL 10 mLs, Oral, 4 times daily, Swish and swallow    ergocalciferol, vitamin D2, (VITAMIN D ORAL) No dose, route, or frequency recorded.    LACTOBACILLUS ACIDOPHILUS ORAL No dose, route, or frequency recorded.    LIDOcaine-prilocaine (EMLA) cream Topical (Top), As needed (PRN)    nitrofurantoin, macrocrystal-monohydrate, (MACROBID) 100 MG capsule 100 mg, Oral, 2 times daily    OLANZapine (ZYPREXA) 5 MG tablet Take 1 tablet by mouth nightly on days 1-4 of each chemotherapy cycle.    omega 3-dha-epa-fish oil 1,200 (144-216) mg Cap No dose, route, or frequency recorded.    ondansetron (ZOFRAN) 4 MG tablet No dose, route, or frequency recorded.    pantoprazole (PROTONIX) 40 MG tablet No dose, route, or frequency recorded.    phenazopyridine (PYRIDIUM) 100 mg, Oral, 3 times daily PRN    prochlorperazine (COMPAZINE) 10 mg, Oral, Every 6 hours PRN    semaglutide (OZEMPIC SUBQ) No dose, route, or frequency recorded.       Review of Systems:   Answers submitted by the patient for this visit:  Review of Systems Questionnaire (Submitted on 3/5/2024)  appetite change : No  unexpected weight change: No  mouth sores: No  visual disturbance: No  cough: No  shortness of breath: No  chest pain: No  abdominal pain: No  diarrhea: Yes  frequency: No  back pain: No  rash: No  headaches: No  adenopathy: No  nervous/ anxious: No      PHYSICAL EXAM:  /74   Pulse 83   Temp 98 °F (36.7 °C) (Oral)   Resp 17   Ht 5' 4" (1.626 m)  "  Wt 96.9 kg (213 lb 10 oz)   SpO2 97%   BMI 36.67 kg/m²     ECOG 0  General: well appearing, in no apparent distress  HEENT: Normocephalic, EOMI, anicteric sclerae, MMM  Neck: supple, without cervical or supraclavicular lymphadenopathy.  Heart: regular rate and rhythm, normal S1 and S2, no murmurs, gallops or rubs.  Lungs: Clear to auscultation bilaterally, no increased wob  Breast: difficult to appreciate Rt breast mass at 3oclock  Abdomen: Soft, nontender, nondistended   Extremities: No LE edema or joint effusion  Skin: warm, well-perfused, no rash  Neurologic: Alert and oriented x 4, normal speech and gait   Psychiatric: Conversing appropriately with providers throughout today's encounter.    Pertinent Labs & Imaging:  Reviewed recent labs, imaging and pathology.     Assessment & Plan:  Ms. Dela Cruz is a cuco 37yo woman with recently diagnosed cT2N0 HR+/Her2+ breast cancer who presents today for follow up.     Given that her tumor is as least T2N0, and that she is otherwise healthy, have proceeded with neoadjuvant treatment with TCHP. Previously reviewed dosing, schedule and potential side effects. She is now s/p C1 and tolerating fairly well thus far.    #HR+/Her2+ breast cancer:  --labs reviewed and ok to proceed with C2 as scheduled  --moved premedications before herceptin/perjeta and slowed rate given rigors w cycle 1   -- BRCA testing negative (no pathologic mutations, had to VUS in met and pten genes)  --s/p referral to integrative onc  --RTC in 3 weeks as schedued       #Chemo-induced diarrhea: stable on imodium and lomotil  --encouraged po intake  --cont imodium and lomotil prn      #Thrush, hoarseness: resolved  --has duke's soln if needed      #Normocytic anemia: likely 2/2 chemo, pt reports recently finishing heavy menstrual cycle  --will cont to monitor     All questions were answered to her apparent satisfaction. Will see her back in 3 weeks or sooner should the need arise.    Route Chart for  Scheduling    Med Onc Chart Routing      Follow up with physician 6 weeks.   Follow up with GILBERTO 3 weeks. scheduled   Infusion scheduling note   cont q3 week infusions as scheduled   Injection scheduling note cont day 2 injection as scheduled   Labs CBC, CMP and B HCG   Scheduling:  Preferred lab:  Lab interval: every 3 weeks     Imaging None      Pharmacy appointment No pharmacy appointment needed      Other referrals no referral to Oncology Primary Care needed -  no Massage appointment needed    No additional referrals needed               Treatment Plan Information   OP BREAST TCHP (pertuzumab trastuzumab DOCEtaxel CARBOplatin) Q3W   Kassie Blakely MD   Upcoming Treatment Dates - OP BREAST TCHP (pertuzumab trastuzumab DOCEtaxel CARBOplatin) Q3W    3/8/2024       Growth Factor       pegfilgrastim-cbqv (UDENYCA) injection 6 mg  3/28/2024       Chemotherapy       pertuzumab (PERJETA) 420 mg in sodium chloride 0.9% 264 mL infusion       trastuzumab-anns (KANJINTI) 560 mg in sodium chloride 0.9% 250 mL chemo infusion       DOCEtaxel (TAXOTERE) 75 mg/m2 = 154 mg in sodium chloride 0.9% 257.7 mL chemo infusion       CARBOplatin (PARAPLATIN) 900 mg in sodium chloride 0.9% 340 mL chemo infusion       Supportive Care       prochlorperazine injection 10 mg       meperidine injection 25 mg       Antiemetics       fosaprepitant 150 mg in sodium chloride 0.9% 150 mL IVPB       famotidine (PF) injection 20 mg       palonosetron 0.25mg/dexAMETHasone 20mg in NS IVPB 0.25 mg 50 mL       diphenhydrAMINE (BENADRYL) 50 mg in NS 50 mL IVPB  3/29/2024       Growth Factor       pegfilgrastim-cbqv (UDENYCA) injection 6 mg  4/18/2024       Chemotherapy       pertuzumab (PERJETA) 420 mg in sodium chloride 0.9% 264 mL infusion       trastuzumab-anns (KANJINTI) 560 mg in sodium chloride 0.9% 250 mL chemo infusion       DOCEtaxel (TAXOTERE) 75 mg/m2 = 154 mg in sodium chloride 0.9% 257.7 mL chemo infusion       CARBOplatin (PARAPLATIN) 900  mg in sodium chloride 0.9% 340 mL chemo infusion       Supportive Care       prochlorperazine injection 10 mg       meperidine injection 25 mg       Antiemetics       fosaprepitant 150 mg in sodium chloride 0.9% 150 mL IVPB       famotidine (PF) injection 20 mg       palonosetron 0.25mg/dexAMETHasone 20mg in NS IVPB 0.25 mg 50 mL       diphenhydrAMINE (BENADRYL) 50 mg in NS 50 mL IVPB    Supportive Plan Information  IV FLUIDS AND ELECTROLYTES   Kassie Blakely MD   Upcoming Treatment Dates - IV FLUIDS AND ELECTROLYTES    No upcoming days in selected categories.      MDM includes  :    - Acute or chronic illness or injury that poses a threat to life or bodily function  - Review of prior external notes from unique source  - Independent review and explanation of 3+ results from unique tests  - Discussion of management and ordering 3+ unique tests  - Extensive discussion of treatment and management  - Prescription drug management  - Drug therapy requiring intensive monitoring for toxicity    Kassie Blakely MD

## 2024-03-06 NOTE — TELEPHONE ENCOUNTER
Spoke w/ pt in regards to scheduling Integrative Oncology referral placed by Dr. Vargas. Pt approved for scheduling virtual visit for Tuesday 3/19/2024 @ 11:00AM with Joanna Dubinsky, PA-C.      [place], [place] ext [phone]

## 2024-03-07 ENCOUNTER — INFUSION (OUTPATIENT)
Dept: INFUSION THERAPY | Facility: HOSPITAL | Age: 39
End: 2024-03-07
Payer: COMMERCIAL

## 2024-03-07 ENCOUNTER — TELEPHONE (OUTPATIENT)
Dept: HEMATOLOGY/ONCOLOGY | Facility: CLINIC | Age: 39
End: 2024-03-07
Payer: COMMERCIAL

## 2024-03-07 DIAGNOSIS — C50.411 MALIGNANT NEOPLASM OF UPPER-OUTER QUADRANT OF RIGHT BREAST IN FEMALE, ESTROGEN RECEPTOR POSITIVE: Primary | ICD-10-CM

## 2024-03-07 DIAGNOSIS — Z17.0 MALIGNANT NEOPLASM OF UPPER-OUTER QUADRANT OF RIGHT BREAST IN FEMALE, ESTROGEN RECEPTOR POSITIVE: Primary | ICD-10-CM

## 2024-03-07 PROCEDURE — 96372 THER/PROPH/DIAG INJ SC/IM: CPT

## 2024-03-07 PROCEDURE — 63600175 PHARM REV CODE 636 W HCPCS: Mod: JZ,JG | Performed by: STUDENT IN AN ORGANIZED HEALTH CARE EDUCATION/TRAINING PROGRAM

## 2024-03-07 RX ADMIN — PEGFILGRASTIM-CBQV 6 MG: 6 INJECTION, SOLUTION SUBCUTANEOUS at 01:03

## 2024-03-07 NOTE — TELEPHONE ENCOUNTER
----- Message from Talya Anthony sent at 3/7/2024 12:41 PM CST -----  Regarding: Pt running late to appt:  Contact: Pt  [phone]          Provider: Kassie Blakely MD     Caller:  Madhu Crowleyt time: 12:30     ETA: 1     Asking, will still be seen? Please call [phone]  please call if rescheduling is needed running late due to traffic

## 2024-03-07 NOTE — NURSING
Pt here for Udenyca injection. Administered injection in back of left arm. No questions or concerns. Pt ambulated out of unit unassisted.

## 2024-03-08 ENCOUNTER — PATIENT MESSAGE (OUTPATIENT)
Dept: ADMINISTRATIVE | Facility: OTHER | Age: 39
End: 2024-03-08
Payer: COMMERCIAL

## 2024-03-11 ENCOUNTER — PATIENT MESSAGE (OUTPATIENT)
Dept: ADMINISTRATIVE | Facility: OTHER | Age: 39
End: 2024-03-11
Payer: COMMERCIAL

## 2024-03-12 ENCOUNTER — PATIENT MESSAGE (OUTPATIENT)
Dept: HEMATOLOGY/ONCOLOGY | Facility: CLINIC | Age: 39
End: 2024-03-12
Payer: COMMERCIAL

## 2024-03-12 ENCOUNTER — TELEPHONE (OUTPATIENT)
Dept: HEMATOLOGY/ONCOLOGY | Facility: CLINIC | Age: 39
End: 2024-03-12
Payer: COMMERCIAL

## 2024-03-12 ENCOUNTER — PATIENT MESSAGE (OUTPATIENT)
Dept: ADMINISTRATIVE | Facility: OTHER | Age: 39
End: 2024-03-12
Payer: COMMERCIAL

## 2024-03-13 ENCOUNTER — PATIENT MESSAGE (OUTPATIENT)
Dept: ADMINISTRATIVE | Facility: OTHER | Age: 39
End: 2024-03-13
Payer: COMMERCIAL

## 2024-03-13 ENCOUNTER — PATIENT MESSAGE (OUTPATIENT)
Dept: HEMATOLOGY/ONCOLOGY | Facility: CLINIC | Age: 39
End: 2024-03-13
Payer: COMMERCIAL

## 2024-03-15 NOTE — PROGRESS NOTES
[facility]/ [facility]      Chief Complaint:   HR+/Her2+ breast cancer      Cancer Staging   Malignant neoplasm of upper-outer quadrant of right breast in female, estrogen receptor positive  Staging form: Breast, AJCC 8th Edition  - Clinical stage from 2024: Stage IB (cT2, cN0, cM0, G3, ER+, WA+, HER2+) - Signed by Kassie Blakely MD on 2024    HPI:  Madhu Dela Cruz is a 37yo woman who presents today for evaluation of newly diagnosed breast cancer. Oncologic history below:     -2023 Screening mammogram with no evidence of malignancy   -2024 Diagnostic mammogram (palpable right breast mass) with  2.2 x 1.8 x 2.1 cm right breast mass   -2024 Needle biopsy of right breast with IDC grade 3, 8mm in greatest dimension, %, WA 40%, HER2 3+, Ki-67 >90%.  -24 Breast MRI with right breast 2.4 cm x 2.1 cm x 2.3 cm mass at the 3 o'clock position. A smaller benign appearing right breast mass. Normal axillary lymph nodes.   -started neoadjuvant TCHP 2024    Gyn History:   Menarche: Age 10   Menopause: Pre    Age at first pregnancy: 15  : 2nd child, not first  HRT: Was on hormonal birth control, stopped   Does not desire fertility preservation.   Family History: Maternal aunt, diagnosed later in life. No ovarian cancer.     She lives in [facility] with her , they run a photo lubin business.      Interval history:   Notes she is more fatigued and achyness this week. C3 of TCHP. The first 2 weeks post chemotherapy notes her bowels are watery.   Appetite is good. She is keeping up with her hydration. Controlled nausea.   She had rigors w herceptin/perjeta that resolved w demerol and zofran. She has noted diarrhea that is stable w imodium and lomotil use, going approx 4 times daily. Denies significant difficulty with n/v, mouth sores or neuropathy. Occasional peripheral neuropathy.  "      Patient Active Problem List   Diagnosis    Malignant neoplasm of upper-outer quadrant of right breast in female, estrogen receptor positive    Chemotherapy induced diarrhea    Dehydration       Current Outpatient Medications   Medication Instructions    dexAMETHasone (DECADRON) 4 MG Tab Take 2 tablets by mouth daily on days 2-4 of each chemotherapy cycle.    duke's soln (benadryl 30 mL, mylanta 30 mL, LIDOcaine 30 mL, nystatin 30 mL) 120mL 10 mLs, Oral, 4 times daily, Swish and swallow    ergocalciferol, vitamin D2, (VITAMIN D ORAL) No dose, route, or frequency recorded.    LACTOBACILLUS ACIDOPHILUS ORAL No dose, route, or frequency recorded.    LIDOcaine-prilocaine (EMLA) cream Topical (Top), As needed (PRN)    nitrofurantoin, macrocrystal-monohydrate, (MACROBID) 100 MG capsule 100 mg, Oral, 2 times daily    OLANZapine (ZYPREXA) 5 MG tablet Take 1 tablet by mouth nightly on days 1-4 of each chemotherapy cycle.    omega 3-dha-epa-fish oil 1,200 (144-216) mg Cap No dose, route, or frequency recorded.    ondansetron (ZOFRAN) 4 MG tablet No dose, route, or frequency recorded.    phenazopyridine (PYRIDIUM) 100 mg, Oral, 3 times daily PRN    prochlorperazine (COMPAZINE) 10 mg, Oral, Every 6 hours PRN       Review of Systems:   Answers submitted by the patient for this visit:  Review of Systems Questionnaire         PHYSICAL EXAM:  /72   Pulse 92   Resp 20   Ht 5' 4" (1.626 m)   Wt 98.8 kg (217 lb 11.3 oz)   SpO2 98%   BMI 37.37 kg/m²     ECOG 0  General: well appearing, in no apparent distress  HEENT: Normocephalic, EOMI, anicteric sclerae, MMM  Neck: supple, without cervical or supraclavicular lymphadenopathy.  Heart: regular rate and rhythm, normal S1 and S2, no murmurs, gallops or rubs.  Lungs: Clear to auscultation bilaterally, no increased wob  Breast: difficult to appreciate Rt breast mass at 3oclock  Abdomen: Soft, nontender, nondistended   Extremities: No LE edema or joint effusion  Skin: warm, " well-perfused, no rash  Neurologic: Alert and oriented x 4, normal speech and gait   Psychiatric: Conversing appropriately with providers throughout today's encounter.    Pertinent Labs & Imaging:  Reviewed recent labs, imaging and pathology.     Assessment & Plan:  Ms. Dela Cruz is a cuco 39yo woman with recently diagnosed cT2N0 HR+/Her2+ breast cancer who presents today for follow up.     Given that her tumor is as least T2N0, and that she is otherwise healthy, have proceeded with neoadjuvant treatment with TCHP. Previously reviewed dosing, schedule and potential side effects. She is now s/p C1 and tolerating fairly well thus far.    #HR+/Her2+ breast cancer:  --labs reviewed and ok to proceed with C3 as scheduled  --moved premedications before herceptin/perjeta and slowed rate given rigors w cycle 1   -- BRCA testing negative (no pathologic mutations, had to VUS in met and pten genes)  --s/p referral to integrative onc  --RTC in 3 weeks as schedued       #Chemo-induced diarrhea: stable on imodium and lomotil  --encouraged po intake  --cont imodium and lomotil prn  -- discussed psyllium bulking agent       #Thrush, hoarseness: resolved  --has duke's soln if needed      #Normocytic anemia: likely 2/2 chemo, pt reports recently finishing heavy menstrual cycle  --will cont to monitor     All questions were answered to her apparent satisfaction. Will see her back in 3 weeks or sooner should the need arise.    Return to clinic in 3 weeks with MD appointment and labs.     Patient is in agreement with the proposed treatment plan. All questions were answered to the patient's satisfaction. Patient knows to call clinic for any new or worsening symptoms and if anything is needed before the next clinic visit.          Paulina Rivera, FNP-C  Hematology & Medical Oncology   [address] [place] [address]  ph. [phone]  Fax. [phone]    Collaborating physician, Dr. Blakely.    Approximately 15 minutes  were spent face-to-face with the patient.  Approximately 25 minutes in total were spent on this encounter, which includes face-to-face time and non-face-to-face time preparing to see the patient (e.g., review of tests), obtaining and/or reviewing separately obtained history, documenting clinical information in the electronic or other health record, independently interpreting results (not separately reported) and communicating results to the patient/family/caregiver, or care coordination (not separately reported).       Route Chart for Scheduling    Med Onc Chart Routing      Follow up with physician 3 weeks. scheduled appropraitely   Follow up with GILBERTO    Infusion scheduling note    Injection scheduling note    Labs    Imaging    Pharmacy appointment    Other referrals                  Treatment Plan Information   OP BREAST TCHP (pertuzumab trastuzumab DOCEtaxel CARBOplatin) Q3W   Kassie Blakely MD   Upcoming Treatment Dates - OP BREAST TCHP (pertuzumab trastuzumab DOCEtaxel CARBOplatin) Q3W    3/28/2024       Chemotherapy       pertuzumab (PERJETA) 420 mg in sodium chloride 0.9% 264 mL infusion       trastuzumab-anns (KANJINTI) 560 mg in sodium chloride 0.9% 250 mL chemo infusion       DOCEtaxel (TAXOTERE) 75 mg/m2 = 154 mg in sodium chloride 0.9% 257.7 mL chemo infusion       CARBOplatin (PARAPLATIN) 900 mg in sodium chloride 0.9% 340 mL chemo infusion       Supportive Care       prochlorperazine injection 10 mg       meperidine injection 25 mg       Antiemetics       fosaprepitant 150 mg in sodium chloride 0.9% 150 mL IVPB       famotidine (PF) injection 20 mg       palonosetron 0.25mg/dexAMETHasone 20mg in NS IVPB 0.25 mg 50 mL       diphenhydrAMINE (BENADRYL) 50 mg in NS 50 mL IVPB  3/29/2024       Growth Factor       pegfilgrastim-cbqv (UDENYCA) injection 6 mg  4/18/2024       Chemotherapy       pertuzumab (PERJETA) 420 mg in sodium chloride 0.9% 264 mL infusion       trastuzumab-anns (KANJINTI) 560 mg in  sodium chloride 0.9% 250 mL chemo infusion       DOCEtaxel (TAXOTERE) 75 mg/m2 = 154 mg in sodium chloride 0.9% 257.7 mL chemo infusion       CARBOplatin (PARAPLATIN) 900 mg in sodium chloride 0.9% 340 mL chemo infusion       Supportive Care       prochlorperazine injection 10 mg       meperidine injection 25 mg       Antiemetics       fosaprepitant 150 mg in sodium chloride 0.9% 150 mL IVPB       famotidine (PF) injection 20 mg       palonosetron 0.25mg/dexAMETHasone 20mg in NS IVPB 0.25 mg 50 mL       diphenhydrAMINE (BENADRYL) 50 mg in NS 50 mL IVPB  4/19/2024       Growth Factor       pegfilgrastim-cbqv (UDENYCA) injection 6 mg    Supportive Plan Information  IV FLUIDS AND ELECTROLYTES   Kassie Blakely MD   Upcoming Treatment Dates - IV FLUIDS AND ELECTROLYTES    No upcoming days in selected categories.      MDM includes  :    - Acute or chronic illness or injury that poses a threat to life or bodily function  - Review of prior external notes from unique source  - Independent review and explanation of 3+ results from unique tests  - Discussion of management and ordering 3+ unique tests  - Extensive discussion of treatment and management  - Prescription drug management  - Drug therapy requiring intensive monitoring for toxicity

## 2024-03-18 ENCOUNTER — PATIENT MESSAGE (OUTPATIENT)
Dept: ADMINISTRATIVE | Facility: OTHER | Age: 39
End: 2024-03-18
Payer: COMMERCIAL

## 2024-03-19 ENCOUNTER — TELEPHONE (OUTPATIENT)
Dept: HEMATOLOGY/ONCOLOGY | Facility: CLINIC | Age: 39
End: 2024-03-19

## 2024-03-19 ENCOUNTER — PATIENT MESSAGE (OUTPATIENT)
Dept: ADMINISTRATIVE | Facility: OTHER | Age: 39
End: 2024-03-19
Payer: COMMERCIAL

## 2024-03-19 ENCOUNTER — OFFICE VISIT (OUTPATIENT)
Dept: HEMATOLOGY/ONCOLOGY | Facility: CLINIC | Age: 39
End: 2024-03-19
Payer: COMMERCIAL

## 2024-03-19 DIAGNOSIS — C50.919 BREAST CANCER: ICD-10-CM

## 2024-03-19 PROCEDURE — 1159F MED LIST DOCD IN RCRD: CPT | Mod: CPTII,95,, | Performed by: PHYSICIAN ASSISTANT

## 2024-03-19 PROCEDURE — 1160F RVW MEDS BY RX/DR IN RCRD: CPT | Mod: CPTII,95,, | Performed by: PHYSICIAN ASSISTANT

## 2024-03-19 PROCEDURE — 99214 OFFICE O/P EST MOD 30 MIN: CPT | Mod: 95,,, | Performed by: PHYSICIAN ASSISTANT

## 2024-03-19 NOTE — TELEPHONE ENCOUNTER
Spoke w/ pt in regards to scheduling can psych and nutrition referral placed by Joanna Dubinsky, PA-C. Pt approved to schedule nutrition appt virtual with Idalmis Carson RD for Wednesday 4/17/24 @ 1:30PM. Pt also approved to schedule can psych appt virtual with Dr. Javier Cohn for Friday 3/22/24 @ 9:30AM.       [place], [place] ext [phone]

## 2024-03-19 NOTE — PROGRESS NOTES
The patient location is: [place]    Visit type: audiovisual    Face to Face time with patient: 20  35 minutes of total time spent on the encounter, which includes face to face time and non-face to face time preparing to see the patient (eg, review of tests), Obtaining and/or reviewing separately obtained history, Documenting clinical information in the electronic or other health record, Independently interpreting results (not separately reported) and communicating results to the patient/family/caregiver, or Care coordination (not separately reported).         Each patient to whom he or she provides medical services by telemedicine is:  (1) informed of the relationship between the physician and patient and the respective role of any other health care provider with respect to management of the patient; and (2) notified that he or she may decline to receive medical services by telemedicine and may withdraw from such care at any time.    Notes:        Integrative Health and Medicine Initial Visit      Chief Complaint:  I want to promote my overall well-being through cancer.    HPI: Madhu Dela Cruz is a 39 y/o FEMALE with history of breast cancer, presenting today to Integrative Oncology referred by Dr. Blakely.  Ms Dela Cruz notes many stressors in her life right now.  She continues to work full time despite feeling fatigued from chemo.  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  Ms. Saucedo recently met with a surgeon for a second opinion as she is having a hard time debating between double or single mastectomy.  This has been on her mind and is causing considerable stress for her.  She is interested in meeting with Psychology. She sleeps in 4-5 hour increments and wakes up and sometimes has a hard time getting back to sleep.  After chemo, days 4,5, 6 are her hardest days.  She does continue to have loose stool but the urgency to go has  gotten better.  She thinks she is eating healthy, eating veggies and protein, but she is interested in Nutrition referral.  Patient states she is not physically active and was not before she got the diagnosis.  She would like to exercise and be physically active.  Her oncologist told her to exercise for 30 minutes on the days she feels well.  She states she has not been able to do this.  She feels overwhelmed.     Cancer/Stage/TNM:   Cancer Staging   Malignant neoplasm of upper-outer quadrant of right breast in female, estrogen receptor positive  Staging form: Breast, AJCC 8th Edition  - Clinical stage from 1/18/2024: Stage IB (cT2, cN0, cM0, G3, ER+, DE+, HER2+) - Signed by Kassie Blakely MD on 1/18/2024       Oncology History   Malignant neoplasm of upper-outer quadrant of right breast in female, estrogen receptor positive   1/17/2024 Initial Diagnosis    Malignant neoplasm of upper-outer quadrant of right breast in female, estrogen receptor positive     1/18/2024 Cancer Staged    Staging form: Breast, AJCC 8th Edition  - Clinical stage from 1/18/2024: Stage IB (cT2, cN0, cM0, G3, ER+, DE+, HER2+)     2/15/2024 -  Chemotherapy    Treatment Summary   Plan Name: OP BREAST TCHP (pertuzumab trastuzumab DOCEtaxel CARBOplatin) Q3W  Treatment Goal: Curative  Status: Active  Start Date: 2/15/2024  End Date: 1/16/2025 (Planned)  Provider: Kassie Blakely MD  Chemotherapy: CARBOplatin (PARAPLATIN) 900 mg in sodium chloride 0.9% 285 mL chemo infusion, 900 mg, Intravenous, Clinic/HOD 1 time, 2 of 6 cycles  Administration: 900 mg (2/15/2024), 900 mg (3/6/2024)  DOCEtaxel (TAXOTERE) 75 mg/m2 = 154 mg in sodium chloride 0.9% 300.4 mL chemo infusion, 75 mg/m2 = 154 mg, Intravenous, Clinic/HOD 1 time, 2 of 6 cycles  Administration: 154 mg (2/15/2024), 154 mg (3/6/2024)  pertuzumab (PERJETA) 840 mg in sodium chloride 0.9% 313 mL infusion, 840 mg, Intravenous, Clinic/HOD 1 time, 2 of 17 cycles  Administration: 840 mg  (2/15/2024), 420 mg (3/6/2024)  trastuzumab-anns (KANJINTI) 747 mg in sodium chloride 0.9% 320.57 mL chemo infusion, 8 mg/kg = 747 mg, Intravenous, Clinic/HOD 1 time, 2 of 17 cycles  Administration: 747 mg (2/15/2024), 560 mg (3/6/2024)           Past Medical History:   Diagnosis Date    Esophageal reflux     Migraine         Current Outpatient Medications   Medication Instructions    dexAMETHasone (DECADRON) 4 MG Tab Take 2 tablets by mouth daily on days 2-4 of each chemotherapy cycle.    duke's soln (benadryl 30 mL, mylanta 30 mL, LIDOcaine 30 mL, nystatin 30 mL) 120mL 10 mLs, Oral, 4 times daily, Swish and swallow    ergocalciferol, vitamin D2, (VITAMIN D ORAL) No dose, route, or frequency recorded.    LACTOBACILLUS ACIDOPHILUS ORAL No dose, route, or frequency recorded.    LIDOcaine-prilocaine (EMLA) cream Topical (Top), As needed (PRN)    nitrofurantoin, macrocrystal-monohydrate, (MACROBID) 100 MG capsule 100 mg, Oral, 2 times daily    OLANZapine (ZYPREXA) 5 MG tablet Take 1 tablet by mouth nightly on days 1-4 of each chemotherapy cycle.    omega 3-dha-epa-fish oil 1,200 (144-216) mg Cap No dose, route, or frequency recorded.    ondansetron (ZOFRAN) 4 MG tablet No dose, route, or frequency recorded.    phenazopyridine (PYRIDIUM) 100 mg, Oral, 3 times daily PRN    prochlorperazine (COMPAZINE) 10 mg, Oral, Every 6 hours PRN        No past surgical history on file.         7 Pillars Assessment      Sleep  How many hours of sleep per night? 6-7 hours  Do you have trouble falling asleep, staying asleep or waking up earlier than you need to? yes  Do you have daytime fatigue? no--except on days 5,6,7 after chemo  Do you need medication for sleep? no  Do you use any supplements or other interventions for sleep? no    Resilience  Rate your current level of stress- high  How do you manage stress?  Time with partner, dogs    Purpose  Do you feel you have a vision or a life purpose? Yes    Environment  Any exposures:no  known exposures    Spirituality-  not discussed this visit    Nutrition   Food allergies or sensitivities: no  Do you adhere to a particular type of diet? no  What type of diet do you follow? none  Do you have any concerns with your eating habits? no  Are you concerned with your level of alcohol intake? no    Exercise  How would you describe your physical activity level? low  Do you work at a sedentary job? yes  What do you do for physical activity? housework      Physical Exam   There were no vitals taken for this visit.   Wt Readings from Last 3 Encounters:   03/06/24 96.9 kg (213 lb 10 oz)   03/06/24 96.9 kg (213 lb 10 oz)   02/23/24 93.3 kg (205 lb 12.8 oz)     Temp Readings from Last 3 Encounters:   03/06/24 98 °F (36.7 °C)   03/06/24 98 °F (36.7 °C) (Oral)   02/23/24 98.1 °F (36.7 °C) (Oral)     BP Readings from Last 3 Encounters:   03/06/24 131/84   03/06/24 135/74   02/23/24 123/77     Pulse Readings from Last 3 Encounters:   03/06/24 83   03/06/24 83   02/23/24 (!) 116       Body mass index is There is no height or weight on file to calculate BMI.    Vitals reviewed.   Constitutional:       General: Patient is not in acute distress.     Appearance: Normal appearance.   HENT:      Head: Normocephalic and atraumatic.  Pulmonary:      Effort: Pulmonary effort is normal.       Review of Systems:   Cardiac:           Some SOB with exertion after chemo, no chest pain with exertion, no edema, orthopnea  Distress:          +sadness, no hopelessness, no anhedonia, + worry/anxiety  Cognitive:        No trouble with memory, no difficulty paying attention, no brain fog, no trouble functioning with work or home life  Fatigue:           Energy level adequate, performing ADL's, no morning fatigue                           Fatigue  1  / 10  ( Scale 0 - 10)   Hormonal:       No hot flashes, no night sweats  Pain:                Has no pain,  location:                          Pain 0   / 10 (Scale 0 - 10)    Neuropathy:     "No numbness, no tingling, no paresthesia   Sleep:              No difficulty falling asleep, no waking up in night, no daytime sleepiness, no snoring  Altered function:          No problems with money management,  no problems with daily organization & planning  Weight:           No concerns with weight, wants to lose a little and maintain healthy        Labs:   Lab Results   Component Value Date    WBC 6.92 03/05/2024    HGB 11.9 (L) 03/05/2024    HCT 37.1 03/05/2024    MCV 90 03/05/2024     03/05/2024           No results found for: "HGBA1C"         Assessment:   Patient is a 38 y.o.female who arrived to Integrative Oncology for consult during cancer treatment.       Plan   #Nutrition: Encourage plant-forward anti-inflammatory diet with plenty of protein   # Sleep: Recommend 6-8 hours of restful sleep nightly; recommend strong sleep hygiene routine one hour prior to bed.   # Exercise: Recommend 60 minutes of gentle movement/light activity per day (cleaning, walking, cooking, gardening, stationary bike) at baseline and, if can tolerate, build up to at least 150 minutes (2 ½ hours) of moderate-intensity exercise or 75 minutes (1 ¼ hours) of vigorous-intensity aerobic exercise (eg: brisk walking/jogging/swimming/cycling) over each week.  Two to three resistance-based (strength) exercise sessions (eg: lifting weights, therabands or using own body weight) over each week if can tolerate.  it is important to focus on activities that will help you SAFELY AND GRADUALLY BUILD STRENGTH BUT DO NOT DEPLETE YOUR ENERGY LEVELS.  Encouraged patient to start walking 30 minutes  2-3 x weekly  Also offered resource of Yoga by [employer]argavi, patient can try beginning yoga and start with 10-15 minute sessions  Offered physical therapy and Occupational therapy/yoga but patient does not want to add more in-person appointments  # Acupuncture offered, patient not wanting to add more in-person appointments.  Encouraged patient to consider " if she gets neuropathy  # Psychology for anxiety, depression, situational adjustment disorder, overwhelm in making decisions  #  Discussed how meditation can be helpful and how Guided Imagery can help and research shows that it helped with side effects from things like surgery and chemo.  Shared resources with patient  #Follow-Up:  PRN

## 2024-03-19 NOTE — TELEPHONE ENCOUNTER
LVM in regards to scheduling nutrition and can psych referrals placed by Joanna Dubinsky, PA-C. MA instructed pt. in voicemail to call the office number for scheduling.       REEMA JOHNSON ext [phone]

## 2024-03-20 ENCOUNTER — PATIENT MESSAGE (OUTPATIENT)
Dept: ADMINISTRATIVE | Facility: OTHER | Age: 39
End: 2024-03-20
Payer: COMMERCIAL

## 2024-03-21 ENCOUNTER — PATIENT MESSAGE (OUTPATIENT)
Dept: ADMINISTRATIVE | Facility: OTHER | Age: 39
End: 2024-03-21
Payer: COMMERCIAL

## 2024-03-22 ENCOUNTER — TELEPHONE (OUTPATIENT)
Dept: HEMATOLOGY/ONCOLOGY | Facility: CLINIC | Age: 39
End: 2024-03-22
Payer: COMMERCIAL

## 2024-03-22 ENCOUNTER — OFFICE VISIT (OUTPATIENT)
Dept: PSYCHIATRY | Facility: CLINIC | Age: 39
End: 2024-03-22
Payer: COMMERCIAL

## 2024-03-22 ENCOUNTER — PATIENT MESSAGE (OUTPATIENT)
Dept: ADMINISTRATIVE | Facility: OTHER | Age: 39
End: 2024-03-22
Payer: COMMERCIAL

## 2024-03-22 DIAGNOSIS — F43.22 ADJUSTMENT DISORDER WITH ANXIOUS MOOD: Primary | ICD-10-CM

## 2024-03-22 DIAGNOSIS — C50.919 BREAST CANCER: ICD-10-CM

## 2024-03-22 PROCEDURE — 90791 PSYCH DIAGNOSTIC EVALUATION: CPT | Mod: 95,,, | Performed by: CASE MANAGER/CARE COORDINATOR

## 2024-03-22 NOTE — TELEPHONE ENCOUNTER
Spoke w/ pt to schedule f/u visit per Dr. Cohn. Pt approved f/u virtual for Monday 4/8/24 @ 9:30AM.

## 2024-03-22 NOTE — PROGRESS NOTES
The patient location is:  at home at [address] [place] [address]  (Provider licensed in [place] and [place])  The patient location [place] is: [place]  The patient phone number is: [phone]   Visit type: Virtual visit with synchronous audio and video  Each patient to whom he or she provides medical services by telemedicine is:  (1) informed of the relationship between the provider and patient and the respective role of any other health care provider with respect to management of the patient; and (2) notified that he or she may decline to receive medical services by telemedicine and may withdraw from such care at any time.    Crisis Disclaimer: Patient was informed that due to the virtual nature of the visit, that if a crisis develops, protocols will be implemented to ensure patient safety, including but not limited to: 1) Initiating a welfare check with local Law Enforcement, 2) Calling 1/National Crisis Hotline, and/or 3) Initiating PEC/CEC procedures.    Time (Face-to-face): 44 minutes    Time (Non-face-to-face): 20 minutes     PSYCHO-ONCOLOGY INTAKE    Diagnostic Interview - CPT 82494    Date: 3/22/2024  Site: [facility]     Name: Madhu Dela Cruz     Evaluation Length (direct face-to-face time):   44 minutes     Referral Source: Dubinsky, Joanna L., PA*   Oncologist: Kassie Blakely MD    PCP: Davis Hanson MD    Clinical status of patient: Outpatient    Madhu Dela Cruz, a 38 y.o. female, seen for initial evaluation visit.  INFORMED CONSENT/ LIMITS of CONFIDENTIALITY: Prior to beginning the interview, the patient's identification was confirmed using two identifiers. Madhu Dela Cruz  was informed of the possible risks and benefits of psychological interventions (e.g., counseling, psychotherapy, testing) and provided information regarding the handling of protected health records and the limits of confidentiality, including the importance of reporting any suicidal  or homicidal ideation to ensure safety of all parties. This provider explained the purpose of today's appointment and the patient was provided with time to ask questions regarding this information.  Acceptance and understanding of these conditions was expressed, and Madhu Dela Cruz freely consented to this evaluation.     Chief complaint/reason for encounter: adjustment to illness, anxiety    Medical/Surgical History:    Patient Active Problem List   Diagnosis    Malignant neoplasm of upper-outer quadrant of right breast in female, estrogen receptor positive    Chemotherapy induced diarrhea    Dehydration       Health Behaviors:       ETOH Use: Not currently but patient noted she drank socially at times before diagnosis      Tobacco Use: No   Illicit Drug Use:  No     Prescription Misuse:No   Caffeine: 1 caffeine drink per day currently   Exercise: Patient noted no current regular exercise routine.   Firearms:  Yes, patient noted they are stored locked up.   Advanced directives:No     Family History:   Psychiatric illness: Yes, patient shared that her paternal grandmother was inpatient multiple times due to SI. She also shared that her father has history of depression.     Alcohol/Drug Abuse: No     Suicide: Yes, patient shared that her paternal grandmother did attempt suicide during her life.      Past Psychiatric History:   Inpatient treatment: No     Outpatient treatment: Yes, patient noted past therapy for marital issues.    Prior substance abuse treatment: No     Suicide Attempts: No     Psychotropic Medications:  Current: Xanax that patient noted she takes as needed       Past: none    Current medications as per below, allergies reviewed in chart.    Current Outpatient Medications   Medication    dexAMETHasone (DECADRON) 4 MG Tab    duke's soln (benadryl 30 mL, mylanta 30 mL, LIDOcaine 30 mL, nystatin 30 mL) 120mL    ergocalciferol, vitamin D2, (VITAMIN D ORAL)    LACTOBACILLUS ACIDOPHILUS ORAL     LIDOcaine-prilocaine (EMLA) cream    OLANZapine (ZYPREXA) 5 MG tablet    omega 3-dha-epa-fish oil 1,200 (144-216) mg Cap    ondansetron (ZOFRAN) 4 MG tablet    phenazopyridine (PYRIDIUM) 100 MG tablet    prochlorperazine (COMPAZINE) 5 MG tablet     Current Facility-Administered Medications   Medication Frequency    copper intrauterine device 380 square mm 380 mm IUD               Social situation/Stressors: Madhu Dela Cruz lives with her  and son in [place], [place].  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 Patient shared that she works from home.    Madhu Dela Cruz shared that she  her  in 2015 and has 2 children. She shared that she has 1 adult daughter and one 16 year old son. The patient reports social support from her , daughter, mother, sister, and friends.  Madhu Dela Cruz shared that she is  Zoroastrianism and that prayer helps her cope . Patient shared that she has had anxiety since she noticed a lump. She reported worries about treatment course and if she will miss important activities due to treatment.  Madhu Dela Cruz's hobbies include shopping, going to restaurants, and time with friends.  Additional stressors: Patient shared that she has anxiety prior to chemotherapy as well.    Strengths:Able to vocalize needs and good social supports  Liabilities: Patient reported fatigue and that she needs to take breaks more often when doing tasks.    Current Evaluation:     Mental Status Exam: Madhu Dela Cruz arrived promptly for the assessment session.  The patient was fully cooperative throughout the interview and was an adequate historian   Appearance: age appropriate, appropriately  dressed, adequately  groomed  Behavior/Cooperation: friendly and cooperative  Speech: normal in rate, volume, and tone and appropriate quality, quantity and organization of sentences  Mood:   "steady with slight low mood  Affect: mood congruent as evidenced by patient tearing up during the session.  Thought Process: goal-directed, logical  Thought Content: normal, no suicidality, no homicidality, delusions, or paranoia; did not appear to be responding to internal stimuli during the interview.   Orientation: grossly intact  Memory: Patient did note "foggy" memory at times since chemotherapy  Attention Span/Concentration: Attends to interview without distraction  Fund of Knowledge: average  Estimate of Intelligence: average from verbal skills and history  Cognition: Patient reported "foggy" memory at times since chemotherapy.  Insight: patient has awareness of illness; good insight into own behavior and behavior of others  Judgment: the patient's behavior is adequate to circumstances      History of present illness:    Oncology History   Malignant neoplasm of upper-outer quadrant of right breast in female, estrogen receptor positive   1/17/2024 Initial Diagnosis    Malignant neoplasm of upper-outer quadrant of right breast in female, estrogen receptor positive     1/18/2024 Cancer Staged    Staging form: Breast, AJCC 8th Edition  - Clinical stage from 1/18/2024: Stage IB (cT2, cN0, cM0, G3, ER+, VA+, HER2+)     2/15/2024 -  Chemotherapy    Treatment Summary   Plan Name: OP BREAST TCHP (pertuzumab trastuzumab DOCEtaxel CARBOplatin) Q3W  Treatment Goal: Curative  Status: Active  Start Date: 2/15/2024  End Date: 1/16/2025 (Planned)  Provider: Kassie Blakely MD  Chemotherapy: CARBOplatin (PARAPLATIN) 900 mg in sodium chloride 0.9% 285 mL chemo infusion, 900 mg, Intravenous, Clinic/HOD 1 time, 2 of 6 cycles  Administration: 900 mg (2/15/2024), 900 mg (3/6/2024)  DOCEtaxel (TAXOTERE) 75 mg/m2 = 154 mg in sodium chloride 0.9% 300.4 mL chemo infusion, 75 mg/m2 = 154 mg, Intravenous, Clinic/HOD 1 time, 2 of 6 cycles  Administration: 154 mg (2/15/2024), 154 mg (3/6/2024)  pertuzumab (PERJETA) 840 mg in sodium " chloride 0.9% 313 mL infusion, 840 mg, Intravenous, Clinic/HOD 1 time, 2 of 17 cycles  Administration: 840 mg (2/15/2024), 420 mg (3/6/2024)  trastuzumab-anns (KANJINTI) 747 mg in sodium chloride 0.9% 320.57 mL chemo infusion, 8 mg/kg = 747 mg, Intravenous, Clinic/HOD 1 time, 2 of 17 cycles  Administration: 747 mg (2/15/2024), 560 mg (3/6/2024)           Madhu Dela Cruz has adjusted to illness  with good and bad days  primarily through prayer. She shared that talking to her supports helps cope with general stressors.  She has engaged in appropriate information gathering.  The patient has good family/friend support.  Her support system is coping adequately with the diagnosis/treatment/prognosis. Illness-related psychosocial stressors include  patient needing to take breaks more often . She also stated that she is currently in school but will take time off of school for treatment.  The patient has a good partnership with her [facility] treatment team. The patient reports the following barriers to cancer care:none. Patient shared anxiety that started when she found a lump. She also noted irritability.    NCCN Distress thermometer:       3/22/2024     9:29 AM 3/5/2024     9:37 AM 2/23/2024     8:59 AM 2/22/2024    12:28 PM 2/15/2024     7:58 AM 2/14/2024    10:40 AM 1/24/2024     1:53 PM   DISTRESS SCREENING   Distress Score 7 6 5 5 6 6 7   Practical Concerns Taking care of myself;Treatment decisions Taking care of myself None of these Taking care of myself  None of these Taking care of myself;Work;Treatment decisions   Social Concerns None of these None of these None of these None of these  None of these None of these   Emotional Concerns Worry or anxiety Worry or anxiety;Changes in appearance None of these None of these  None of these Worry or anxiety   Spiritual or Orthodoxy Concerns None of these None of these None of these None of these  None of these None of these   Physical Concerns None of these None  of these None of these Fatigue  None of these Pain;Sleep            3/22/2024     9:52 AM   GAD7   1. Feeling nervous, anxious, or on edge? 2   2. Not being able to stop or control worrying? 1   3. Worrying too much about different things? 1   4. Trouble relaxing? 0   5. Being so restless that it is hard to sit still? 0   6. Becoming easily annoyed or irritable? 2   7. Feeling afraid as if something awful might happen? 1   ROMELIA-7 Score 7          PHQ ANSWERS    Q1. Little interest or pleasure in doing things: Not at all (03/22/24 0954)  Q2. Feeling down, depressed, or hopeless: Not at all (03/22/24 0954)  Q3. Trouble falling or staying asleep, or sleeping too much: Not at all (03/22/24 0954)  Q4. Feeling tired or having little energy: Nearly every day (03/22/24 0954)  Q5. Poor appetite or overeating: Not at all (03/22/24 0954)  Q6. Feeling bad about yourself - or that you are a failure or have let yourself or your family down: Not at all (03/22/24 0954)  Q7. Trouble concentrating on things, such as reading the newspaper or watching television: Not at all (03/22/24 0954)  Q8. Moving or speaking so slowly that other people could have noticed. Or the opposite - being so fidgety or restless that you have been moving around a lot more than usual: Not at all (03/22/24 0954)  Q9. Thoughts that you would be better off dead, or of hurting yourself in some way: Not at all (03/22/24 0954)    PHQ8 Score : 3 (03/22/24 0954)  PHQ-9 Total Score: 3 (03/22/24 0954)     Symptoms:   Mood: fatigue and tearfulness; no SI/HI; NCCN Distress Screener=7; PHQ-9=3  Anxiety: Feeling nervous, anxious, or on edge and Irritability; worries if she will miss important events during treatment; worries about making treatment decisions and before chemotherapy; patient noted anxiety started when feeling a lump and she noted that anxiety presents 2-3 times per week;  ROMELIA-7=7  Substance abuse: denied  Cognitive functioning:  No significant difficulties  "noted. Patient shared that she has "foggy" memory at times since chemotherapy.  Health behaviors:  Patient noted history of reflux and high cholesterol.  Sleep: She shared she sleeps about 6-7 hours per night.  Pain: Ms. Dela Cruz rated her level of pain today as 0/10. She denied any significant difficulties with pain.  Pain Catastrophizing Scale: PCS total score (3; 6th%ile), helplessness (0; 6th%ile), magnification (0; 14th%ile), and rumination (3; 19th%ile).      Assessment - Diagnosis - Goals:       ICD-10-CM ICD-9-CM   1. Adjustment disorder with anxious mood  F43.22 309.24   2. Breast cancer  C50.919 174.9         Plan:individual psychotherapy    Summary and Recommendations  Madhu Dela Cruz is a 38 y.o. female referred by Dubinsky, Joanna L., PA* for psychological evaluation and treatment.  Ms. Dela Cruz appears to be having some difficulty coping with her diagnosis and proposed treatment course. Patient reported increased anxiety related to illness and treatment course.  She is interested in individual therapy for cancer coping skills and will follow up with me for that purpose. Talked with patient about deep breathing exercise for relaxation.  She was encouraged to continue with pleasant events scheduling.    Return to clinic: 2 weeks    GOALS:   Pleasant events scheduling  Increase coping skills        Javier Cohn Psy.D.  Clinical Psychologist  [place] License #[id number]  [place] License #[id number]            "

## 2024-03-25 ENCOUNTER — PATIENT MESSAGE (OUTPATIENT)
Dept: ADMINISTRATIVE | Facility: OTHER | Age: 39
End: 2024-03-25
Payer: COMMERCIAL

## 2024-03-25 ENCOUNTER — PATIENT MESSAGE (OUTPATIENT)
Dept: HEMATOLOGY/ONCOLOGY | Facility: CLINIC | Age: 39
End: 2024-03-25
Payer: COMMERCIAL

## 2024-03-25 DIAGNOSIS — Z17.0 MALIGNANT NEOPLASM OF UPPER-OUTER QUADRANT OF RIGHT BREAST IN FEMALE, ESTROGEN RECEPTOR POSITIVE: Primary | ICD-10-CM

## 2024-03-25 DIAGNOSIS — R60.0 LEG EDEMA: Primary | ICD-10-CM

## 2024-03-25 DIAGNOSIS — C50.411 MALIGNANT NEOPLASM OF UPPER-OUTER QUADRANT OF RIGHT BREAST IN FEMALE, ESTROGEN RECEPTOR POSITIVE: Primary | ICD-10-CM

## 2024-03-25 RX ORDER — FUROSEMIDE 20 MG/1
20 TABLET ORAL DAILY
Qty: 5 TABLET | Refills: 0 | Status: SHIPPED | OUTPATIENT
Start: 2024-03-25 | End: 2024-04-19 | Stop reason: SDUPTHER

## 2024-03-27 ENCOUNTER — LAB VISIT (OUTPATIENT)
Dept: LAB | Facility: HOSPITAL | Age: 39
End: 2024-03-27
Payer: COMMERCIAL

## 2024-03-27 ENCOUNTER — PATIENT MESSAGE (OUTPATIENT)
Dept: ADMINISTRATIVE | Facility: OTHER | Age: 39
End: 2024-03-27
Payer: COMMERCIAL

## 2024-03-27 DIAGNOSIS — Z17.0 MALIGNANT NEOPLASM OF UPPER-OUTER QUADRANT OF RIGHT BREAST IN FEMALE, ESTROGEN RECEPTOR POSITIVE: ICD-10-CM

## 2024-03-27 DIAGNOSIS — R60.0 LEG EDEMA: ICD-10-CM

## 2024-03-27 DIAGNOSIS — C50.411 MALIGNANT NEOPLASM OF UPPER-OUTER QUADRANT OF RIGHT BREAST IN FEMALE, ESTROGEN RECEPTOR POSITIVE: ICD-10-CM

## 2024-03-27 LAB
ALBUMIN SERPL BCP-MCNC: 3.2 G/DL (ref 3.5–5.2)
ALP SERPL-CCNC: 93 U/L (ref 55–135)
ALT SERPL W/O P-5'-P-CCNC: 19 U/L (ref 10–44)
ANION GAP SERPL CALC-SCNC: 5 MMOL/L (ref 8–16)
AST SERPL-CCNC: 16 U/L (ref 10–40)
BASOPHILS # BLD AUTO: 0.01 K/UL (ref 0–0.2)
BASOPHILS NFR BLD: 0.2 % (ref 0–1.9)
BILIRUB SERPL-MCNC: 0.2 MG/DL (ref 0.1–1)
BNP SERPL-MCNC: <10 PG/ML (ref 0–99)
BUN SERPL-MCNC: 13 MG/DL (ref 6–20)
CALCIUM SERPL-MCNC: 9 MG/DL (ref 8.7–10.5)
CHLORIDE SERPL-SCNC: 109 MMOL/L (ref 95–110)
CO2 SERPL-SCNC: 25 MMOL/L (ref 23–29)
CREAT SERPL-MCNC: 0.7 MG/DL (ref 0.5–1.4)
DIFFERENTIAL METHOD BLD: ABNORMAL
EOSINOPHIL # BLD AUTO: 0 K/UL (ref 0–0.5)
EOSINOPHIL NFR BLD: 0.2 % (ref 0–8)
ERYTHROCYTE [DISTWIDTH] IN BLOOD BY AUTOMATED COUNT: 15.5 % (ref 11.5–14.5)
EST. GFR  (NO RACE VARIABLE): >60 ML/MIN/1.73 M^2
GLUCOSE SERPL-MCNC: 101 MG/DL (ref 70–110)
HCG INTACT+B SERPL-ACNC: <2.4 MIU/ML
HCT VFR BLD AUTO: 34.1 % (ref 37–48.5)
HGB BLD-MCNC: 11.1 G/DL (ref 12–16)
IMM GRANULOCYTES # BLD AUTO: 0.01 K/UL (ref 0–0.04)
IMM GRANULOCYTES NFR BLD AUTO: 0.2 % (ref 0–0.5)
LYMPHOCYTES # BLD AUTO: 2.3 K/UL (ref 1–4.8)
LYMPHOCYTES NFR BLD: 42 % (ref 18–48)
MCH RBC QN AUTO: 30.2 PG (ref 27–31)
MCHC RBC AUTO-ENTMCNC: 32.6 G/DL (ref 32–36)
MCV RBC AUTO: 93 FL (ref 82–98)
MONOCYTES # BLD AUTO: 0.8 K/UL (ref 0.3–1)
MONOCYTES NFR BLD: 14.1 % (ref 4–15)
NEUTROPHILS # BLD AUTO: 2.3 K/UL (ref 1.8–7.7)
NEUTROPHILS NFR BLD: 43.3 % (ref 38–73)
NRBC BLD-RTO: 0 /100 WBC
PLATELET # BLD AUTO: 206 K/UL (ref 150–450)
PMV BLD AUTO: 9.3 FL (ref 9.2–12.9)
POTASSIUM SERPL-SCNC: 4.3 MMOL/L (ref 3.5–5.1)
PROT SERPL-MCNC: 5.8 G/DL (ref 6–8.4)
RBC # BLD AUTO: 3.68 M/UL (ref 4–5.4)
SODIUM SERPL-SCNC: 139 MMOL/L (ref 136–145)
WBC # BLD AUTO: 5.38 K/UL (ref 3.9–12.7)

## 2024-03-27 PROCEDURE — 36415 COLL VENOUS BLD VENIPUNCTURE: CPT | Mod: PN | Performed by: STUDENT IN AN ORGANIZED HEALTH CARE EDUCATION/TRAINING PROGRAM

## 2024-03-27 PROCEDURE — 84702 CHORIONIC GONADOTROPIN TEST: CPT | Performed by: STUDENT IN AN ORGANIZED HEALTH CARE EDUCATION/TRAINING PROGRAM

## 2024-03-27 PROCEDURE — 80053 COMPREHEN METABOLIC PANEL: CPT | Performed by: STUDENT IN AN ORGANIZED HEALTH CARE EDUCATION/TRAINING PROGRAM

## 2024-03-27 PROCEDURE — 85025 COMPLETE CBC W/AUTO DIFF WBC: CPT | Performed by: STUDENT IN AN ORGANIZED HEALTH CARE EDUCATION/TRAINING PROGRAM

## 2024-03-27 PROCEDURE — 83880 ASSAY OF NATRIURETIC PEPTIDE: CPT | Performed by: NURSE PRACTITIONER

## 2024-03-28 ENCOUNTER — INFUSION (OUTPATIENT)
Dept: INFUSION THERAPY | Facility: HOSPITAL | Age: 39
End: 2024-03-28
Payer: COMMERCIAL

## 2024-03-28 ENCOUNTER — OFFICE VISIT (OUTPATIENT)
Dept: HEMATOLOGY/ONCOLOGY | Facility: CLINIC | Age: 39
End: 2024-03-28
Payer: COMMERCIAL

## 2024-03-28 ENCOUNTER — PATIENT MESSAGE (OUTPATIENT)
Dept: ADMINISTRATIVE | Facility: OTHER | Age: 39
End: 2024-03-28
Payer: COMMERCIAL

## 2024-03-28 VITALS
OXYGEN SATURATION: 99 % | RESPIRATION RATE: 18 BRPM | TEMPERATURE: 98 F | DIASTOLIC BLOOD PRESSURE: 75 MMHG | SYSTOLIC BLOOD PRESSURE: 123 MMHG | HEART RATE: 102 BPM

## 2024-03-28 VITALS
HEIGHT: 64 IN | DIASTOLIC BLOOD PRESSURE: 72 MMHG | OXYGEN SATURATION: 98 % | BODY MASS INDEX: 37.16 KG/M2 | HEART RATE: 92 BPM | SYSTOLIC BLOOD PRESSURE: 124 MMHG | WEIGHT: 217.69 LBS | RESPIRATION RATE: 20 BRPM

## 2024-03-28 DIAGNOSIS — C50.411 MALIGNANT NEOPLASM OF UPPER-OUTER QUADRANT OF RIGHT BREAST IN FEMALE, ESTROGEN RECEPTOR POSITIVE: Primary | ICD-10-CM

## 2024-03-28 DIAGNOSIS — Z17.0 MALIGNANT NEOPLASM OF UPPER-OUTER QUADRANT OF RIGHT BREAST IN FEMALE, ESTROGEN RECEPTOR POSITIVE: Primary | ICD-10-CM

## 2024-03-28 DIAGNOSIS — T45.1X5A CHEMOTHERAPY INDUCED DIARRHEA: ICD-10-CM

## 2024-03-28 DIAGNOSIS — K52.1 CHEMOTHERAPY INDUCED DIARRHEA: ICD-10-CM

## 2024-03-28 PROCEDURE — 96413 CHEMO IV INFUSION 1 HR: CPT

## 2024-03-28 PROCEDURE — 25000003 PHARM REV CODE 250: Performed by: NURSE PRACTITIONER

## 2024-03-28 PROCEDURE — 99999 PR PBB SHADOW E&M-EST. PATIENT-LVL IV: CPT | Mod: PBBFAC,,, | Performed by: NURSE PRACTITIONER

## 2024-03-28 PROCEDURE — 99215 OFFICE O/P EST HI 40 MIN: CPT | Mod: S$GLB,,, | Performed by: NURSE PRACTITIONER

## 2024-03-28 PROCEDURE — 3008F BODY MASS INDEX DOCD: CPT | Mod: CPTII,S$GLB,, | Performed by: NURSE PRACTITIONER

## 2024-03-28 PROCEDURE — 1160F RVW MEDS BY RX/DR IN RCRD: CPT | Mod: CPTII,S$GLB,, | Performed by: NURSE PRACTITIONER

## 2024-03-28 PROCEDURE — 96417 CHEMO IV INFUS EACH ADDL SEQ: CPT

## 2024-03-28 PROCEDURE — 3074F SYST BP LT 130 MM HG: CPT | Mod: CPTII,S$GLB,, | Performed by: NURSE PRACTITIONER

## 2024-03-28 PROCEDURE — 96375 TX/PRO/DX INJ NEW DRUG ADDON: CPT

## 2024-03-28 PROCEDURE — 96367 TX/PROPH/DG ADDL SEQ IV INF: CPT

## 2024-03-28 PROCEDURE — 3078F DIAST BP <80 MM HG: CPT | Mod: CPTII,S$GLB,, | Performed by: NURSE PRACTITIONER

## 2024-03-28 PROCEDURE — 1159F MED LIST DOCD IN RCRD: CPT | Mod: CPTII,S$GLB,, | Performed by: NURSE PRACTITIONER

## 2024-03-28 PROCEDURE — 63600175 PHARM REV CODE 636 W HCPCS: Performed by: NURSE PRACTITIONER

## 2024-03-28 RX ORDER — PROCHLORPERAZINE EDISYLATE 5 MG/ML
10 INJECTION INTRAMUSCULAR; INTRAVENOUS ONCE AS NEEDED
Status: DISCONTINUED | OUTPATIENT
Start: 2024-03-28 | End: 2024-03-28 | Stop reason: HOSPADM

## 2024-03-28 RX ORDER — EPINEPHRINE 0.3 MG/.3ML
0.3 INJECTION SUBCUTANEOUS ONCE AS NEEDED
Status: DISCONTINUED | OUTPATIENT
Start: 2024-03-28 | End: 2024-03-28 | Stop reason: HOSPADM

## 2024-03-28 RX ORDER — FAMOTIDINE 10 MG/ML
20 INJECTION INTRAVENOUS
Status: CANCELLED
Start: 2024-03-28 | End: 2024-03-28

## 2024-03-28 RX ORDER — PROCHLORPERAZINE EDISYLATE 5 MG/ML
10 INJECTION INTRAMUSCULAR; INTRAVENOUS ONCE AS NEEDED
Status: CANCELLED | OUTPATIENT
Start: 2024-03-28

## 2024-03-28 RX ORDER — EPINEPHRINE 0.3 MG/.3ML
0.3 INJECTION SUBCUTANEOUS ONCE AS NEEDED
Status: CANCELLED | OUTPATIENT
Start: 2024-03-28

## 2024-03-28 RX ORDER — SODIUM CHLORIDE 0.9 % (FLUSH) 0.9 %
10 SYRINGE (ML) INJECTION
Status: DISCONTINUED | OUTPATIENT
Start: 2024-03-28 | End: 2024-03-28 | Stop reason: HOSPADM

## 2024-03-28 RX ORDER — HEPARIN 100 UNIT/ML
500 SYRINGE INTRAVENOUS
Status: CANCELLED | OUTPATIENT
Start: 2024-03-28

## 2024-03-28 RX ORDER — DIPHENHYDRAMINE HYDROCHLORIDE 50 MG/ML
50 INJECTION INTRAMUSCULAR; INTRAVENOUS ONCE AS NEEDED
Status: CANCELLED | OUTPATIENT
Start: 2024-03-28

## 2024-03-28 RX ORDER — MEPERIDINE HYDROCHLORIDE 50 MG/ML
25 INJECTION INTRAMUSCULAR; INTRAVENOUS; SUBCUTANEOUS ONCE AS NEEDED
Status: DISCONTINUED | OUTPATIENT
Start: 2024-03-28 | End: 2024-03-28 | Stop reason: HOSPADM

## 2024-03-28 RX ORDER — HEPARIN 100 UNIT/ML
500 SYRINGE INTRAVENOUS
Status: DISCONTINUED | OUTPATIENT
Start: 2024-03-28 | End: 2024-03-28 | Stop reason: HOSPADM

## 2024-03-28 RX ORDER — MEPERIDINE HYDROCHLORIDE 50 MG/ML
25 INJECTION INTRAMUSCULAR; INTRAVENOUS; SUBCUTANEOUS ONCE AS NEEDED
Status: CANCELLED | OUTPATIENT
Start: 2024-03-28

## 2024-03-28 RX ORDER — SODIUM CHLORIDE 0.9 % (FLUSH) 0.9 %
10 SYRINGE (ML) INJECTION
Status: CANCELLED | OUTPATIENT
Start: 2024-03-28

## 2024-03-28 RX ORDER — FAMOTIDINE 10 MG/ML
20 INJECTION INTRAVENOUS
Status: COMPLETED | OUTPATIENT
Start: 2024-03-28 | End: 2024-03-28

## 2024-03-28 RX ORDER — DIPHENHYDRAMINE HYDROCHLORIDE 50 MG/ML
50 INJECTION INTRAMUSCULAR; INTRAVENOUS ONCE AS NEEDED
Status: DISCONTINUED | OUTPATIENT
Start: 2024-03-28 | End: 2024-03-28 | Stop reason: HOSPADM

## 2024-03-28 RX ADMIN — FOSAPREPITANT DIMEGLUMINE 150 MG: 150 INJECTION, POWDER, LYOPHILIZED, FOR SOLUTION INTRAVENOUS at 02:03

## 2024-03-28 RX ADMIN — CARBOPLATIN 900 MG: 10 INJECTION, SOLUTION INTRAVENOUS at 05:03

## 2024-03-28 RX ADMIN — DIPHENHYDRAMINE HYDROCHLORIDE 50 MG: 50 INJECTION, SOLUTION INTRAMUSCULAR; INTRAVENOUS at 01:03

## 2024-03-28 RX ADMIN — DOCETAXEL ANHYDROUS 154 MG: 10 INJECTION, SOLUTION INTRAVENOUS at 04:03

## 2024-03-28 RX ADMIN — PERTUZUMAB 420 MG: 30 INJECTION, SOLUTION, CONCENTRATE INTRAVENOUS at 02:03

## 2024-03-28 RX ADMIN — FAMOTIDINE 20 MG: 10 INJECTION INTRAVENOUS at 01:03

## 2024-03-28 RX ADMIN — HEPARIN 500 UNITS: 100 SYRINGE at 06:03

## 2024-03-28 RX ADMIN — TRASTUZUMAB-ANNS 560 MG: 420 INJECTION, POWDER, LYOPHILIZED, FOR SOLUTION INTRAVENOUS at 03:03

## 2024-03-28 RX ADMIN — DEXAMETHASONE SODIUM PHOSPHATE 0.25 MG: 4 INJECTION, SOLUTION INTRA-ARTICULAR; INTRALESIONAL; INTRAMUSCULAR; INTRAVENOUS; SOFT TISSUE at 01:03

## 2024-03-28 NOTE — PLAN OF CARE
Problem: Anemia (Chemotherapy Effects)  Goal: Anemia Symptom Improvement  Outcome: Ongoing, Progressing     Problem: Urinary Bleeding Risk or Actual (Chemotherapy Effects)  Goal: Absence of Hematuria  Outcome: Ongoing, Progressing     Problem: Nausea and Vomiting (Chemotherapy Effects)  Goal: Fluid and Electrolyte Balance  Outcome: Ongoing, Progressing     Problem: Neurotoxicity (Chemotherapy Effects)  Goal: Neurotoxicity Symptom Control  Outcome: Ongoing, Progressing     Problem: Neutropenia (Chemotherapy Effects)  Goal: Absence of Infection  Outcome: Ongoing, Progressing     Problem: Oral Mucositis (Chemotherapy Effects)  Goal: Improved Oral Mucous Membrane Integrity  Outcome: Ongoing, Progressing     Problem: Thrombocytopenia Bleeding Risk (Chemotherapy Effects)  Goal: Absence of Bleeding  Outcome: Ongoing, Progressing    Ambulatory to clinic with spouse.  No c/o adverse effects or s/s of infection.  PAC accessed, flushed with out difficulty, blood return noted and infused with no problems.  Premeds and TCHP tolerated with no problems.  Ambulatory home with spouse.  NAD noted.

## 2024-03-30 ENCOUNTER — INFUSION (OUTPATIENT)
Dept: INFUSION THERAPY | Facility: HOSPITAL | Age: 39
End: 2024-03-30
Payer: COMMERCIAL

## 2024-03-30 ENCOUNTER — PATIENT MESSAGE (OUTPATIENT)
Dept: HEMATOLOGY/ONCOLOGY | Facility: CLINIC | Age: 39
End: 2024-03-30
Payer: COMMERCIAL

## 2024-03-30 VITALS
RESPIRATION RATE: 18 BRPM | TEMPERATURE: 98 F | DIASTOLIC BLOOD PRESSURE: 66 MMHG | SYSTOLIC BLOOD PRESSURE: 128 MMHG | HEART RATE: 79 BPM

## 2024-03-30 DIAGNOSIS — C50.411 MALIGNANT NEOPLASM OF UPPER-OUTER QUADRANT OF RIGHT BREAST IN FEMALE, ESTROGEN RECEPTOR POSITIVE: Primary | ICD-10-CM

## 2024-03-30 DIAGNOSIS — Z17.0 MALIGNANT NEOPLASM OF UPPER-OUTER QUADRANT OF RIGHT BREAST IN FEMALE, ESTROGEN RECEPTOR POSITIVE: Primary | ICD-10-CM

## 2024-03-30 DIAGNOSIS — K52.1 DIARRHEA DUE TO DRUG: Primary | ICD-10-CM

## 2024-03-30 PROCEDURE — 63600175 PHARM REV CODE 636 W HCPCS: Mod: JZ,JG | Performed by: STUDENT IN AN ORGANIZED HEALTH CARE EDUCATION/TRAINING PROGRAM

## 2024-03-30 PROCEDURE — 96372 THER/PROPH/DIAG INJ SC/IM: CPT

## 2024-03-30 RX ADMIN — PEGFILGRASTIM-CBQV 6 MG: 6 INJECTION, SOLUTION SUBCUTANEOUS at 09:03

## 2024-04-01 RX ORDER — DIPHENOXYLATE HYDROCHLORIDE AND ATROPINE SULFATE 2.5; .025 MG/1; MG/1
1 TABLET ORAL 4 TIMES DAILY PRN
Qty: 60 TABLET | Refills: 1 | Status: SHIPPED | OUTPATIENT
Start: 2024-04-01 | End: 2024-05-01

## 2024-04-03 ENCOUNTER — PATIENT MESSAGE (OUTPATIENT)
Dept: ADMINISTRATIVE | Facility: OTHER | Age: 39
End: 2024-04-03
Payer: COMMERCIAL

## 2024-04-03 ENCOUNTER — PATIENT MESSAGE (OUTPATIENT)
Dept: HEMATOLOGY/ONCOLOGY | Facility: CLINIC | Age: 39
End: 2024-04-03
Payer: COMMERCIAL

## 2024-04-03 ENCOUNTER — HOSPITAL ENCOUNTER (EMERGENCY)
Facility: HOSPITAL | Age: 39
Discharge: HOME | End: 2024-04-03
Attending: EMERGENCY MEDICINE
Payer: COMMERCIAL

## 2024-04-03 VITALS
WEIGHT: 216 LBS | SYSTOLIC BLOOD PRESSURE: 112 MMHG | DIASTOLIC BLOOD PRESSURE: 63 MMHG | HEART RATE: 106 BPM | BODY MASS INDEX: 36.88 KG/M2 | RESPIRATION RATE: 18 BRPM | OXYGEN SATURATION: 96 % | TEMPERATURE: 98 F | HEIGHT: 64 IN

## 2024-04-03 DIAGNOSIS — R07.9 CHEST PAIN: ICD-10-CM

## 2024-04-03 DIAGNOSIS — R06.02 SHORTNESS OF BREATH: Primary | ICD-10-CM

## 2024-04-03 DIAGNOSIS — R00.0 TACHYCARDIA: ICD-10-CM

## 2024-04-03 LAB
ALBUMIN SERPL BCP-MCNC: 3.5 G/DL (ref 3.5–5.2)
ALP SERPL-CCNC: 93 U/L (ref 55–135)
ALT SERPL W/O P-5'-P-CCNC: 20 U/L (ref 10–44)
ANION GAP SERPL CALC-SCNC: 7 MMOL/L (ref 8–16)
AST SERPL-CCNC: 13 U/L (ref 10–40)
BASOPHILS # BLD AUTO: 0.01 K/UL (ref 0–0.2)
BASOPHILS NFR BLD: 0.1 % (ref 0–1.9)
BILIRUB SERPL-MCNC: 0.3 MG/DL (ref 0.1–1)
BNP SERPL-MCNC: <10 PG/ML (ref 0–99)
BUN SERPL-MCNC: 15 MG/DL (ref 6–20)
BUN SERPL-MCNC: 15 MG/DL (ref 6–30)
CALCIUM SERPL-MCNC: 9.2 MG/DL (ref 8.7–10.5)
CHLORIDE SERPL-SCNC: 103 MMOL/L (ref 95–110)
CHLORIDE SERPL-SCNC: 98 MMOL/L (ref 95–110)
CO2 SERPL-SCNC: 26 MMOL/L (ref 23–29)
CREAT SERPL-MCNC: 0.7 MG/DL (ref 0.5–1.4)
CREAT SERPL-MCNC: 0.7 MG/DL (ref 0.5–1.4)
DIFFERENTIAL METHOD BLD: ABNORMAL
EOSINOPHIL # BLD AUTO: 0 K/UL (ref 0–0.5)
EOSINOPHIL NFR BLD: 0.4 % (ref 0–8)
ERYTHROCYTE [DISTWIDTH] IN BLOOD BY AUTOMATED COUNT: 15.9 % (ref 11.5–14.5)
EST. GFR  (NO RACE VARIABLE): >60 ML/MIN/1.73 M^2
GLUCOSE SERPL-MCNC: 144 MG/DL (ref 70–110)
GLUCOSE SERPL-MCNC: 148 MG/DL (ref 70–110)
HCT VFR BLD AUTO: 36.2 % (ref 37–48.5)
HCT VFR BLD CALC: 37 %PCV (ref 36–54)
HGB BLD-MCNC: 12 G/DL (ref 12–16)
IMM GRANULOCYTES # BLD AUTO: 0.1 K/UL (ref 0–0.04)
IMM GRANULOCYTES NFR BLD AUTO: 1.4 % (ref 0–0.5)
LYMPHOCYTES # BLD AUTO: 2.4 K/UL (ref 1–4.8)
LYMPHOCYTES NFR BLD: 34.4 % (ref 18–48)
MCH RBC QN AUTO: 29.6 PG (ref 27–31)
MCHC RBC AUTO-ENTMCNC: 33.1 G/DL (ref 32–36)
MCV RBC AUTO: 89 FL (ref 82–98)
MONOCYTES # BLD AUTO: 1.1 K/UL (ref 0.3–1)
MONOCYTES NFR BLD: 15.3 % (ref 4–15)
NEUTROPHILS # BLD AUTO: 3.4 K/UL (ref 1.8–7.7)
NEUTROPHILS NFR BLD: 48.4 % (ref 38–73)
NRBC BLD-RTO: 0 /100 WBC
PLATELET # BLD AUTO: 168 K/UL (ref 150–450)
PMV BLD AUTO: 9.5 FL (ref 9.2–12.9)
POC IONIZED CALCIUM: 1.14 MMOL/L (ref 1.06–1.42)
POC TCO2 (MEASURED): 25 MMOL/L (ref 23–29)
POTASSIUM BLD-SCNC: 3.6 MMOL/L (ref 3.5–5.1)
POTASSIUM SERPL-SCNC: 3.6 MMOL/L (ref 3.5–5.1)
PROT SERPL-MCNC: 6.6 G/DL (ref 6–8.4)
RBC # BLD AUTO: 4.05 M/UL (ref 4–5.4)
SAMPLE: ABNORMAL
SODIUM BLD-SCNC: 136 MMOL/L (ref 136–145)
SODIUM SERPL-SCNC: 136 MMOL/L (ref 136–145)
TROPONIN I SERPL DL<=0.01 NG/ML-MCNC: <0.006 NG/ML (ref 0–0.03)
WBC # BLD AUTO: 7.07 K/UL (ref 3.9–12.7)

## 2024-04-03 PROCEDURE — 93005 ELECTROCARDIOGRAM TRACING: CPT

## 2024-04-03 PROCEDURE — 25000003 PHARM REV CODE 250: Performed by: EMERGENCY MEDICINE

## 2024-04-03 PROCEDURE — 93010 ELECTROCARDIOGRAM REPORT: CPT | Mod: ,,, | Performed by: INTERNAL MEDICINE

## 2024-04-03 PROCEDURE — 83880 ASSAY OF NATRIURETIC PEPTIDE: CPT | Performed by: EMERGENCY MEDICINE

## 2024-04-03 PROCEDURE — 25500020 PHARM REV CODE 255: Performed by: EMERGENCY MEDICINE

## 2024-04-03 PROCEDURE — 85025 COMPLETE CBC W/AUTO DIFF WBC: CPT | Performed by: EMERGENCY MEDICINE

## 2024-04-03 PROCEDURE — 99285 EMERGENCY DEPT VISIT HI MDM: CPT | Mod: 25

## 2024-04-03 PROCEDURE — 80048 BASIC METABOLIC PNL TOTAL CA: CPT | Mod: XB

## 2024-04-03 PROCEDURE — 84484 ASSAY OF TROPONIN QUANT: CPT | Performed by: EMERGENCY MEDICINE

## 2024-04-03 PROCEDURE — 96360 HYDRATION IV INFUSION INIT: CPT

## 2024-04-03 PROCEDURE — 80053 COMPREHEN METABOLIC PANEL: CPT | Performed by: EMERGENCY MEDICINE

## 2024-04-03 RX ADMIN — SODIUM CHLORIDE 1000 ML: 9 INJECTION, SOLUTION INTRAVENOUS at 08:04

## 2024-04-03 RX ADMIN — IOHEXOL 100 ML: 350 INJECTION, SOLUTION INTRAVENOUS at 10:04

## 2024-04-04 ENCOUNTER — TELEPHONE (OUTPATIENT)
Dept: HEMATOLOGY/ONCOLOGY | Facility: CLINIC | Age: 39
End: 2024-04-04
Payer: COMMERCIAL

## 2024-04-04 ENCOUNTER — PATIENT MESSAGE (OUTPATIENT)
Dept: HEMATOLOGY/ONCOLOGY | Facility: CLINIC | Age: 39
End: 2024-04-04
Payer: COMMERCIAL

## 2024-04-04 ENCOUNTER — PATIENT MESSAGE (OUTPATIENT)
Dept: ADMINISTRATIVE | Facility: OTHER | Age: 39
End: 2024-04-04
Payer: COMMERCIAL

## 2024-04-04 DIAGNOSIS — R00.0 TACHYCARDIA: Primary | ICD-10-CM

## 2024-04-04 LAB
OHS QRS DURATION: 84 MS
OHS QTC CALCULATION: 455 MS

## 2024-04-04 NOTE — ED PROVIDER NOTES
Encounter Date: 4/3/2024       History     Chief Complaint   Patient presents with    Chest Pain     And SOB onset today, chemo pt     HPI  38-year-old female with past medical history as noted below, currently being treated for breast cancer on chemotherapy last chemo about 1 week ago coming in with chest pain, shortness of breath that started today.  She also noticed that her heart rate was abnormally fast in the 120s which is not normal for her.   She denies abdominal pain, fevers, cough.  She denies any new leg pain or leg swelling.  She does complain of some generalized body aches which she says she gets after chemotherapy which is not new for her but the chest pain feels different from that.  She has not on blood thinners.    She does note that she has been having diarrhea which is not new for her she says she gets this with her chemotherapy.  Nonbloody non black.    Review of patient's allergies indicates:   Allergen Reactions    Pertuzumab      Rigors during Kanjinti infusion (after pertuzumab). Resolved with meperidine and ondansetron.    Trastuzumab      Rigors during Kanjinti infusion. Resolved with meperidine and ondansetron.      Past Medical History:   Diagnosis Date    Esophageal reflux     Migraine      History reviewed. No pertinent surgical history.  Family History   Problem Relation Age of Onset    Breast cancer Maternal Aunt     Colon cancer Neg Hx     Ovarian cancer Neg Hx      Social History     Tobacco Use    Smoking status: Never    Smokeless tobacco: Never   Substance Use Topics    Alcohol use: Yes     Comment: 3xwk     Drug use: No     Review of Systems    Physical Exam     Initial Vitals [04/03/24 1651]   BP Pulse Resp Temp SpO2   (!) 143/72 (!) 125 20 98.7 °F (37.1 °C) 97 %      MAP       --         Physical Exam    Physical Exam:  CONSTITUTIONAL: Well developed, well nourished, in no acute distress.  HENT: Normocephalic, atraumatic    EYES: Sclerae anicteric   NECK: Supple, no thyroid  enlargement  CARDIOVASCULAR: Tachycardic, regular, rhythm without any murmurs, gallops, rubs.  No lower extremity swelling or tenderness to palpation.  RESPIRATORY: Speaking in full sentences. Breathing comfortably. Auscultation of the lungs revealed normal breath sounds b/l, no wheezing, no rales, no rhonchi.  ABDOMEN: Soft and nontender, no masses, no rebound or guarding   NEUROLOGIC: Alert, interacting normally. No facial droop.   MSK: Moving all four extremities.  Skin: Warm and dry. No visible rash on exposed areas of skin.    Psych: Mood and affect normal.       ED Course   Procedures  Labs Reviewed   CBC W/ AUTO DIFFERENTIAL - Abnormal; Notable for the following components:       Result Value    Hematocrit 36.2 (*)     RDW 15.9 (*)     Immature Granulocytes 1.4 (*)     Immature Grans (Abs) 0.10 (*)     Mono # 1.1 (*)     Mono % 15.3 (*)     All other components within normal limits   COMPREHENSIVE METABOLIC PANEL - Abnormal; Notable for the following components:    Glucose 144 (*)     Anion Gap 7 (*)     All other components within normal limits   ISTAT PROCEDURE - Abnormal; Notable for the following components:    POC Glucose 148 (*)     All other components within normal limits   B-TYPE NATRIURETIC PEPTIDE   TROPONIN I        ECG Results              EKG 12-lead (Final result)        Collection Time Result Time QRS Duration OHS QTC Calculation    04/03/24 17:13:52 04/04/24 10:32:17 84 455                     Final result by Interface, Lab In [facility] (04/04/24 10:32:29)                   Narrative:    Test Reason : R07.9,    Vent. Rate : 119 BPM     Atrial Rate : 119 BPM     P-R Int : 120 ms          QRS Dur : 084 ms      QT Int : 324 ms       P-R-T Axes : 041 -03 006 degrees     QTc Int : 455 ms    Sinus tachycardia  Possible Left atrial enlargement  Borderline Abnormal ECG  No previous ECGs available  Confirmed by Hiram Diaz MD ([id number]) on 4/4/2024 10:32:16 AM    Referred By: THAD   SELF            Confirmed By:Hiram Diaz MD                                  Imaging Results              CTA Chest Non-Coronary (PE Studies) (Final result)  Result time 04/03/24 22:39:16      Final result by Pascual Chow MD (04/03/24 22:39:16)                   Impression:      Allowing for some significant artifacts, there is no CTA evidence of acute pulmonary thromboembolism or acute thoracic aortic abnormality.    No consolidation or discrete pulmonary or pleural masses.  Artifacts could limit sensitivity for detection of small pulmonary nodules.    Borderline periportal adenopathy is questioned but is poorly delineated on these arterial phase images.  Correlate clinically and consider follow-up outpatient abdominal CT with IV contrast.      Electronically signed by: Pascual Chow  Date:    04/03/2024  Time:    22:39               Narrative:    EXAMINATION:  CTA CHEST NON CORONARY (PE STUDIES)    CLINICAL HISTORY:  Pulmonary embolism (PE) suspected, high prob;CP, SOB, tachy, Breast CA on chemo;    TECHNIQUE:  Low dose axial images, sagittal and coronal reformations were obtained from the thoracic inlet to the lung bases following the IV administration of 100 mL of Omnipaque 350.  Contrast timing was optimized to evaluate the pulmonary arteries.  MIP images were performed.    COMPARISON:  No prior chest CT or CTA.    AP chest x-ray 04/03/2024.    PA and lateral chest x-ray 02/23/2024.    FINDINGS:  Artifacts related to motion and/or beam hardening degrade portions of the scan.    THORACIC INLET: Visualized portions of the thyroid gland demonstrate no discrete masses.    AIRWAYS: The thoracic trachea and visualized central bronchi are patent.    THORACIC ESOPHAGUS: Normal course and caliber.  Small hiatal hernia.    THORACIC AORTA: Allowing for the artifacts, which is specially degrade images through the aortic root and ascending aorta, the thoracic aorta demonstrates a normal course and caliber, and no acute  abnormality.    PULMONARY ARTERIES: Allowing for the artifacts, no discrete filling defects suspicious for pulmonary thromboemboli are demonstrated.    MEDIASTINUM/AV: No discrete mediastinal or hilar mass or lymphadenopathy (by size criteria).    HEART/PERICARDIUM: The tip of a partially visualized left jugular approach upper anterior chest wall infusion port terminates in the right atrium.  Normal-sized cardiac silhouette.  No pericardial effusion.  Engorged appearance of the azygous arch.  Otherwise there is no CT evidence of right heart strain.    LUNGS: The artifacts could diminish sensitivity for detection of small pulmonary nodules, mild interstitial changes, or other small pulmonary abnormalities.  Allowing for this, there is no consolidation or discrete pulmonary mass.    A calcified granuloma is questioned in the right lower lobe (motion artifact limits more definitive assessment).    PLEURA: No pneumothorax or pleural fluid.    UPPER ABDOMEN: Borderline periportal lymphadenopathy is questioned but is poorly delineated on these arterial phase images.    Right adrenal gland normal.    Left adrenal gland not fully visualized.    The imaged portion of the stomach demonstrates very poor luminal distention, limiting CT assessment for gastric mural or mucosal thickening.    No free intraperitoneal air or fluid in the visualized upper abdomen.    Splenic flexure diverticulosis coli, without CT evidence of acute diverticulitis.    There are some air-fluid levels within nondilated left upper quadrant small bowel, significance uncertain.    Visualized portions of the gallbladder, pancreas, liver, spleen, and kidneys demonstrate no discrete masses.    BODY WALL: Visualized axillary and supraclavicular soft tissues demonstrate no lymphadenopathy, by size criteria.    MUSCULOSKELETAL: No acute CT abnormality or advanced degenerative changes in the visualized skeleton.     IMAGES: No additional contributory  findings.                                       X-Ray Chest AP Portable (Final result)  Result time 04/03/24 20:31:04      Final result by Shelton Blakely, DO (04/03/24 20:31:04)                   Impression:      No acute abnormality.      Electronically signed by: Shelton Blakely  Date:    04/03/2024  Time:    20:31               Narrative:    EXAMINATION:  XR CHEST AP PORTABLE    CLINICAL HISTORY:  SOB, CP;    TECHNIQUE:  Single frontal view of the chest was performed.    COMPARISON:  02/23/2024.    FINDINGS:  There is a chest wall port, unchanged in position.  The lungs are well expanded and clear. No focal opacities are seen. The pleural spaces are clear. The cardiac silhouette is unremarkable. The visualized osseous structures are unremarkable.                                       Medications   sodium chloride 0.9% bolus 1,000 mL 1,000 mL (0 mLs Intravenous Stopped 4/3/24 2120)   iohexoL (OMNIPAQUE 350) injection 100 mL (100 mLs Intravenous Given 4/3/24 2200)     Medical Decision Making  Amount and/or Complexity of Data Reviewed  Labs: ordered.  Radiology: ordered.    Risk  Prescription drug management.      38-year-old female with past medical history as noted coming in with chest pain, shortness breath, tachycardia.  No infectious symptoms.  High-risk for PE.  Exam is as noted above.    Will risk stratify for ACS, metabolic hematologic abnormalities, pneumonia, PE with labs, EKG, chest x-ray, PE scan.  Single troponin as symptoms have been ongoing for greater than 6 hours.    Patient declines pain medications at this time.  She says the chest pain is intermittent.  Shortness of breath is only at exertion    Will also give IV fluids for hydration in the setting of diarrhea.    Disposition based on ED workup and patient's pathology.    Update:  Heart rate improving with IV fluids.    Initial labs are entirely unremarkable.  Chest is unremarkable.    Signed out to Dr. Rose pending CT of the chest for  PE.  If negative and patient is feeling improved anticipate discharge home with outpatient follow-up and ED return precautions.             ED Course as of 04/04/24 1045   Wed Apr 03, 2024 2025 Sinus tachycardia rate of 119 with nonspecific ST abnormalities, normal axis, intervals are not remarkable. [BA]      ED Course User Index  [BA] Inder Brown MD                           Clinical Impression:  Final diagnoses:  [R07.9] Chest pain  [R06.02] Shortness of breath (Primary)  [R00.0] Tachycardia          ED Disposition Condition    Discharge Stable          ED Prescriptions    None       Follow-up Information       Follow up With Specialties Details Why Contact Info    Davis Hanson MD Internal Medicine  Schedule a close ER follow-up visit. [address]  [address]  [place] [id number] [phone]      ER   Return to the ER if your condition worsens or if you have any other concerns that you feel need immediate attention.              Inder Brown MD  04/04/24 1040

## 2024-04-04 NOTE — ED NOTES
I-STAT Chem-8+ Results:   Value Reference Range   Sodium 136 136-145 mmol/L   Potassium  3.6 3.5-5.1 mmol/L   Chloride 98  mmol/L   Ionized Calcium 1.14 1.06-1.42 mmol/L   CO2 (measured) 25 23-29 mmol/L   Glucose 148  mg/dL   BUN 15 6-30 mg/dL   Creatinine 0.7 0.5-1.4 mg/dL   Hematocrit 37 36-54%

## 2024-04-04 NOTE — ED NOTES
Madhu Dela Cruz, a 38 y.o. female presents to the ED w/ complaint of sob, palpitations, and L sided intermittent cp described as stabbing that began today. Reports highest at home HR in the 130s. Recently Dx with breast CA in January, receives chemo q3 weeks, last chemo was 1 week ago. Denies lightheadedness or dizziness    Triage note:  Chief Complaint   Patient presents with    Chest Pain     And SOB onset today, chemo pt     Review of patient's allergies indicates:   Allergen Reactions    Pertuzumab      Rigors during Kanjinti infusion (after pertuzumab). Resolved with meperidine and ondansetron.    Trastuzumab      Rigors during Kanjinti infusion. Resolved with meperidine and ondansetron.      Past Medical History:   Diagnosis Date    Esophageal reflux     Migraine    Patient identifiers for Madhu Dela Cruz checked and correct.    LOC: The patient is awake, alert and aware of environment with an appropriate affect, the patient is oriented x 4 and speaking appropriately.  APPEARANCE: Patient resting comfortably and in no acute distress, patient is clean and well groomed, patient's clothing is properly fastened.  SKIN: The skin is warm and dry, color consistent with ethnicity, patient has normal skin turgor and moist mucus membranes, skin intact, no breakdown or bruising noted.  MUSCULOSKELETAL: Patient moving all extremities well, no obvious swelling or deformities noted.  RESPIRATORY: Airway is open and patent, respirations are spontaneous and even, patient has a normal effort and rate. +sob  CARDIAC: Patient has a normal rhythm, tachycardic, no periphreal edema noted, capillary refill < 3 seconds. Normal +2 pedal pulses present. +cp  ABDOMEN: Soft and non tender to palpation, no distention noted. Patient denies any nausea, vomiting, diarrhea, or constipation.   NEUROLOGIC: Eyes open spontaneously, PERRL, behavior appropriate to situation, follows commands, facial expression symmetrical,  bilateral hand grasp equal and even, purposeful motor response noted, normal sensation in all extremities.

## 2024-04-04 NOTE — DISCHARGE INSTRUCTIONS
We know that you have many choices and are honored that you chose us. We hope that we met or exceeded your expectations and goals for this visit and will keep the [facility] family in mind for your future needs and those of your family and friends.     - Dr. Rose

## 2024-04-04 NOTE — PROVIDER PROGRESS NOTES - EMERGENCY DEPT.
Emergency Department Interval Progress Note    Madhu Dela Cruz   38 y.o. female   [id number]      4/3/2024       History     This patient was signed out to me by Dr. Brown at shift change.  Receiving chemotherapy for breast cancer.  Her last treatment was about a week ago.  She presented with chest pain, shortness of breath, and tachycardia that began today.  She did not appear to be in respiratory distress and had clear breath sounds.  Sinus tachycardia EKG with inferior lead T-wave flattening and inversion.  Unremarkable labs with normal white count, hemoglobin, electrolytes, renal function, troponin, and BNP.  CTA chest to rule out PE was pending when I assumed care.  That study is negative for PE and other acute processes.              Physical Examination     Mild tachycardia.  No respiratory distress.  No desaturation with ambulation.       Labs     Labs Reviewed   CBC W/ AUTO DIFFERENTIAL - Abnormal; Notable for the following components:       Result Value    Hematocrit 36.2 (*)     RDW 15.9 (*)     Immature Granulocytes 1.4 (*)     Immature Grans (Abs) 0.10 (*)     Mono # 1.1 (*)     Mono % 15.3 (*)     All other components within normal limits   COMPREHENSIVE METABOLIC PANEL - Abnormal; Notable for the following components:    Glucose 144 (*)     Anion Gap 7 (*)     All other components within normal limits   ISTAT PROCEDURE - Abnormal; Notable for the following components:    POC Glucose 148 (*)     All other components within normal limits   B-TYPE NATRIURETIC PEPTIDE   TROPONIN I   ISTAT CHEM8        Imaging     Imaging Results              CTA Chest Non-Coronary (PE Studies) (Final result)  Result time 04/03/24 22:39:16      Final result by Pascual Chow MD (04/03/24 22:39:16)                   Impression:      Allowing for some significant artifacts, there is no CTA evidence of acute pulmonary thromboembolism or acute thoracic aortic abnormality.    No consolidation or discrete pulmonary or  pleural masses.  Artifacts could limit sensitivity for detection of small pulmonary nodules.    Borderline periportal adenopathy is questioned but is poorly delineated on these arterial phase images.  Correlate clinically and consider follow-up outpatient abdominal CT with IV contrast.      Electronically signed by: Pascual Chow  Date:    04/03/2024  Time:    22:39               Narrative:    EXAMINATION:  CTA CHEST NON CORONARY (PE STUDIES)    CLINICAL HISTORY:  Pulmonary embolism (PE) suspected, high prob;CP, SOB, tachy, Breast CA on chemo;    TECHNIQUE:  Low dose axial images, sagittal and coronal reformations were obtained from the thoracic inlet to the lung bases following the IV administration of 100 mL of Omnipaque 350.  Contrast timing was optimized to evaluate the pulmonary arteries.  MIP images were performed.    COMPARISON:  No prior chest CT or CTA.    AP chest x-ray 04/03/2024.    PA and lateral chest x-ray 02/23/2024.    FINDINGS:  Artifacts related to motion and/or beam hardening degrade portions of the scan.    THORACIC INLET: Visualized portions of the thyroid gland demonstrate no discrete masses.    AIRWAYS: The thoracic trachea and visualized central bronchi are patent.    THORACIC ESOPHAGUS: Normal course and caliber.  Small hiatal hernia.    THORACIC AORTA: Allowing for the artifacts, which is specially degrade images through the aortic root and ascending aorta, the thoracic aorta demonstrates a normal course and caliber, and no acute abnormality.    PULMONARY ARTERIES: Allowing for the artifacts, no discrete filling defects suspicious for pulmonary thromboemboli are demonstrated.    MEDIASTINUM/AV: No discrete mediastinal or hilar mass or lymphadenopathy (by size criteria).    HEART/PERICARDIUM: The tip of a partially visualized left jugular approach upper anterior chest wall infusion port terminates in the right atrium.  Normal-sized cardiac silhouette.  No pericardial effusion.  Engorged  appearance of the azygous arch.  Otherwise there is no CT evidence of right heart strain.    LUNGS: The artifacts could diminish sensitivity for detection of small pulmonary nodules, mild interstitial changes, or other small pulmonary abnormalities.  Allowing for this, there is no consolidation or discrete pulmonary mass.    A calcified granuloma is questioned in the right lower lobe (motion artifact limits more definitive assessment).    PLEURA: No pneumothorax or pleural fluid.    UPPER ABDOMEN: Borderline periportal lymphadenopathy is questioned but is poorly delineated on these arterial phase images.    Right adrenal gland normal.    Left adrenal gland not fully visualized.    The imaged portion of the stomach demonstrates very poor luminal distention, limiting CT assessment for gastric mural or mucosal thickening.    No free intraperitoneal air or fluid in the visualized upper abdomen.    Splenic flexure diverticulosis coli, without CT evidence of acute diverticulitis.    There are some air-fluid levels within nondilated left upper quadrant small bowel, significance uncertain.    Visualized portions of the gallbladder, pancreas, liver, spleen, and kidneys demonstrate no discrete masses.    BODY WALL: Visualized axillary and supraclavicular soft tissues demonstrate no lymphadenopathy, by size criteria.    MUSCULOSKELETAL: No acute CT abnormality or advanced degenerative changes in the visualized skeleton.     IMAGES: No additional contributory findings.                                       X-Ray Chest AP Portable (Final result)  Result time 04/03/24 20:31:04      Final result by Shelton Blakely DO (04/03/24 20:31:04)                   Impression:      No acute abnormality.      Electronically signed by: Shelton Blakely  Date:    04/03/2024  Time:    20:31               Narrative:    EXAMINATION:  XR CHEST AP PORTABLE    CLINICAL HISTORY:  SOB, CP;    TECHNIQUE:  Single frontal view of the chest was  performed.    COMPARISON:  02/23/2024.    FINDINGS:  There is a chest wall port, unchanged in position.  The lungs are well expanded and clear. No focal opacities are seen. The pleural spaces are clear. The cardiac silhouette is unremarkable. The visualized osseous structures are unremarkable.                                        ED Course     The patient received the following medications:  Medications   sodium chloride 0.9% bolus 1,000 mL 1,000 mL (0 mLs Intravenous Stopped 4/3/24 2120)   iohexoL (OMNIPAQUE 350) injection 100 mL (100 mLs Intravenous Given 4/3/24 2200)         ED Course as of 04/03/24 2342   Wed Apr 03, 2024 2025 Sinus tachycardia rate of 119 with nonspecific ST abnormalities, normal axis, intervals are not remarkable. [BA]      ED Course User Index  [BA] Inder Brown MD        Medical Decision Making     Unclear etiology for symptoms but no findings on workup indicating need for admission or emergent specialist consultation. She will follow up with her PCP and oncologist.          Diagnoses       ICD-10-CM ICD-9-CM   1. Shortness of breath  R06.02 786.05   2. Chest pain  R07.9 786.50   3. Tachycardia  R00.0 785.0         Dispostion      ED Disposition Condition    Discharge Stable            ED Prescriptions    None         Follow-up Information       Follow up With Specialties Details Why Contact Info    Davis Hanson MD Internal Medicine  Schedule a close ER follow-up visit. [address] [facility] [facility]  [address]  [facility] [address]  [phone]      ER   Return to the ER if your condition worsens or if you have any other concerns that you feel need immediate attention.

## 2024-04-05 ENCOUNTER — PATIENT MESSAGE (OUTPATIENT)
Dept: CARDIOLOGY | Facility: CLINIC | Age: 39
End: 2024-04-05
Payer: COMMERCIAL

## 2024-04-05 ENCOUNTER — PATIENT MESSAGE (OUTPATIENT)
Dept: ADMINISTRATIVE | Facility: OTHER | Age: 39
End: 2024-04-05
Payer: COMMERCIAL

## 2024-04-05 RX ORDER — METOPROLOL TARTRATE 25 MG/1
25 TABLET, FILM COATED ORAL 2 TIMES DAILY
Qty: 60 TABLET | Refills: 11 | Status: SHIPPED | OUTPATIENT
Start: 2024-04-05 | End: 2025-04-05

## 2024-04-06 ENCOUNTER — PATIENT MESSAGE (OUTPATIENT)
Dept: ADMINISTRATIVE | Facility: OTHER | Age: 39
End: 2024-04-06
Payer: COMMERCIAL

## 2024-04-07 ENCOUNTER — PATIENT MESSAGE (OUTPATIENT)
Dept: ADMINISTRATIVE | Facility: OTHER | Age: 39
End: 2024-04-07
Payer: COMMERCIAL

## 2024-04-08 ENCOUNTER — PATIENT MESSAGE (OUTPATIENT)
Dept: ADMINISTRATIVE | Facility: OTHER | Age: 39
End: 2024-04-08
Payer: COMMERCIAL

## 2024-04-08 ENCOUNTER — LAB VISIT (OUTPATIENT)
Dept: LAB | Facility: HOSPITAL | Age: 39
End: 2024-04-08
Attending: NURSE PRACTITIONER
Payer: COMMERCIAL

## 2024-04-08 ENCOUNTER — TELEPHONE (OUTPATIENT)
Dept: HEMATOLOGY/ONCOLOGY | Facility: CLINIC | Age: 39
End: 2024-04-08
Payer: COMMERCIAL

## 2024-04-08 DIAGNOSIS — R19.7 DIARRHEA, UNSPECIFIED TYPE: Primary | ICD-10-CM

## 2024-04-08 DIAGNOSIS — R19.7 DIARRHEA, UNSPECIFIED TYPE: ICD-10-CM

## 2024-04-08 LAB
C DIFF GDH STL QL: NEGATIVE
C DIFF TOX A+B STL QL IA: NEGATIVE

## 2024-04-08 PROCEDURE — 87324 CLOSTRIDIUM AG IA: CPT | Performed by: NURSE PRACTITIONER

## 2024-04-09 ENCOUNTER — PATIENT MESSAGE (OUTPATIENT)
Dept: ADMINISTRATIVE | Facility: OTHER | Age: 39
End: 2024-04-09
Payer: COMMERCIAL

## 2024-04-11 ENCOUNTER — PATIENT MESSAGE (OUTPATIENT)
Dept: ADMINISTRATIVE | Facility: OTHER | Age: 39
End: 2024-04-11
Payer: COMMERCIAL

## 2024-04-12 ENCOUNTER — PATIENT MESSAGE (OUTPATIENT)
Dept: ADMINISTRATIVE | Facility: OTHER | Age: 39
End: 2024-04-12
Payer: COMMERCIAL

## 2024-04-14 ENCOUNTER — PATIENT MESSAGE (OUTPATIENT)
Dept: ADMINISTRATIVE | Facility: OTHER | Age: 39
End: 2024-04-14
Payer: COMMERCIAL

## 2024-04-15 ENCOUNTER — PATIENT MESSAGE (OUTPATIENT)
Dept: ADMINISTRATIVE | Facility: OTHER | Age: 39
End: 2024-04-15
Payer: COMMERCIAL

## 2024-04-16 ENCOUNTER — PATIENT MESSAGE (OUTPATIENT)
Dept: ADMINISTRATIVE | Facility: OTHER | Age: 39
End: 2024-04-16
Payer: COMMERCIAL

## 2024-04-16 NOTE — PROGRESS NOTES
The patient location is: [place]  The chief complaint leading to consultation is: diarrhea from chemotherapy     Visit type: audiovisual    Face to Face time with patient: 22 minutes   30 minutes of total time spent on the encounter, which includes face to face time and non-face to face time preparing to see the patient (eg, review of tests), Obtaining and/or reviewing separately obtained history, Documenting clinical information in the electronic or other health record, Independently interpreting results (not separately reported) and communicating results to the patient/family/caregiver, or Care coordination (not separately reported).     Each patient to whom he or she provides medical services by telemedicine is:  (1) informed of the relationship between the physician and patient and the respective role of any other health care provider with respect to management of the patient; and (2) notified that he or she may decline to receive medical services by telemedicine and may withdraw from such care at any time.    Oncology Nutrition Assessment for Medical Nutrition Therapy  Initial Visit    Madhu Saucedo Jose De Jesus   1985    Referring Provider:  Dubinsky, Joanna L., PA*      Reason for Visit: Pt in for education and nutrition counseling     PMHx:   Past Medical History:   Diagnosis Date    Esophageal reflux     Migraine        Nutrition Assessment    Patient is a 38 y.o.female with recently diagnosed breast cancer. Currently on neoadjuvant TCHP. Referred to nutrition through integrative oncology.   She reports diarrhea is her main concern. She is having up to 8-9 episodes per day for the first two weeks following chemotherapy. She was checked for C.Diff which was negative. The week before going back for treatment she is noticing fluid retention, particularly in her legs. She works as a nurse and is frustrated by lack of answers/resolutions to her symptoms.    She denies any dehydration. Drinks 5-6 bottles of  "water per day, some fruit juice. Limits caffeine to once per day. Steroids make her hungry, but reports everything she eats goes right through her.   Taking probiotic BID, lomotil and imodium on schedule.     Weight:98 kg (216 lb) (may be inaccurate due to fluid)  Height:5' 4" (1.626 m)  BMI:Body mass index is 37.08 kg/m².   IBW: Ideal body weight: 54.7 kg (120 lb 9.5 oz)  Adjusted ideal body weight: 72 kg (158 lb 12.1 oz)    Allergies: Pertuzumab and Trastuzumab    Current Medications:    Current Outpatient Medications:     dexAMETHasone (DECADRON) 4 MG Tab, Take 2 tablets by mouth daily on days 2-4 of each chemotherapy cycle., Disp: 6 tablet, Rfl: 5    diphenoxylate-atropine 2.5-0.025 mg (LOMOTIL) 2.5-0.025 mg per tablet, Take 1 tablet by mouth 4 (four) times daily as needed for Diarrhea., Disp: 60 tablet, Rfl: 1    duke's soln (benadryl 30 mL, mylanta 30 mL, LIDOcaine 30 mL, nystatin 30 mL) 120mL, Take 10 mLs by mouth 4 (four) times daily. Swish and swallow, Disp: 120 mL, Rfl: 3    ergocalciferol, vitamin D2, (VITAMIN D ORAL), , Disp: , Rfl:     furosemide (LASIX) 20 MG tablet, Take 1 tablet (20 mg total) by mouth once daily., Disp: 5 tablet, Rfl: 0    LACTOBACILLUS ACIDOPHILUS ORAL, , Disp: , Rfl:     LIDOcaine-prilocaine (EMLA) cream, Apply topically as needed (apply 30-60 minutes prior to chemotherapy)., Disp: 25 g, Rfl: 1    metoprolol tartrate (LOPRESSOR) 25 MG tablet, Take 1 tablet (25 mg total) by mouth 2 (two) times daily., Disp: 60 tablet, Rfl: 11    OLANZapine (ZYPREXA) 5 MG tablet, Take 1 tablet by mouth nightly on days 1-4 of each chemotherapy cycle., Disp: 4 tablet, Rfl: 5    omega 3-dha-epa-fish oil 1,200 (144-216) mg Cap, , Disp: , Rfl:     ondansetron (ZOFRAN) 4 MG tablet, , Disp: , Rfl:     phenazopyridine (PYRIDIUM) 100 MG tablet, Take 1 tablet (100 mg total) by mouth 3 (three) times daily as needed for Pain., Disp: 30 tablet, Rfl: 1    prochlorperazine (COMPAZINE) 5 MG tablet, Take 2 tablets " (10 mg total) by mouth every 6 (six) hours as needed for Nausea., Disp: 20 tablet, Rfl: 5    Current Facility-Administered Medications:     copper intrauterine device 380 square mm 380 mm IUD, 380 mm, Intrauterine, , Dorina Cullen MD, 380 mm at 01/25/24 0945    Vitamins/Supplements: probiotic, vitamin D, magnesium (not daily due to diarrhea)     Labs: Reviewed from 4/3    Nutrition Diagnosis    Problem: altered GI function  Etiology (related to):  chemotherapy   Signs/Symptoms (as evidenced by):  frequent diarrhea     Nutrition Intervention    Nutrition Prescription   1950 Kcals (20kcal/kg)  98 g protein (1g/kg)   2900 mL fluid (30mL/kg)    Recommendations:  Try metamucil capsules with meals   -start with one TID and titrate up following package directions   Enterade BID x4-7 days then 1 per day maintenance if Metamucil doesn't work   Avoid concentrated sugars, artificial sweeteners   One liquid IV per day in divided (2 doses) when having diarrhea   Soluble fiber- oatmeal, bananas, sweet potatoes, rice, strawberries, carrots/root vegetables, apples/applesauce, jumbo marshmallows   Try yogurt once per day as well   Minimize magnesium supplements and avoid fish oil for now     Nutrition Monitoring and Evaluation    Monitor: diet tolerance , diet education needs , and symptom management     Goals: improved frequency/severity of diarrhea     Follow up Patient will follow up via portal as needed with any questions/concerns     Communication to referring provider/care team: note available in chart     Counseling time: 15 Minutes    Idalmis Carson, MPH, RD, , LDN, FAND  Board Certified Specialist in Oncology Nutrition   [phone]

## 2024-04-17 ENCOUNTER — CLINICAL SUPPORT (OUTPATIENT)
Dept: HEMATOLOGY/ONCOLOGY | Facility: CLINIC | Age: 39
End: 2024-04-17
Payer: COMMERCIAL

## 2024-04-17 ENCOUNTER — PATIENT MESSAGE (OUTPATIENT)
Dept: ADMINISTRATIVE | Facility: OTHER | Age: 39
End: 2024-04-17
Payer: COMMERCIAL

## 2024-04-17 ENCOUNTER — PATIENT MESSAGE (OUTPATIENT)
Dept: HEMATOLOGY/ONCOLOGY | Facility: CLINIC | Age: 39
End: 2024-04-17
Payer: COMMERCIAL

## 2024-04-17 VITALS — HEIGHT: 64 IN | WEIGHT: 216 LBS | BODY MASS INDEX: 36.88 KG/M2

## 2024-04-17 DIAGNOSIS — Z71.3 NUTRITIONAL COUNSELING: Primary | ICD-10-CM

## 2024-04-17 DIAGNOSIS — R19.7 DIARRHEA, UNSPECIFIED TYPE: ICD-10-CM

## 2024-04-17 DIAGNOSIS — C50.411 MALIGNANT NEOPLASM OF UPPER-OUTER QUADRANT OF RIGHT BREAST IN FEMALE, ESTROGEN RECEPTOR POSITIVE: ICD-10-CM

## 2024-04-17 DIAGNOSIS — C50.919 BREAST CANCER: ICD-10-CM

## 2024-04-17 DIAGNOSIS — Z17.0 MALIGNANT NEOPLASM OF UPPER-OUTER QUADRANT OF RIGHT BREAST IN FEMALE, ESTROGEN RECEPTOR POSITIVE: ICD-10-CM

## 2024-04-17 PROCEDURE — 97802 MEDICAL NUTRITION INDIV IN: CPT | Mod: 95,,, | Performed by: DIETITIAN, REGISTERED

## 2024-04-18 ENCOUNTER — LAB VISIT (OUTPATIENT)
Dept: LAB | Facility: HOSPITAL | Age: 39
End: 2024-04-18
Attending: STUDENT IN AN ORGANIZED HEALTH CARE EDUCATION/TRAINING PROGRAM
Payer: COMMERCIAL

## 2024-04-18 ENCOUNTER — PATIENT MESSAGE (OUTPATIENT)
Dept: ADMINISTRATIVE | Facility: OTHER | Age: 39
End: 2024-04-18
Payer: COMMERCIAL

## 2024-04-18 DIAGNOSIS — C50.411 MALIGNANT NEOPLASM OF UPPER-OUTER QUADRANT OF RIGHT BREAST IN FEMALE, ESTROGEN RECEPTOR POSITIVE: ICD-10-CM

## 2024-04-18 DIAGNOSIS — Z17.0 MALIGNANT NEOPLASM OF UPPER-OUTER QUADRANT OF RIGHT BREAST IN FEMALE, ESTROGEN RECEPTOR POSITIVE: ICD-10-CM

## 2024-04-18 LAB
ALBUMIN SERPL BCP-MCNC: 3.1 G/DL (ref 3.5–5.2)
ALP SERPL-CCNC: 67 U/L (ref 55–135)
ALT SERPL W/O P-5'-P-CCNC: 21 U/L (ref 10–44)
ANION GAP SERPL CALC-SCNC: 6 MMOL/L (ref 8–16)
AST SERPL-CCNC: 18 U/L (ref 10–40)
BASOPHILS # BLD AUTO: 0.01 K/UL (ref 0–0.2)
BASOPHILS NFR BLD: 0.2 % (ref 0–1.9)
BILIRUB SERPL-MCNC: 0.3 MG/DL (ref 0.1–1)
BUN SERPL-MCNC: 12 MG/DL (ref 6–20)
CALCIUM SERPL-MCNC: 8.7 MG/DL (ref 8.7–10.5)
CHLORIDE SERPL-SCNC: 107 MMOL/L (ref 95–110)
CO2 SERPL-SCNC: 27 MMOL/L (ref 23–29)
CREAT SERPL-MCNC: 0.7 MG/DL (ref 0.5–1.4)
DIFFERENTIAL METHOD BLD: ABNORMAL
EOSINOPHIL # BLD AUTO: 0 K/UL (ref 0–0.5)
EOSINOPHIL NFR BLD: 0.2 % (ref 0–8)
ERYTHROCYTE [DISTWIDTH] IN BLOOD BY AUTOMATED COUNT: 16.9 % (ref 11.5–14.5)
EST. GFR  (NO RACE VARIABLE): >60 ML/MIN/1.73 M^2
GLUCOSE SERPL-MCNC: 91 MG/DL (ref 70–110)
HCG INTACT+B SERPL-ACNC: <2.4 MIU/ML
HCT VFR BLD AUTO: 32.9 % (ref 37–48.5)
HGB BLD-MCNC: 10.5 G/DL (ref 12–16)
IMM GRANULOCYTES # BLD AUTO: 0.01 K/UL (ref 0–0.04)
IMM GRANULOCYTES NFR BLD AUTO: 0.2 % (ref 0–0.5)
LYMPHOCYTES # BLD AUTO: 2.3 K/UL (ref 1–4.8)
LYMPHOCYTES NFR BLD: 42.6 % (ref 18–48)
MCH RBC QN AUTO: 30.2 PG (ref 27–31)
MCHC RBC AUTO-ENTMCNC: 31.9 G/DL (ref 32–36)
MCV RBC AUTO: 95 FL (ref 82–98)
MONOCYTES # BLD AUTO: 0.8 K/UL (ref 0.3–1)
MONOCYTES NFR BLD: 15.9 % (ref 4–15)
NEUTROPHILS # BLD AUTO: 2.2 K/UL (ref 1.8–7.7)
NEUTROPHILS NFR BLD: 40.9 % (ref 38–73)
NRBC BLD-RTO: 0 /100 WBC
PLATELET # BLD AUTO: 224 K/UL (ref 150–450)
PMV BLD AUTO: 9 FL (ref 9.2–12.9)
POTASSIUM SERPL-SCNC: 4.2 MMOL/L (ref 3.5–5.1)
PROT SERPL-MCNC: 5.8 G/DL (ref 6–8.4)
RBC # BLD AUTO: 3.48 M/UL (ref 4–5.4)
SODIUM SERPL-SCNC: 140 MMOL/L (ref 136–145)
WBC # BLD AUTO: 5.28 K/UL (ref 3.9–12.7)

## 2024-04-18 PROCEDURE — 80053 COMPREHEN METABOLIC PANEL: CPT | Performed by: STUDENT IN AN ORGANIZED HEALTH CARE EDUCATION/TRAINING PROGRAM

## 2024-04-18 PROCEDURE — 85025 COMPLETE CBC W/AUTO DIFF WBC: CPT | Performed by: STUDENT IN AN ORGANIZED HEALTH CARE EDUCATION/TRAINING PROGRAM

## 2024-04-18 PROCEDURE — 36415 COLL VENOUS BLD VENIPUNCTURE: CPT | Mod: PN | Performed by: STUDENT IN AN ORGANIZED HEALTH CARE EDUCATION/TRAINING PROGRAM

## 2024-04-18 PROCEDURE — 84702 CHORIONIC GONADOTROPIN TEST: CPT | Performed by: STUDENT IN AN ORGANIZED HEALTH CARE EDUCATION/TRAINING PROGRAM

## 2024-04-18 NOTE — PROGRESS NOTES
[facility]/ [facility]      Chief Complaint:   HR+/Her2+ breast cancer      Cancer Staging   Malignant neoplasm of upper-outer quadrant of right breast in female, estrogen receptor positive  Staging form: Breast, AJCC 8th Edition  - Clinical stage from 2024: Stage IB (cT2, cN0, cM0, G3, ER+, OR+, HER2+) - Signed by Kassie Blakely MD on 2024    HPI:  Madhu Dela Cruz is a 39yo woman who presents today for evaluation of newly diagnosed breast cancer. Oncologic history below:     -2023 Screening mammogram with no evidence of malignancy   -2024 Diagnostic mammogram (palpable right breast mass) with  2.2 x 1.8 x 2.1 cm right breast mass   -2024 Needle biopsy of right breast with IDC grade 3, 8mm in greatest dimension, %, OR 40%, HER2 3+, Ki-67 >90%.  -24 Breast MRI with right breast 2.4 cm x 2.1 cm x 2.3 cm mass at the 3 o'clock position. A smaller benign appearing right breast mass. Normal axillary lymph nodes.   -started neoadjuvant TCHP 2024    Gyn History:   Menarche: Age 10   Menopause: Pre    Age at first pregnancy: 15  : 2nd child, not first  HRT: Was on hormonal birth control, stopped   Does not desire fertility preservation.   Family History: Maternal aunt, diagnosed later in life. No ovarian cancer.     She lives in [facility] with her , they run a photo lubin business.      Interval history: Presents for Cycle 4 of TCHP. She is drained over the last few days. She feels fatigued and body aches. Denies fever and chills. Tearful today during exams.   She is still have diarrhea, she has met with dietary who has given her some recommendations to try. Rare nausea.   Bilateral swelling in both legs over the last few days.  Notes the metoprolol did decrease her heart rate.   Appetite is good. She is keeping up with her hydration.   Occasional peripheral neuropathy, in her  "feet still resolves.       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    diphenoxylate-atropine 2.5-0.025 mg (LOMOTIL) 2.5-0.025 mg per tablet 1 tablet, Oral, 4 times daily PRN    azar's soln (benadryl 30 mL, mylanta 30 mL, LIDOcaine 30 mL, nystatin 30 mL) 120mL 10 mLs, Oral, 4 times daily, Swish and swallow    ergocalciferol, vitamin D2, (VITAMIN D ORAL) No dose, route, or frequency recorded.    furosemide (LASIX) 20 mg, Oral, Daily    LACTOBACILLUS ACIDOPHILUS ORAL No dose, route, or frequency recorded.    LIDOcaine-prilocaine (EMLA) cream Topical (Top), As needed (PRN)    metoprolol tartrate (LOPRESSOR) 25 mg, Oral, 2 times daily    OLANZapine (ZYPREXA) 5 MG tablet Take 1 tablet by mouth nightly on days 1-4 of each chemotherapy cycle.    omega 3-dha-epa-fish oil 1,200 (144-216) mg Cap No dose, route, or frequency recorded.    ondansetron (ZOFRAN) 4 MG tablet No dose, route, or frequency recorded.    phenazopyridine (PYRIDIUM) 100 mg, Oral, 3 times daily PRN    prochlorperazine (COMPAZINE) 10 mg, Oral, Every 6 hours PRN       Review of Systems:   seeabove        PHYSICAL EXAM:  /60 (BP Location: Right arm, Patient Position: Sitting, BP Method: Medium (Automatic))   Pulse 88   Temp 98.9 °F (37.2 °C) (Oral)   Resp 18   Ht 5' 4" (1.626 m)   Wt 102.7 kg (226 lb 6.6 oz)   LMP  (LMP Unknown)   SpO2 97%   BMI 38.86 kg/m²     ECOG 0  General: well appearing, in no apparent distress  HEENT: Normocephalic, EOMI, anicteric sclerae, MMM  Neck: supple, without cervical or supraclavicular lymphadenopathy.  Heart: regular rate and rhythm, normal S1 and S2, no murmurs, gallops or rubs.  Lungs: Clear to " auscultation bilaterally, no increased wob  Breast: difficult to appreciate Rt breast mass at 3oclock  Abdomen: Soft, nontender, nondistended   Extremities: Mild LE edema or  no joint effusion  Skin: warm, well-perfused, no rash  Neurologic: Alert and oriented x 4, normal speech and gait   Psychiatric: Conversing appropriately with providers throughout today's encounter.    Pertinent Labs & Imaging:  Reviewed recent labs, imaging and pathology.     Assessment & Plan:  Ms. Dela Cruz is a cuco 39yo woman with recently diagnosed cT2N0 HR+/Her2+ breast cancer who presents today for follow up.     Given that her tumor is as least T2N0, and that she is otherwise healthy, have proceeded with neoadjuvant treatment with TCHP. Previously reviewed dosing, schedule and potential side effects. She is now s/p C1 and tolerating fairly well thus far.    #HR+/Her2+ breast cancer:  --labs reviewed and ok to proceed with C4 as scheduled - will dose reduce to 60mg /m2 of taxotere due to diarrhea  --moved premedications before herceptin/perjeta and slowed rate given rigors w cycle 1   -- BRCA testing negative (no pathologic mutations, had to VUS in met and pten genes)  --s/p referral to integrative onc  --RTC in 3 weeks as schedued       #Chemo-induced diarrhea: stable on imodium and lomotil  --encouraged po intake  --cont imodium and lomotil prn  -- discussed psyllium bulking agent   --met with dietician will add what was recommended      #Thrush, hoarseness: resolved  --has duke's soln if needed      #Normocytic anemia: likely 2/2 chemo, pt reports recently finishing heavy menstrual cycle  --will cont to monitor     All questions were answered to her apparent satisfaction. Will see her back in 3 weeks or sooner should the need arise.    Return to clinic in 3 weeks with MD appointment and labs.     Patient is in agreement with the proposed treatment plan. All questions were answered to the patient's satisfaction. Patient knows to call  clinic for any new or worsening symptoms and if anything is needed before the next clinic visit.          Paulina Rivera, FNP-C  Hematology & Medical Oncology   [facility] [place] [address]  ph. [phone]  Fax. [phone]    Collaborating physician, Dr. Blakely.    Approximately 15 minutes were spent face-to-face with the patient.  Approximately 25 minutes in total were spent on this encounter, which includes face-to-face time and non-face-to-face time preparing to see the patient (e.g., review of tests), obtaining and/or reviewing separately obtained history, documenting clinical information in the electronic or other health record, independently interpreting results (not separately reported) and communicating results to the patient/family/caregiver, or care coordination (not separately reported).       Route Chart for Scheduling    Med Onc Chart Routing      Follow up with physician    Follow up with GILBERTO 3 weeks. already scheduled   Infusion scheduling note    Injection scheduling note    Labs    Imaging    Pharmacy appointment    Other referrals                  Treatment Plan Information   OP BREAST TCHP (pertuzumab trastuzumab DOCEtaxel CARBOplatin) Q3W   Kassie Blakely MD   Upcoming Treatment Dates - OP BREAST TCHP (pertuzumab trastuzumab DOCEtaxel CARBOplatin) Q3W    4/18/2024       Chemotherapy       pertuzumab (PERJETA) 420 mg in sodium chloride 0.9% 264 mL infusion       trastuzumab-anns (KANJINTI) 560 mg in sodium chloride 0.9% 250 mL chemo infusion       DOCEtaxel (TAXOTERE) 75 mg/m2 = 154 mg in sodium chloride 0.9% 257.7 mL chemo infusion       CARBOplatin (PARAPLATIN) 900 mg in sodium chloride 0.9% 340 mL chemo infusion       Supportive Care       prochlorperazine injection 10 mg       meperidine injection 25 mg       Antiemetics       fosaprepitant 150 mg in sodium chloride 0.9% 150 mL IVPB       famotidine (PF) injection 20 mg       palonosetron 0.25mg/dexAMETHasone 20mg  in NS IVPB 0.25 mg 50 mL       diphenhydrAMINE (BENADRYL) 50 mg in NS 50 mL IVPB  4/19/2024       Growth Factor       pegfilgrastim-cbqv (UDENYCA) injection 6 mg  5/9/2024       Chemotherapy       pertuzumab (PERJETA) 420 mg in sodium chloride 0.9% 264 mL infusion       trastuzumab-anns (KANJINTI) 560 mg in sodium chloride 0.9% 250 mL chemo infusion       DOCEtaxel (TAXOTERE) 75 mg/m2 = 154 mg in sodium chloride 0.9% 257.7 mL chemo infusion       CARBOplatin (PARAPLATIN) 900 mg in sodium chloride 0.9% 340 mL chemo infusion       Supportive Care       prochlorperazine injection 10 mg       meperidine injection 25 mg       Antiemetics       fosaprepitant 150 mg in sodium chloride 0.9% 150 mL IVPB       famotidine (PF) injection 20 mg       palonosetron 0.25mg/dexAMETHasone 20mg in NS IVPB 0.25 mg 50 mL       diphenhydrAMINE (BENADRYL) 50 mg in NS 50 mL IVPB  5/10/2024       Growth Factor       pegfilgrastim-cbqv (UDENYCA) injection 6 mg    Supportive Plan Information  IV FLUIDS AND ELECTROLYTES   Kassie Blakely MD   Upcoming Treatment Dates - IV FLUIDS AND ELECTROLYTES    No upcoming days in selected categories.      MDM includes  :    - Acute or chronic illness or injury that poses a threat to life or bodily function  - Review of prior external notes from unique source  - Independent review and explanation of 3+ results from unique tests  - Discussion of management and ordering 3+ unique tests  - Extensive discussion of treatment and management  - Prescription drug management  - Drug therapy requiring intensive monitoring for toxicity

## 2024-04-19 ENCOUNTER — INFUSION (OUTPATIENT)
Dept: INFUSION THERAPY | Facility: HOSPITAL | Age: 39
End: 2024-04-19
Attending: INTERNAL MEDICINE
Payer: COMMERCIAL

## 2024-04-19 ENCOUNTER — OFFICE VISIT (OUTPATIENT)
Dept: HEMATOLOGY/ONCOLOGY | Facility: CLINIC | Age: 39
End: 2024-04-19
Payer: COMMERCIAL

## 2024-04-19 VITALS — SYSTOLIC BLOOD PRESSURE: 139 MMHG | HEART RATE: 88 BPM | DIASTOLIC BLOOD PRESSURE: 78 MMHG | RESPIRATION RATE: 18 BRPM

## 2024-04-19 VITALS
DIASTOLIC BLOOD PRESSURE: 60 MMHG | BODY MASS INDEX: 38.66 KG/M2 | TEMPERATURE: 99 F | HEART RATE: 88 BPM | SYSTOLIC BLOOD PRESSURE: 128 MMHG | HEIGHT: 64 IN | OXYGEN SATURATION: 97 % | WEIGHT: 226.44 LBS | RESPIRATION RATE: 18 BRPM

## 2024-04-19 DIAGNOSIS — R60.0 LEG EDEMA: ICD-10-CM

## 2024-04-19 DIAGNOSIS — K52.1 CHEMOTHERAPY INDUCED DIARRHEA: ICD-10-CM

## 2024-04-19 DIAGNOSIS — Z17.0 MALIGNANT NEOPLASM OF UPPER-OUTER QUADRANT OF RIGHT BREAST IN FEMALE, ESTROGEN RECEPTOR POSITIVE: Primary | ICD-10-CM

## 2024-04-19 DIAGNOSIS — C50.411 MALIGNANT NEOPLASM OF UPPER-OUTER QUADRANT OF RIGHT BREAST IN FEMALE, ESTROGEN RECEPTOR POSITIVE: Primary | ICD-10-CM

## 2024-04-19 DIAGNOSIS — T45.1X5A CHEMOTHERAPY INDUCED DIARRHEA: ICD-10-CM

## 2024-04-19 PROCEDURE — 1159F MED LIST DOCD IN RCRD: CPT | Mod: CPTII,S$GLB,, | Performed by: NURSE PRACTITIONER

## 2024-04-19 PROCEDURE — A4216 STERILE WATER/SALINE, 10 ML: HCPCS | Performed by: NURSE PRACTITIONER

## 2024-04-19 PROCEDURE — 3078F DIAST BP <80 MM HG: CPT | Mod: CPTII,S$GLB,, | Performed by: NURSE PRACTITIONER

## 2024-04-19 PROCEDURE — 25000003 PHARM REV CODE 250: Performed by: NURSE PRACTITIONER

## 2024-04-19 PROCEDURE — 96417 CHEMO IV INFUS EACH ADDL SEQ: CPT

## 2024-04-19 PROCEDURE — 3008F BODY MASS INDEX DOCD: CPT | Mod: CPTII,S$GLB,, | Performed by: NURSE PRACTITIONER

## 2024-04-19 PROCEDURE — 3074F SYST BP LT 130 MM HG: CPT | Mod: CPTII,S$GLB,, | Performed by: NURSE PRACTITIONER

## 2024-04-19 PROCEDURE — 96413 CHEMO IV INFUSION 1 HR: CPT

## 2024-04-19 PROCEDURE — 1160F RVW MEDS BY RX/DR IN RCRD: CPT | Mod: CPTII,S$GLB,, | Performed by: NURSE PRACTITIONER

## 2024-04-19 PROCEDURE — 96375 TX/PRO/DX INJ NEW DRUG ADDON: CPT

## 2024-04-19 PROCEDURE — G2211 COMPLEX E/M VISIT ADD ON: HCPCS | Mod: S$GLB,,, | Performed by: NURSE PRACTITIONER

## 2024-04-19 PROCEDURE — 99215 OFFICE O/P EST HI 40 MIN: CPT | Mod: S$GLB,,, | Performed by: NURSE PRACTITIONER

## 2024-04-19 PROCEDURE — 96367 TX/PROPH/DG ADDL SEQ IV INF: CPT

## 2024-04-19 PROCEDURE — 99999 PR PBB SHADOW E&M-EST. PATIENT-LVL IV: CPT | Mod: PBBFAC,,, | Performed by: NURSE PRACTITIONER

## 2024-04-19 PROCEDURE — 63600175 PHARM REV CODE 636 W HCPCS: Performed by: NURSE PRACTITIONER

## 2024-04-19 RX ORDER — DIPHENHYDRAMINE HYDROCHLORIDE 50 MG/ML
50 INJECTION INTRAMUSCULAR; INTRAVENOUS ONCE AS NEEDED
Status: CANCELLED | OUTPATIENT
Start: 2024-04-19

## 2024-04-19 RX ORDER — HEPARIN 100 UNIT/ML
500 SYRINGE INTRAVENOUS
Status: DISCONTINUED | OUTPATIENT
Start: 2024-04-19 | End: 2024-04-19 | Stop reason: HOSPADM

## 2024-04-19 RX ORDER — EPINEPHRINE 0.3 MG/.3ML
0.3 INJECTION SUBCUTANEOUS ONCE AS NEEDED
Status: CANCELLED | OUTPATIENT
Start: 2024-04-19

## 2024-04-19 RX ORDER — FUROSEMIDE 20 MG/1
20 TABLET ORAL DAILY
Qty: 5 TABLET | Refills: 0 | Status: SHIPPED | OUTPATIENT
Start: 2024-04-19 | End: 2024-05-02 | Stop reason: SDUPTHER

## 2024-04-19 RX ORDER — HEPARIN 100 UNIT/ML
500 SYRINGE INTRAVENOUS
Status: CANCELLED | OUTPATIENT
Start: 2024-04-19

## 2024-04-19 RX ORDER — FAMOTIDINE 10 MG/ML
20 INJECTION INTRAVENOUS
Status: COMPLETED | OUTPATIENT
Start: 2024-04-19 | End: 2024-04-19

## 2024-04-19 RX ORDER — FAMOTIDINE 10 MG/ML
20 INJECTION INTRAVENOUS
Status: CANCELLED
Start: 2024-04-19 | End: 2024-04-19

## 2024-04-19 RX ORDER — DIPHENHYDRAMINE HYDROCHLORIDE 50 MG/ML
50 INJECTION INTRAMUSCULAR; INTRAVENOUS ONCE AS NEEDED
Status: DISCONTINUED | OUTPATIENT
Start: 2024-04-19 | End: 2024-04-19 | Stop reason: HOSPADM

## 2024-04-19 RX ORDER — EPINEPHRINE 0.3 MG/.3ML
0.3 INJECTION SUBCUTANEOUS ONCE AS NEEDED
Status: DISCONTINUED | OUTPATIENT
Start: 2024-04-19 | End: 2024-04-19 | Stop reason: HOSPADM

## 2024-04-19 RX ORDER — MEPERIDINE HYDROCHLORIDE 50 MG/ML
25 INJECTION INTRAMUSCULAR; INTRAVENOUS; SUBCUTANEOUS ONCE AS NEEDED
Status: CANCELLED | OUTPATIENT
Start: 2024-04-19

## 2024-04-19 RX ORDER — MEPERIDINE HYDROCHLORIDE 50 MG/ML
25 INJECTION INTRAMUSCULAR; INTRAVENOUS; SUBCUTANEOUS ONCE AS NEEDED
Status: DISCONTINUED | OUTPATIENT
Start: 2024-04-19 | End: 2024-04-19 | Stop reason: HOSPADM

## 2024-04-19 RX ORDER — SODIUM CHLORIDE 0.9 % (FLUSH) 0.9 %
10 SYRINGE (ML) INJECTION
Status: CANCELLED | OUTPATIENT
Start: 2024-04-19

## 2024-04-19 RX ORDER — PROCHLORPERAZINE EDISYLATE 5 MG/ML
10 INJECTION INTRAMUSCULAR; INTRAVENOUS ONCE AS NEEDED
Status: CANCELLED | OUTPATIENT
Start: 2024-04-19

## 2024-04-19 RX ORDER — SODIUM CHLORIDE 0.9 % (FLUSH) 0.9 %
10 SYRINGE (ML) INJECTION
Status: DISCONTINUED | OUTPATIENT
Start: 2024-04-19 | End: 2024-04-19 | Stop reason: HOSPADM

## 2024-04-19 RX ORDER — PROCHLORPERAZINE EDISYLATE 5 MG/ML
10 INJECTION INTRAMUSCULAR; INTRAVENOUS ONCE AS NEEDED
Status: DISCONTINUED | OUTPATIENT
Start: 2024-04-19 | End: 2024-04-19 | Stop reason: HOSPADM

## 2024-04-19 RX ADMIN — DEXAMETHASONE SODIUM PHOSPHATE 0.25 MG: 4 INJECTION, SOLUTION INTRA-ARTICULAR; INTRALESIONAL; INTRAMUSCULAR; INTRAVENOUS; SOFT TISSUE at 01:04

## 2024-04-19 RX ADMIN — FAMOTIDINE 20 MG: 10 INJECTION INTRAVENOUS at 01:04

## 2024-04-19 RX ADMIN — CARBOPLATIN 900 MG: 10 INJECTION, SOLUTION INTRAVENOUS at 04:04

## 2024-04-19 RX ADMIN — DIPHENHYDRAMINE HYDROCHLORIDE 50 MG: 50 INJECTION, SOLUTION INTRAMUSCULAR; INTRAVENOUS at 12:04

## 2024-04-19 RX ADMIN — SODIUM CHLORIDE: 9 INJECTION, SOLUTION INTRAVENOUS at 12:04

## 2024-04-19 RX ADMIN — PERTUZUMAB 420 MG: 30 INJECTION, SOLUTION, CONCENTRATE INTRAVENOUS at 01:04

## 2024-04-19 RX ADMIN — DOCETAXEL ANHYDROUS 120 MG: 10 INJECTION, SOLUTION INTRAVENOUS at 03:04

## 2024-04-19 RX ADMIN — FOSAPREPITANT DIMEGLUMINE 150 MG: 150 INJECTION, POWDER, LYOPHILIZED, FOR SOLUTION INTRAVENOUS at 01:04

## 2024-04-19 RX ADMIN — Medication 10 ML: at 05:04

## 2024-04-19 RX ADMIN — TRASTUZUMAB-ANNS 560 MG: 420 INJECTION, POWDER, LYOPHILIZED, FOR SOLUTION INTRAVENOUS at 02:04

## 2024-04-19 RX ADMIN — HEPARIN 500 UNITS: 100 SYRINGE at 05:04

## 2024-04-19 NOTE — PLAN OF CARE
1719 Patient tolerated C4D1 Perjeta/ Kanjinti/ Taxotere/ Carboplatin without incident. Seen by Paulina Rivera NP prior to treatment today. Labs from 4/18 reviewed and within parameters for treatment. Vitals stable before, during, and after treatment. Port with brisk blood return and flushed without resistance before, during and after infusion, heparin locked and needle removed at discharge.  Pre-medicated per orders. Perjeta and Kanjinti both infused over 1 hour due to previous reaction. Patient declined 30 min observation period after Perjeta before Kanjinti. Patient aware of her injection appointment tomorrow. To contact provider with questions or concerns. D/C ambulatory and stable.

## 2024-04-20 ENCOUNTER — INFUSION (OUTPATIENT)
Dept: INFUSION THERAPY | Facility: HOSPITAL | Age: 39
End: 2024-04-20
Payer: COMMERCIAL

## 2024-04-20 ENCOUNTER — PATIENT MESSAGE (OUTPATIENT)
Dept: ADMINISTRATIVE | Facility: OTHER | Age: 39
End: 2024-04-20
Payer: COMMERCIAL

## 2024-04-20 VITALS — BODY MASS INDEX: 38.66 KG/M2 | HEIGHT: 64 IN | WEIGHT: 226.44 LBS

## 2024-04-20 DIAGNOSIS — Z17.0 MALIGNANT NEOPLASM OF UPPER-OUTER QUADRANT OF RIGHT BREAST IN FEMALE, ESTROGEN RECEPTOR POSITIVE: Primary | ICD-10-CM

## 2024-04-20 DIAGNOSIS — C50.411 MALIGNANT NEOPLASM OF UPPER-OUTER QUADRANT OF RIGHT BREAST IN FEMALE, ESTROGEN RECEPTOR POSITIVE: Primary | ICD-10-CM

## 2024-04-20 PROCEDURE — 63600175 PHARM REV CODE 636 W HCPCS: Mod: JZ,JG | Performed by: NURSE PRACTITIONER

## 2024-04-20 PROCEDURE — 96372 THER/PROPH/DIAG INJ SC/IM: CPT

## 2024-04-20 RX ADMIN — PEGFILGRASTIM-CBQV 6 MG: 6 INJECTION, SOLUTION SUBCUTANEOUS at 09:04

## 2024-04-20 NOTE — PLAN OF CARE
1000-Pt tolerated Udenyca well today, no complaints or complications. Pt aware to call provider with any questions or concerns and is aware of upcoming appts. Pt ambulatory from clinic with steady gait, no distress noted.

## 2024-04-21 ENCOUNTER — PATIENT MESSAGE (OUTPATIENT)
Dept: ADMINISTRATIVE | Facility: OTHER | Age: 39
End: 2024-04-21
Payer: COMMERCIAL

## 2024-04-22 ENCOUNTER — PATIENT MESSAGE (OUTPATIENT)
Dept: ADMINISTRATIVE | Facility: OTHER | Age: 39
End: 2024-04-22
Payer: COMMERCIAL

## 2024-04-23 ENCOUNTER — PATIENT MESSAGE (OUTPATIENT)
Dept: ADMINISTRATIVE | Facility: OTHER | Age: 39
End: 2024-04-23
Payer: COMMERCIAL

## 2024-04-24 ENCOUNTER — PATIENT MESSAGE (OUTPATIENT)
Dept: ADMINISTRATIVE | Facility: OTHER | Age: 39
End: 2024-04-24
Payer: COMMERCIAL

## 2024-04-26 NOTE — PROGRESS NOTES
[facility]/ [facility]      Chief Complaint:   HR+/Her2+ breast cancer      Cancer Staging   Malignant neoplasm of upper-outer quadrant of right breast in female, estrogen receptor positive  Staging form: Breast, AJCC 8th Edition  - Clinical stage from 2024: Stage IB (cT2, cN0, cM0, G3, ER+, KY+, HER2+) - Signed by Kassie Blakely MD on 2024    HPI:  Madhu Dela Cruz is a 39yo woman who presents today for evaluation of newly diagnosed breast cancer. Oncologic history below:     -2023 Screening mammogram with no evidence of malignancy   -2024 Diagnostic mammogram (palpable right breast mass) with  2.2 x 1.8 x 2.1 cm right breast mass   -2024 Needle biopsy of right breast with IDC grade 3, 8mm in greatest dimension, %, KY 40%, HER2 3+, Ki-67 >90%.  -24 Breast MRI with right breast 2.4 cm x 2.1 cm x 2.3 cm mass at the 3 o'clock position. A smaller benign appearing right breast mass. Normal axillary lymph nodes.   -started neoadjuvant TCHP 2024    Gyn History:   Menarche: Age 10   Menopause: Pre    Age at first pregnancy: 15  : 2nd child, not first  HRT: Was on hormonal birth control, stopped   Does not desire fertility preservation.   Family History: Maternal aunt, diagnosed later in life. No ovarian cancer.     She lives in [facility] with her , they run a photo lubin business.      Interval history: Presents for Cycle 5 of TCHP. Over all she is having generalized aching. Started in her legs and her back. She has tried tylenol without relief.   She is still feeling this.   Diarrhea is feeling better with dose reduced taxotere.   She is fatigued as well. Denies fever and chills.   Appetite is good, loose bowel movements. Minimal nausea, compazine is working.   Still with some bilateral swelling in the legs.   Notes the metoprolol did decrease her heart rate, 25 mg BID.      Occasional peripheral neuropathy.       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    diphenoxylate-atropine 2.5-0.025 mg (LOMOTIL) 2.5-0.025 mg per tablet 1 tablet, Oral, 4 times daily PRN    azar's soln (benadryl 30 mL, mylanta 30 mL, LIDOcaine 30 mL, nystatin 30 mL) 120mL 10 mLs, Oral, 4 times daily, Swish and swallow    ergocalciferol, vitamin D2, (VITAMIN D ORAL) No dose, route, or frequency recorded.    furosemide (LASIX) 20 mg, Oral, Daily    LACTOBACILLUS ACIDOPHILUS ORAL No dose, route, or frequency recorded.    LIDOcaine-prilocaine (EMLA) cream Topical (Top), As needed (PRN)    metoprolol tartrate (LOPRESSOR) 25 mg, Oral, 2 times daily    OLANZapine (ZYPREXA) 5 MG tablet Take 1 tablet by mouth nightly on days 1-4 of each chemotherapy cycle.    ondansetron (ZOFRAN) 4 MG tablet No dose, route, or frequency recorded.    phenazopyridine (PYRIDIUM) 100 mg, Oral, 3 times daily PRN    prochlorperazine (COMPAZINE) 10 mg, Oral, Every 6 hours PRN       Review of Systems:   seeabove        PHYSICAL EXAM:  LMP  (LMP Unknown)     ECOG 0  General: well appearing, in no apparent distress  HEENT: Normocephalic, EOMI, anicteric sclerae, MMM  Neck: supple, without cervical or supraclavicular lymphadenopathy.  Heart: regular rate and rhythm, normal S1 and S2, no murmurs, gallops or rubs.  Lungs: Clear to auscultation bilaterally, no increased wob  Breast: difficult to appreciate Rt breast mass at 3oclock  Abdomen: Soft, nontender, nondistended   Extremities: Mild LE edema or  no joint effusion  Skin: warm, well-perfused, no rash  Neurologic: Alert and oriented x 4, normal speech and gait   Psychiatric:  Conversing appropriately with providers throughout today's encounter.    Pertinent Labs & Imaging:  Reviewed recent labs, imaging and pathology.     Assessment & Plan:  Ms. Dela Cruz is a cuco 37yo woman with recently diagnosed cT2N0 HR+/Her2+ breast cancer who presents today for follow up.     Given that her tumor is as least T2N0, and that she is otherwise healthy, have proceeded with neoadjuvant treatment with TCHP. Previously reviewed dosing, schedule and potential side effects. She is now s/p C1 and tolerating fairly well thus far.    #HR+/Her2+ breast cancer:  --labs reviewed and ok to proceed with C5 as scheduled - will dose reduce to 60mg /m2 of taxotere due to diarrhea  --moved premedications before herceptin/perjeta and slowed rate given rigors w cycle 1   -- BRCA testing negative (no pathologic mutations, had to VUS in met and pten genes)  --s/p referral to integrative onc  -- she is [facility] Dr. Bermudez [facility] Gemini [facility]   --RTC in 3 weeks as schedued       #Chemo-induced diarrhea: stable on imodium and lomotil  --encouraged po intake  --cont imodium and lomotil prn  -- discussed psyllium bulking agent   --met with dietician will add what was recommended      #Thrush, hoarseness: resolved  --has duke's soln if needed      #Normocytic anemia: likely 2/2 chemo, pt reports recently finishing heavy menstrual cycle  --will cont to monitor     #Leg swelling  -- 20 mg lasix daily.     All questions were answered to her apparent satisfaction. Will see her back in 3 weeks or sooner should the need arise.    Return to clinic in 3 weeks with MD appointment and labs.     Patient is in agreement with the proposed treatment plan. All questions were answered to the patient's satisfaction. Patient knows to call clinic for any new or worsening symptoms and if anything is needed before the next clinic visit.          Paulina Rivrea, FNP-C  Hematology & Medical Oncology   [address] [place] [address]  ph. [phone]  Fax. [phone]    Collaborating physician, Dr. Blakely.    Approximately 15 minutes were spent face-to-face with the patient.  Approximately 25 minutes in total were spent on this encounter, which includes face-to-face time and non-face-to-face time preparing to see the patient (e.g., review of tests), obtaining and/or reviewing separately obtained history, documenting clinical information in the electronic or other health record, independently interpreting results (not separately reported) and communicating results to the patient/family/caregiver, or care coordination (not separately reported).       Route Chart for Scheduling    Med Onc Chart Routing      Follow up with physician 3 weeks. already scheduled with Dr. Blakely   Follow up with GILBERTO    Infusion scheduling note    Injection scheduling note    Labs    Imaging    Pharmacy appointment    Other referrals                  Treatment Plan Information   OP BREAST TCHP (pertuzumab trastuzumab DOCEtaxel CARBOplatin) Q3W   Kassie Blakely MD   Upcoming Treatment Dates - OP BREAST TCHP (pertuzumab trastuzumab DOCEtaxel CARBOplatin) Q3W    5/10/2024       Chemotherapy       pertuzumab (PERJETA) 420 mg in sodium chloride 0.9% 264 mL infusion       trastuzumab-anns (KANJINTI) 560 mg in sodium chloride 0.9% 250 mL chemo infusion       DOCEtaxel (TAXOTERE) 60 mg/m2 = 124 mg in sodium chloride 0.9% 256.2 mL chemo infusion       CARBOplatin (PARAPLATIN) 900 mg in sodium chloride 0.9% 340 mL chemo infusion       Supportive Care       prochlorperazine injection 10 mg       meperidine injection 25 mg       Antiemetics       famotidine (PF) injection 20 mg       diphenhydrAMINE (BENADRYL) 50 mg in sodium chloride 0.9% 50 mL IVPB       palonosetron (ALOXI) 0.25 mg with dexamethasone (DECADRON) 20 mg in NS 50 mL IVPB       fosaprepitant 150 mg in sodium chloride 0.9% 150 mL IVPB  5/11/2024       Growth Factor        pegfilgrastim-cbqv (UDENYCA) injection 6 mg  5/31/2024       Chemotherapy       pertuzumab (PERJETA) 420 mg in sodium chloride 0.9% 264 mL infusion       trastuzumab-anns (KANJINTI) 560 mg in sodium chloride 0.9% 250 mL chemo infusion       DOCEtaxel (TAXOTERE) 60 mg/m2 = 124 mg in sodium chloride 0.9% 256.2 mL chemo infusion       CARBOplatin (PARAPLATIN) 900 mg in sodium chloride 0.9% 340 mL chemo infusion       Supportive Care       prochlorperazine injection 10 mg       meperidine injection 25 mg       Antiemetics       famotidine (PF) injection 20 mg       diphenhydrAMINE (BENADRYL) 50 mg in sodium chloride 0.9% 50 mL IVPB       palonosetron (ALOXI) 0.25 mg with dexamethasone (DECADRON) 20 mg in NS 50 mL IVPB       fosaprepitant 150 mg in sodium chloride 0.9% 150 mL IVPB  6/1/2024       Growth Factor       pegfilgrastim-cbqv (UDENYCA) injection 6 mg    Supportive Plan Information  IV FLUIDS AND ELECTROLYTES   Kassie Blakely MD   Upcoming Treatment Dates - IV FLUIDS AND ELECTROLYTES    No upcoming days in selected categories.      MDM includes  :    - Acute or chronic illness or injury that poses a threat to life or bodily function  - Review of prior external notes from unique source  - Independent review and explanation of 3+ results from unique tests  - Discussion of management and ordering 3+ unique tests  - Extensive discussion of treatment and management  - Prescription drug management  - Drug therapy requiring intensive monitoring for toxicity

## 2024-04-27 ENCOUNTER — PATIENT MESSAGE (OUTPATIENT)
Dept: ADMINISTRATIVE | Facility: OTHER | Age: 39
End: 2024-04-27
Payer: COMMERCIAL

## 2024-04-29 ENCOUNTER — PATIENT MESSAGE (OUTPATIENT)
Dept: ADMINISTRATIVE | Facility: OTHER | Age: 39
End: 2024-04-29
Payer: COMMERCIAL

## 2024-05-01 ENCOUNTER — PATIENT MESSAGE (OUTPATIENT)
Dept: ADMINISTRATIVE | Facility: OTHER | Age: 39
End: 2024-05-01
Payer: COMMERCIAL

## 2024-05-01 ENCOUNTER — PATIENT MESSAGE (OUTPATIENT)
Dept: HEMATOLOGY/ONCOLOGY | Facility: CLINIC | Age: 39
End: 2024-05-01
Payer: COMMERCIAL

## 2024-05-02 DIAGNOSIS — R60.0 LEG EDEMA: ICD-10-CM

## 2024-05-02 RX ORDER — FUROSEMIDE 20 MG/1
20 TABLET ORAL DAILY
Qty: 30 TABLET | Refills: 0 | Status: SHIPPED | OUTPATIENT
Start: 2024-05-02 | End: 2024-05-30 | Stop reason: SDUPTHER

## 2024-05-03 ENCOUNTER — PATIENT MESSAGE (OUTPATIENT)
Dept: ADMINISTRATIVE | Facility: OTHER | Age: 39
End: 2024-05-03
Payer: COMMERCIAL

## 2024-05-06 ENCOUNTER — PATIENT MESSAGE (OUTPATIENT)
Dept: ADMINISTRATIVE | Facility: OTHER | Age: 39
End: 2024-05-06
Payer: COMMERCIAL

## 2024-05-07 ENCOUNTER — PATIENT MESSAGE (OUTPATIENT)
Dept: ADMINISTRATIVE | Facility: OTHER | Age: 39
End: 2024-05-07
Payer: COMMERCIAL

## 2024-05-08 ENCOUNTER — PATIENT MESSAGE (OUTPATIENT)
Dept: ADMINISTRATIVE | Facility: OTHER | Age: 39
End: 2024-05-08
Payer: COMMERCIAL

## 2024-05-09 ENCOUNTER — LAB VISIT (OUTPATIENT)
Dept: LAB | Facility: HOSPITAL | Age: 39
End: 2024-05-09
Attending: STUDENT IN AN ORGANIZED HEALTH CARE EDUCATION/TRAINING PROGRAM
Payer: COMMERCIAL

## 2024-05-09 DIAGNOSIS — Z17.0 MALIGNANT NEOPLASM OF UPPER-OUTER QUADRANT OF RIGHT BREAST IN FEMALE, ESTROGEN RECEPTOR POSITIVE: ICD-10-CM

## 2024-05-09 DIAGNOSIS — C50.411 MALIGNANT NEOPLASM OF UPPER-OUTER QUADRANT OF RIGHT BREAST IN FEMALE, ESTROGEN RECEPTOR POSITIVE: ICD-10-CM

## 2024-05-09 LAB
ALBUMIN SERPL BCP-MCNC: 3.2 G/DL (ref 3.5–5.2)
ALP SERPL-CCNC: 70 U/L (ref 55–135)
ALT SERPL W/O P-5'-P-CCNC: 22 U/L (ref 10–44)
ANION GAP SERPL CALC-SCNC: 5 MMOL/L (ref 8–16)
AST SERPL-CCNC: 21 U/L (ref 10–40)
BASOPHILS # BLD AUTO: 0.01 K/UL (ref 0–0.2)
BASOPHILS NFR BLD: 0.2 % (ref 0–1.9)
BILIRUB SERPL-MCNC: 0.2 MG/DL (ref 0.1–1)
BUN SERPL-MCNC: 12 MG/DL (ref 6–20)
CALCIUM SERPL-MCNC: 8.9 MG/DL (ref 8.7–10.5)
CHLORIDE SERPL-SCNC: 108 MMOL/L (ref 95–110)
CO2 SERPL-SCNC: 26 MMOL/L (ref 23–29)
CREAT SERPL-MCNC: 0.8 MG/DL (ref 0.5–1.4)
DIFFERENTIAL METHOD BLD: ABNORMAL
EOSINOPHIL # BLD AUTO: 0 K/UL (ref 0–0.5)
EOSINOPHIL NFR BLD: 0.2 % (ref 0–8)
ERYTHROCYTE [DISTWIDTH] IN BLOOD BY AUTOMATED COUNT: 17.4 % (ref 11.5–14.5)
EST. GFR  (NO RACE VARIABLE): >60 ML/MIN/1.73 M^2
GLUCOSE SERPL-MCNC: 97 MG/DL (ref 70–110)
HCG INTACT+B SERPL-ACNC: <2.4 MIU/ML
HCT VFR BLD AUTO: 33.4 % (ref 37–48.5)
HGB BLD-MCNC: 10.9 G/DL (ref 12–16)
IMM GRANULOCYTES # BLD AUTO: 0.01 K/UL (ref 0–0.04)
IMM GRANULOCYTES NFR BLD AUTO: 0.2 % (ref 0–0.5)
LYMPHOCYTES # BLD AUTO: 2 K/UL (ref 1–4.8)
LYMPHOCYTES NFR BLD: 41.9 % (ref 18–48)
MCH RBC QN AUTO: 31.3 PG (ref 27–31)
MCHC RBC AUTO-ENTMCNC: 32.6 G/DL (ref 32–36)
MCV RBC AUTO: 96 FL (ref 82–98)
MONOCYTES # BLD AUTO: 0.7 K/UL (ref 0.3–1)
MONOCYTES NFR BLD: 15.2 % (ref 4–15)
NEUTROPHILS # BLD AUTO: 2 K/UL (ref 1.8–7.7)
NEUTROPHILS NFR BLD: 42.3 % (ref 38–73)
NRBC BLD-RTO: 0 /100 WBC
PLATELET # BLD AUTO: 155 K/UL (ref 150–450)
PMV BLD AUTO: 9.4 FL (ref 9.2–12.9)
POTASSIUM SERPL-SCNC: 4.3 MMOL/L (ref 3.5–5.1)
PROT SERPL-MCNC: 6.1 G/DL (ref 6–8.4)
RBC # BLD AUTO: 3.48 M/UL (ref 4–5.4)
SODIUM SERPL-SCNC: 139 MMOL/L (ref 136–145)
WBC # BLD AUTO: 4.8 K/UL (ref 3.9–12.7)

## 2024-05-09 PROCEDURE — 84702 CHORIONIC GONADOTROPIN TEST: CPT | Performed by: STUDENT IN AN ORGANIZED HEALTH CARE EDUCATION/TRAINING PROGRAM

## 2024-05-09 PROCEDURE — 36415 COLL VENOUS BLD VENIPUNCTURE: CPT | Mod: PN | Performed by: STUDENT IN AN ORGANIZED HEALTH CARE EDUCATION/TRAINING PROGRAM

## 2024-05-09 PROCEDURE — 80053 COMPREHEN METABOLIC PANEL: CPT | Performed by: STUDENT IN AN ORGANIZED HEALTH CARE EDUCATION/TRAINING PROGRAM

## 2024-05-09 PROCEDURE — 85025 COMPLETE CBC W/AUTO DIFF WBC: CPT | Performed by: STUDENT IN AN ORGANIZED HEALTH CARE EDUCATION/TRAINING PROGRAM

## 2024-05-10 ENCOUNTER — INFUSION (OUTPATIENT)
Dept: INFUSION THERAPY | Facility: HOSPITAL | Age: 39
End: 2024-05-10
Payer: COMMERCIAL

## 2024-05-10 ENCOUNTER — OFFICE VISIT (OUTPATIENT)
Dept: HEMATOLOGY/ONCOLOGY | Facility: CLINIC | Age: 39
End: 2024-05-10
Payer: COMMERCIAL

## 2024-05-10 VITALS
BODY MASS INDEX: 39.14 KG/M2 | TEMPERATURE: 98 F | DIASTOLIC BLOOD PRESSURE: 55 MMHG | HEIGHT: 64 IN | WEIGHT: 229.25 LBS | SYSTOLIC BLOOD PRESSURE: 99 MMHG | RESPIRATION RATE: 18 BRPM | OXYGEN SATURATION: 100 % | HEART RATE: 82 BPM

## 2024-05-10 VITALS
HEIGHT: 64 IN | HEART RATE: 86 BPM | SYSTOLIC BLOOD PRESSURE: 121 MMHG | TEMPERATURE: 98 F | RESPIRATION RATE: 18 BRPM | OXYGEN SATURATION: 100 % | BODY MASS INDEX: 39.14 KG/M2 | WEIGHT: 229.25 LBS | DIASTOLIC BLOOD PRESSURE: 66 MMHG

## 2024-05-10 DIAGNOSIS — C50.411 MALIGNANT NEOPLASM OF UPPER-OUTER QUADRANT OF RIGHT BREAST IN FEMALE, ESTROGEN RECEPTOR POSITIVE: Primary | ICD-10-CM

## 2024-05-10 DIAGNOSIS — Z17.0 MALIGNANT NEOPLASM OF UPPER-OUTER QUADRANT OF RIGHT BREAST IN FEMALE, ESTROGEN RECEPTOR POSITIVE: Primary | ICD-10-CM

## 2024-05-10 PROCEDURE — 63600175 PHARM REV CODE 636 W HCPCS: Performed by: NURSE PRACTITIONER

## 2024-05-10 PROCEDURE — 96376 TX/PRO/DX INJ SAME DRUG ADON: CPT

## 2024-05-10 PROCEDURE — 96417 CHEMO IV INFUS EACH ADDL SEQ: CPT

## 2024-05-10 PROCEDURE — 3078F DIAST BP <80 MM HG: CPT | Mod: CPTII,S$GLB,, | Performed by: NURSE PRACTITIONER

## 2024-05-10 PROCEDURE — 96413 CHEMO IV INFUSION 1 HR: CPT

## 2024-05-10 PROCEDURE — 99999 PR PBB SHADOW E&M-EST. PATIENT-LVL IV: CPT | Mod: PBBFAC,,, | Performed by: NURSE PRACTITIONER

## 2024-05-10 PROCEDURE — A4216 STERILE WATER/SALINE, 10 ML: HCPCS | Performed by: NURSE PRACTITIONER

## 2024-05-10 PROCEDURE — 25000003 PHARM REV CODE 250: Performed by: NURSE PRACTITIONER

## 2024-05-10 PROCEDURE — 3074F SYST BP LT 130 MM HG: CPT | Mod: CPTII,S$GLB,, | Performed by: NURSE PRACTITIONER

## 2024-05-10 PROCEDURE — 3008F BODY MASS INDEX DOCD: CPT | Mod: CPTII,S$GLB,, | Performed by: NURSE PRACTITIONER

## 2024-05-10 PROCEDURE — 96367 TX/PROPH/DG ADDL SEQ IV INF: CPT

## 2024-05-10 PROCEDURE — 99215 OFFICE O/P EST HI 40 MIN: CPT | Mod: S$GLB,,, | Performed by: NURSE PRACTITIONER

## 2024-05-10 RX ORDER — MEPERIDINE HYDROCHLORIDE 50 MG/ML
25 INJECTION INTRAMUSCULAR; INTRAVENOUS; SUBCUTANEOUS ONCE AS NEEDED
Status: CANCELLED | OUTPATIENT
Start: 2024-05-10

## 2024-05-10 RX ORDER — SODIUM CHLORIDE 0.9 % (FLUSH) 0.9 %
10 SYRINGE (ML) INJECTION
Status: DISCONTINUED | OUTPATIENT
Start: 2024-05-10 | End: 2024-05-10 | Stop reason: HOSPADM

## 2024-05-10 RX ORDER — EPINEPHRINE 0.3 MG/.3ML
0.3 INJECTION SUBCUTANEOUS ONCE AS NEEDED
Status: CANCELLED | OUTPATIENT
Start: 2024-05-10

## 2024-05-10 RX ORDER — EPINEPHRINE 0.3 MG/.3ML
0.3 INJECTION SUBCUTANEOUS ONCE AS NEEDED
Status: DISCONTINUED | OUTPATIENT
Start: 2024-05-10 | End: 2024-05-10 | Stop reason: HOSPADM

## 2024-05-10 RX ORDER — PROCHLORPERAZINE EDISYLATE 5 MG/ML
10 INJECTION INTRAMUSCULAR; INTRAVENOUS ONCE AS NEEDED
Status: DISCONTINUED | OUTPATIENT
Start: 2024-05-10 | End: 2024-05-10 | Stop reason: HOSPADM

## 2024-05-10 RX ORDER — HEPARIN 100 UNIT/ML
500 SYRINGE INTRAVENOUS
Status: CANCELLED | OUTPATIENT
Start: 2024-05-10

## 2024-05-10 RX ORDER — FAMOTIDINE 10 MG/ML
20 INJECTION INTRAVENOUS
Status: CANCELLED
Start: 2024-05-10 | End: 2024-05-10

## 2024-05-10 RX ORDER — DIPHENHYDRAMINE HYDROCHLORIDE 50 MG/ML
50 INJECTION INTRAMUSCULAR; INTRAVENOUS ONCE AS NEEDED
Status: DISCONTINUED | OUTPATIENT
Start: 2024-05-10 | End: 2024-05-10 | Stop reason: HOSPADM

## 2024-05-10 RX ORDER — HEPARIN 100 UNIT/ML
500 SYRINGE INTRAVENOUS
Status: DISCONTINUED | OUTPATIENT
Start: 2024-05-10 | End: 2024-05-10 | Stop reason: HOSPADM

## 2024-05-10 RX ORDER — MEPERIDINE HYDROCHLORIDE 50 MG/ML
25 INJECTION INTRAMUSCULAR; INTRAVENOUS; SUBCUTANEOUS ONCE AS NEEDED
Status: DISCONTINUED | OUTPATIENT
Start: 2024-05-10 | End: 2024-05-10 | Stop reason: HOSPADM

## 2024-05-10 RX ORDER — DIPHENHYDRAMINE HYDROCHLORIDE 50 MG/ML
50 INJECTION INTRAMUSCULAR; INTRAVENOUS ONCE AS NEEDED
Status: CANCELLED | OUTPATIENT
Start: 2024-05-10

## 2024-05-10 RX ORDER — SODIUM CHLORIDE 0.9 % (FLUSH) 0.9 %
10 SYRINGE (ML) INJECTION
Status: CANCELLED | OUTPATIENT
Start: 2024-05-10

## 2024-05-10 RX ORDER — FAMOTIDINE 10 MG/ML
20 INJECTION INTRAVENOUS
Status: COMPLETED | OUTPATIENT
Start: 2024-05-10 | End: 2024-05-10

## 2024-05-10 RX ORDER — OXYCODONE HYDROCHLORIDE 5 MG/1
5 TABLET ORAL EVERY 4 HOURS PRN
Qty: 30 TABLET | Refills: 0 | Status: SHIPPED | OUTPATIENT
Start: 2024-05-10 | End: 2024-05-30 | Stop reason: SDUPTHER

## 2024-05-10 RX ORDER — PROCHLORPERAZINE EDISYLATE 5 MG/ML
10 INJECTION INTRAMUSCULAR; INTRAVENOUS ONCE AS NEEDED
Status: CANCELLED | OUTPATIENT
Start: 2024-05-10

## 2024-05-10 RX ADMIN — DOCETAXEL 120 MG: 10 INJECTION, SOLUTION INTRAVENOUS at 04:05

## 2024-05-10 RX ADMIN — DEXAMETHASONE SODIUM PHOSPHATE 0.25 MG: 4 INJECTION, SOLUTION INTRA-ARTICULAR; INTRALESIONAL; INTRAMUSCULAR; INTRAVENOUS; SOFT TISSUE at 02:05

## 2024-05-10 RX ADMIN — DIPHENHYDRAMINE HYDROCHLORIDE 50 MG: 50 INJECTION, SOLUTION INTRAMUSCULAR; INTRAVENOUS at 01:05

## 2024-05-10 RX ADMIN — TRASTUZUMAB-ANNS 560 MG: 420 INJECTION, POWDER, LYOPHILIZED, FOR SOLUTION INTRAVENOUS at 03:05

## 2024-05-10 RX ADMIN — FAMOTIDINE 20 MG: 10 INJECTION INTRAVENOUS at 01:05

## 2024-05-10 RX ADMIN — FOSAPREPITANT 150 MG: 150 INJECTION, POWDER, LYOPHILIZED, FOR SOLUTION INTRAVENOUS at 02:05

## 2024-05-10 RX ADMIN — Medication 10 ML: at 05:05

## 2024-05-10 RX ADMIN — CARBOPLATIN 900 MG: 10 INJECTION, SOLUTION INTRAVENOUS at 05:05

## 2024-05-10 RX ADMIN — PERTUZUMAB 420 MG: 30 INJECTION, SOLUTION, CONCENTRATE INTRAVENOUS at 03:05

## 2024-05-10 RX ADMIN — HEPARIN 500 UNITS: 100 SYRINGE at 05:05

## 2024-05-10 NOTE — PLAN OF CARE
"  Problem: Fatigue  Goal: Improved Activity Tolerance  Outcome: Progressing  Intervention: Promote Improved Energy  Flowsheets (Taken 5/10/2024 1300)  Fatigue Management:   activity schedule adjusted   activity assistance provided   fatigue-related activity identified   frequent rest breaks encouraged   paced activity encouraged  Sleep/Rest Enhancement:   awakenings minimized   consistent schedule promoted   natural light exposure provided   noise level reduced   reading promoted   regular sleep/rest pattern promoted   relaxation techniques promoted  Activity Management:   Ambulated -L4   Up in chair - L3  Environmental Support:   calm environment promoted   caregiver consistency promoted   comfort object encouraged   distractions minimized   environmental consistency promoted   personal routine supported   rest periods encouraged  BP (!) 99/55 (Patient Position: Sitting)   Pulse 82   Temp 97.8 °F (36.6 °C)   Resp 18   Ht 5' 4" (1.626 m)   Wt 104 kg (229 lb 4.5 oz)   LMP  (LMP Unknown)   SpO2 100%   BMI 39.36 kg/m² Pleasant, alert and oriented patient to Chemo Infusion per self for D1C5 TCHP - VSS and LCW Port accessed with blood return observed, flushed with NS, dressing applied and patient tolerated procedure well - patient tolerated treatment with no AVE's, port flushed with NS/Heparin, blood return observed, port de-accessed, band-aide applied and patient discharged to home with no concerns - RTC on 5/11/24     "

## 2024-05-11 ENCOUNTER — INFUSION (OUTPATIENT)
Dept: INFUSION THERAPY | Facility: HOSPITAL | Age: 39
End: 2024-05-11
Payer: COMMERCIAL

## 2024-05-11 ENCOUNTER — PATIENT MESSAGE (OUTPATIENT)
Dept: ADMINISTRATIVE | Facility: OTHER | Age: 39
End: 2024-05-11
Payer: COMMERCIAL

## 2024-05-11 VITALS — TEMPERATURE: 98 F

## 2024-05-11 DIAGNOSIS — Z17.0 MALIGNANT NEOPLASM OF UPPER-OUTER QUADRANT OF RIGHT BREAST IN FEMALE, ESTROGEN RECEPTOR POSITIVE: Primary | ICD-10-CM

## 2024-05-11 DIAGNOSIS — C50.411 MALIGNANT NEOPLASM OF UPPER-OUTER QUADRANT OF RIGHT BREAST IN FEMALE, ESTROGEN RECEPTOR POSITIVE: Primary | ICD-10-CM

## 2024-05-11 PROCEDURE — 63600175 PHARM REV CODE 636 W HCPCS: Mod: JZ,JG | Performed by: STUDENT IN AN ORGANIZED HEALTH CARE EDUCATION/TRAINING PROGRAM

## 2024-05-11 PROCEDURE — 96372 THER/PROPH/DIAG INJ SC/IM: CPT

## 2024-05-11 RX ADMIN — PEGFILGRASTIM-CBQV 6 MG: 6 INJECTION, SOLUTION SUBCUTANEOUS at 10:05

## 2024-05-11 NOTE — PLAN OF CARE
Problem: Chemotherapy Effects  Goal: Anemia Symptom Improvement  Outcome: Progressing  Goal: Safety Maintained  Outcome: Progressing  Goal: Absence of Hematuria  Outcome: Progressing  Goal: Nausea and Vomiting Relief  Outcome: Progressing  Goal: Neurotoxicity Symptom Control  Outcome: Progressing  Goal: Absence of Infection  Outcome: Progressing  Goal: Absence of Bleeding  Outcome: Progressing     Problem: Fatigue  Goal: Improved Activity Tolerance  Outcome: Progressing     Problem: Anemia  Goal: Anemia Symptom Improvement  Outcome: Progressing     Problem: Infection  Goal: Absence of Infection Signs and Symptoms  Outcome: Progressing   Pt completed UDENYCA inj SQ without adverse effects. Temp WNL. Discharged AAOX 4 and ambualtory

## 2024-05-12 ENCOUNTER — PATIENT MESSAGE (OUTPATIENT)
Dept: ADMINISTRATIVE | Facility: OTHER | Age: 39
End: 2024-05-12
Payer: COMMERCIAL

## 2024-05-13 ENCOUNTER — PATIENT MESSAGE (OUTPATIENT)
Dept: ADMINISTRATIVE | Facility: OTHER | Age: 39
End: 2024-05-13
Payer: COMMERCIAL

## 2024-05-14 ENCOUNTER — PATIENT MESSAGE (OUTPATIENT)
Dept: ADMINISTRATIVE | Facility: OTHER | Age: 39
End: 2024-05-14
Payer: COMMERCIAL

## 2024-05-15 ENCOUNTER — PATIENT MESSAGE (OUTPATIENT)
Dept: ADMINISTRATIVE | Facility: OTHER | Age: 39
End: 2024-05-15
Payer: COMMERCIAL

## 2024-05-16 ENCOUNTER — PATIENT MESSAGE (OUTPATIENT)
Dept: ADMINISTRATIVE | Facility: OTHER | Age: 39
End: 2024-05-16
Payer: COMMERCIAL

## 2024-05-20 ENCOUNTER — PATIENT MESSAGE (OUTPATIENT)
Dept: ADMINISTRATIVE | Facility: OTHER | Age: 39
End: 2024-05-20
Payer: COMMERCIAL

## 2024-05-21 ENCOUNTER — PATIENT MESSAGE (OUTPATIENT)
Dept: ADMINISTRATIVE | Facility: OTHER | Age: 39
End: 2024-05-21
Payer: COMMERCIAL

## 2024-05-22 ENCOUNTER — PATIENT MESSAGE (OUTPATIENT)
Dept: ADMINISTRATIVE | Facility: OTHER | Age: 39
End: 2024-05-22
Payer: COMMERCIAL

## 2024-05-23 ENCOUNTER — PATIENT MESSAGE (OUTPATIENT)
Dept: ADMINISTRATIVE | Facility: OTHER | Age: 39
End: 2024-05-23
Payer: COMMERCIAL

## 2024-05-23 NOTE — PROGRESS NOTES
[facility]/ [facility]      Chief Complaint:   HR+/Her2+ breast cancer      Cancer Staging   Malignant neoplasm of upper-outer quadrant of right breast in female, estrogen receptor positive  Staging form: Breast, AJCC 8th Edition  - Clinical stage from 2024: Stage IB (cT2, cN0, cM0, G3, ER+, OK+, HER2+) - Signed by Kassie Blakely MD on 2024    HPI:  Madhu Dela Cruz is a 37yo woman who presents today for evaluation of newly diagnosed breast cancer. Oncologic history below:     -2023 Screening mammogram with no evidence of malignancy   -2024 Diagnostic mammogram (palpable right breast mass) with  2.2 x 1.8 x 2.1 cm right breast mass   -2024 Needle biopsy of right breast with IDC grade 3, 8mm in greatest dimension, %, OK 40%, HER2 3+, Ki-67 >90%.  -24 Breast MRI with right breast 2.4 cm x 2.1 cm x 2.3 cm mass at the 3 o'clock position. A smaller benign appearing right breast mass. Normal axillary lymph nodes.   -started neoadjuvant TCHP 2024    Gyn History:   Menarche: Age 10   Menopause: Pre    Age at first pregnancy: 15  : 2nd child, not first  HRT: Was on hormonal birth control, stopped   Does not desire fertility preservation.   Family History: Maternal aunt, diagnosed later in life. No ovarian cancer.     She lives in [facility] with her , they run a photo lubin business.      Interval history: Presents for Cycle 6 of TCHP. Over all she is having generalized aching. Started in her legs and her back. She has tried tylenol without relief, she now takes oxycodone twice a day when it wears off she notes her pain is a 6/10.  Diarrhea is feeling better with dose reduced taxotere. Still taking imodium PRN.   She is fatigued as well. Denies fever and chills.   Appetite is good. Minimal nausea, compazine is working.   Still with some bilateral swelling in the legs, take lasix 20  "mg daily. She is also elevating her legs.   Notes the metoprolol did decrease her heart rate, 25 mg BID. Feeling okay on metoprolol.   She is now having incontinence, this has happened twice, denies UTI symptoms.     Occasional peripheral neuropathy.       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    duke's soln (benadryl 30 mL, mylanta 30 mL, LIDOcaine 30 mL, nystatin 30 mL) 120mL 10 mLs, Oral, 4 times daily, Swish and swallow    ergocalciferol, vitamin D2, (VITAMIN D ORAL) No dose, route, or frequency recorded.    furosemide (LASIX) 20 mg, Oral, Daily    LACTOBACILLUS ACIDOPHILUS ORAL No dose, route, or frequency recorded.    LIDOcaine-prilocaine (EMLA) cream Topical (Top), As needed (PRN)    metoprolol tartrate (LOPRESSOR) 25 mg, Oral, 2 times daily    OLANZapine (ZYPREXA) 5 MG tablet Take 1 tablet by mouth nightly on days 1-4 of each chemotherapy cycle.    ondansetron (ZOFRAN) 4 MG tablet No dose, route, or frequency recorded.    oxyCODONE (ROXICODONE) 5 mg, Oral, Every 4 hours PRN    phenazopyridine (PYRIDIUM) 100 mg, Oral, 3 times daily PRN    prochlorperazine (COMPAZINE) 10 mg, Oral, Every 6 hours PRN       Review of Systems:   [facility]        PHYSICAL EXAM:  /72   Pulse 90   Temp 98.5 °F (36.9 °C) (Oral)   Ht 5' 4" (1.626 m)   Wt 105.6 kg (232 lb 12.9 oz)   SpO2 98%   BMI 39.96 kg/m²     ECOG 0  General: well appearing, in no apparent distress  HEENT: Normocephalic, EOMI, anicteric sclerae, MMM  Neck: supple, without cervical or supraclavicular lymphadenopathy.  Heart: regular rate and rhythm, normal S1 and S2, no murmurs, gallops or rubs.  Lungs: Clear to auscultation bilaterally, no " increased wob  Breast: difficult to appreciate Rt breast mass at 3oclock  Abdomen: Soft, nontender, nondistended   Extremities: Mild LE edema or  no joint effusion  Skin: warm, well-perfused, no rash  Neurologic: Alert and oriented x 4, normal speech and gait   Psychiatric: Conversing appropriately with providers throughout today's encounter.    Pertinent Labs & Imaging:  Reviewed recent labs, imaging and pathology.     Assessment & Plan:  Ms. Dela Cruz is a cuco 39yo woman with recently diagnosed cT2N0 HR+/Her2+ breast cancer who presents today for follow up.     Given that her tumor is as least T2N0, and that she is otherwise healthy, have proceeded with neoadjuvant treatment with TCHP. Previously reviewed dosing, schedule and potential side effects. She is now s/p C1 and tolerating fairly well thus far.    #HR+/Her2+ breast cancer:  --labs reviewed and ok to proceed with C6 as scheduled - will dose reduce to 60mg /m2 of taxotere due to diarrhea  --moved premedications before herceptin/perjeta and slowed rate given rigors w cycle 1   -- BRCA testing negative (no pathologic mutations, had to VUS in met and pten genes)  --s/p referral to integrative onc  -- she is seeing Dr. Bermudez and Gemini [facility]   --surgery scheduled July 8   --RTC in 3 weeks as schedued       #Chemo-induced diarrhea: stable on imodium and lomotil  --encouraged po intake  --cont imodium and lomotil prn  -- discussed psyllium bulking agent   --met with dietician will add what was recommended      #Thrush, hoarseness: resolved  --has duke's soln if needed      #Normocytic anemia: likely 2/2 chemo, pt reports recently finishing heavy menstrual cycle  --will cont to monitor     #Leg swelling  -- 20 mg lasix daily.     All questions were answered to her apparent satisfaction. Will see her back in 3 weeks or sooner should the need arise.    Return to clinic in 3 weeks with MD appointment and labs.     Patient is in  agreement with the proposed treatment plan. All questions were answered to the patient's satisfaction. Patient knows to call clinic for any new or worsening symptoms and if anything is needed before the next clinic visit.          Paulina Rivera, FNP-C  Hematology & Medical Oncology   [facility] [place] [address]  ph. [phone]  Fax. [phone]    Collaborating physician, Dr. Blakely.    Approximately 15 minutes were spent face-to-face with the patient.  Approximately 25 minutes in total were spent on this encounter, which includes face-to-face time and non-face-to-face time preparing to see the patient (e.g., review of tests), obtaining and/or reviewing separately obtained history, documenting clinical information in the electronic or other health record, independently interpreting results (not separately reported) and communicating results to the patient/family/caregiver, or care coordination (not separately reported).       Route Chart for Scheduling    Med Onc Chart Routing  Urgent    Follow up with physician    Follow up with GILBERTO 4 weeks. me or Dr. Blakely for labs check - last week of rocio same day as echo   Infusion scheduling note    Injection scheduling note    Labs CBC and CMP   Scheduling:  Preferred lab:  Lab interval:  CBC and CMp last week of june   Imaging ECHO   last week of rocio   Pharmacy appointment    Other referrals                  Treatment Plan Information   OP BREAST TCHP (pertuzumab trastuzumab DOCEtaxel CARBOplatin) Q3W   Kassie Blakely MD   Upcoming Treatment Dates - OP BREAST TCHP (pertuzumab trastuzumab DOCEtaxel CARBOplatin) Q3W    5/31/2024       Chemotherapy       pertuzumab (PERJETA) 420 mg in sodium chloride 0.9% 264 mL infusion       trastuzumab-anns (KANJINTI) 560 mg in sodium chloride 0.9% 250 mL chemo infusion       DOCEtaxel (TAXOTERE) 60 mg/m2 = 124 mg in sodium chloride 0.9% 256.2 mL chemo infusion       CARBOplatin (PARAPLATIN) in sodium chloride  0.9% 250 mL chemo infusion       Supportive Care       prochlorperazine injection 10 mg       meperidine injection 25 mg       Antiemetics       famotidine (PF) injection 20 mg       diphenhydrAMINE (BENADRYL) 50 mg in NS 50 mL IVPB       palonosetron 0.25mg/dexAMETHasone 20mg in NS IVPB 0.25 mg 50 mL       fosaprepitant 150 mg in sodium chloride 0.9% 150 mL IVPB  6/1/2024       Growth Factor       pegfilgrastim-cbqv (UDENYCA) injection 6 mg  6/21/2024       Chemotherapy       pertuzumab (PERJETA) 420 mg in sodium chloride 0.9% 264 mL infusion       trastuzumab-anns (KANJINTI) 560 mg in sodium chloride 0.9% 250 mL chemo infusion       Supportive Care       prochlorperazine injection 10 mg       meperidine injection 25 mg       Antiemetics       dexAMETHasone injection 20 mg       famotidine (PF) injection 20 mg       diphenhydrAMINE (BENADRYL) 50 mg in NS 50 mL IVPB  7/12/2024       Chemotherapy       pertuzumab (PERJETA) 420 mg in sodium chloride 0.9% 264 mL infusion       trastuzumab-anns (KANJINTI) 560 mg in sodium chloride 0.9% 250 mL chemo infusion       Supportive Care       prochlorperazine injection 10 mg       meperidine injection 25 mg       Antiemetics       dexAMETHasone injection 20 mg       famotidine (PF) injection 20 mg       diphenhydrAMINE (BENADRYL) 50 mg in NS 50 mL IVPB    Supportive Plan Information  IV FLUIDS AND ELECTROLYTES   Kassie Blakely MD   Upcoming Treatment Dates - IV FLUIDS AND ELECTROLYTES    No upcoming days in selected categories.      MDM includes  :    - Acute or chronic illness or injury that poses a threat to life or bodily function  - Review of prior external notes from unique source  - Independent review and explanation of 3+ results from unique tests  - Discussion of management and ordering 3+ unique tests  - Extensive discussion of treatment and management  - Prescription drug management  - Drug therapy requiring intensive monitoring for toxicity

## 2024-05-26 ENCOUNTER — PATIENT MESSAGE (OUTPATIENT)
Dept: ADMINISTRATIVE | Facility: OTHER | Age: 39
End: 2024-05-26
Payer: COMMERCIAL

## 2024-05-29 ENCOUNTER — LAB VISIT (OUTPATIENT)
Dept: LAB | Facility: HOSPITAL | Age: 39
End: 2024-05-29
Attending: STUDENT IN AN ORGANIZED HEALTH CARE EDUCATION/TRAINING PROGRAM
Payer: COMMERCIAL

## 2024-05-29 DIAGNOSIS — C50.411 MALIGNANT NEOPLASM OF UPPER-OUTER QUADRANT OF RIGHT BREAST IN FEMALE, ESTROGEN RECEPTOR POSITIVE: ICD-10-CM

## 2024-05-29 DIAGNOSIS — Z17.0 MALIGNANT NEOPLASM OF UPPER-OUTER QUADRANT OF RIGHT BREAST IN FEMALE, ESTROGEN RECEPTOR POSITIVE: ICD-10-CM

## 2024-05-29 LAB
ALBUMIN SERPL BCP-MCNC: 3.2 G/DL (ref 3.5–5.2)
ALP SERPL-CCNC: 71 U/L (ref 55–135)
ALT SERPL W/O P-5'-P-CCNC: 21 U/L (ref 10–44)
ANION GAP SERPL CALC-SCNC: 9 MMOL/L (ref 8–16)
AST SERPL-CCNC: 19 U/L (ref 10–40)
BASOPHILS # BLD AUTO: 0.01 K/UL (ref 0–0.2)
BASOPHILS NFR BLD: 0.2 % (ref 0–1.9)
BILIRUB SERPL-MCNC: 0.3 MG/DL (ref 0.1–1)
BUN SERPL-MCNC: 13 MG/DL (ref 6–20)
CALCIUM SERPL-MCNC: 8.8 MG/DL (ref 8.7–10.5)
CHLORIDE SERPL-SCNC: 106 MMOL/L (ref 95–110)
CO2 SERPL-SCNC: 25 MMOL/L (ref 23–29)
CREAT SERPL-MCNC: 0.7 MG/DL (ref 0.5–1.4)
DIFFERENTIAL METHOD BLD: ABNORMAL
EOSINOPHIL # BLD AUTO: 0 K/UL (ref 0–0.5)
EOSINOPHIL NFR BLD: 0 % (ref 0–8)
ERYTHROCYTE [DISTWIDTH] IN BLOOD BY AUTOMATED COUNT: 16.6 % (ref 11.5–14.5)
EST. GFR  (NO RACE VARIABLE): >60 ML/MIN/1.73 M^2
GLUCOSE SERPL-MCNC: 97 MG/DL (ref 70–110)
HCG INTACT+B SERPL-ACNC: <2.4 MIU/ML
HCT VFR BLD AUTO: 33.1 % (ref 37–48.5)
HGB BLD-MCNC: 10.4 G/DL (ref 12–16)
IMM GRANULOCYTES # BLD AUTO: 0.01 K/UL (ref 0–0.04)
IMM GRANULOCYTES NFR BLD AUTO: 0.2 % (ref 0–0.5)
LYMPHOCYTES # BLD AUTO: 2.4 K/UL (ref 1–4.8)
LYMPHOCYTES NFR BLD: 51.4 % (ref 18–48)
MCH RBC QN AUTO: 31.3 PG (ref 27–31)
MCHC RBC AUTO-ENTMCNC: 31.4 G/DL (ref 32–36)
MCV RBC AUTO: 100 FL (ref 82–98)
MONOCYTES # BLD AUTO: 0.8 K/UL (ref 0.3–1)
MONOCYTES NFR BLD: 16.7 % (ref 4–15)
NEUTROPHILS # BLD AUTO: 1.5 K/UL (ref 1.8–7.7)
NEUTROPHILS NFR BLD: 31.5 % (ref 38–73)
NRBC BLD-RTO: 0 /100 WBC
PLATELET # BLD AUTO: 206 K/UL (ref 150–450)
PMV BLD AUTO: 9.7 FL (ref 9.2–12.9)
POTASSIUM SERPL-SCNC: 4.1 MMOL/L (ref 3.5–5.1)
PROT SERPL-MCNC: 6 G/DL (ref 6–8.4)
RBC # BLD AUTO: 3.32 M/UL (ref 4–5.4)
SODIUM SERPL-SCNC: 140 MMOL/L (ref 136–145)
WBC # BLD AUTO: 4.73 K/UL (ref 3.9–12.7)

## 2024-05-29 PROCEDURE — 36415 COLL VENOUS BLD VENIPUNCTURE: CPT | Mod: PN | Performed by: STUDENT IN AN ORGANIZED HEALTH CARE EDUCATION/TRAINING PROGRAM

## 2024-05-29 PROCEDURE — 80053 COMPREHEN METABOLIC PANEL: CPT | Performed by: STUDENT IN AN ORGANIZED HEALTH CARE EDUCATION/TRAINING PROGRAM

## 2024-05-29 PROCEDURE — 84702 CHORIONIC GONADOTROPIN TEST: CPT | Performed by: STUDENT IN AN ORGANIZED HEALTH CARE EDUCATION/TRAINING PROGRAM

## 2024-05-29 PROCEDURE — 85025 COMPLETE CBC W/AUTO DIFF WBC: CPT | Performed by: STUDENT IN AN ORGANIZED HEALTH CARE EDUCATION/TRAINING PROGRAM

## 2024-05-30 ENCOUNTER — OFFICE VISIT (OUTPATIENT)
Dept: HEMATOLOGY/ONCOLOGY | Facility: CLINIC | Age: 39
End: 2024-05-30
Payer: COMMERCIAL

## 2024-05-30 ENCOUNTER — PATIENT MESSAGE (OUTPATIENT)
Dept: ADMINISTRATIVE | Facility: OTHER | Age: 39
End: 2024-05-30
Payer: COMMERCIAL

## 2024-05-30 ENCOUNTER — TELEPHONE (OUTPATIENT)
Dept: HEMATOLOGY/ONCOLOGY | Facility: CLINIC | Age: 39
End: 2024-05-30
Payer: COMMERCIAL

## 2024-05-30 VITALS
HEART RATE: 90 BPM | WEIGHT: 232.81 LBS | TEMPERATURE: 99 F | DIASTOLIC BLOOD PRESSURE: 72 MMHG | OXYGEN SATURATION: 98 % | SYSTOLIC BLOOD PRESSURE: 118 MMHG | BODY MASS INDEX: 39.75 KG/M2 | HEIGHT: 64 IN

## 2024-05-30 DIAGNOSIS — R60.0 LEG EDEMA: ICD-10-CM

## 2024-05-30 DIAGNOSIS — N39.41 URGENCY INCONTINENCE: ICD-10-CM

## 2024-05-30 DIAGNOSIS — C50.411 MALIGNANT NEOPLASM OF UPPER-OUTER QUADRANT OF RIGHT BREAST IN FEMALE, ESTROGEN RECEPTOR POSITIVE: Primary | ICD-10-CM

## 2024-05-30 DIAGNOSIS — Z17.0 MALIGNANT NEOPLASM OF UPPER-OUTER QUADRANT OF RIGHT BREAST IN FEMALE, ESTROGEN RECEPTOR POSITIVE: Primary | ICD-10-CM

## 2024-05-30 LAB
BACTERIA #/AREA URNS AUTO: ABNORMAL /HPF
BILIRUB UR QL STRIP: NEGATIVE
CLARITY UR REFRACT.AUTO: CLEAR
COLOR UR AUTO: YELLOW
GLUCOSE UR QL STRIP: NEGATIVE
HGB UR QL STRIP: NEGATIVE
HYALINE CASTS UR QL AUTO: 3 /LPF
KETONES UR QL STRIP: NEGATIVE
LEUKOCYTE ESTERASE UR QL STRIP: ABNORMAL
MICROSCOPIC COMMENT: ABNORMAL
NITRITE UR QL STRIP: NEGATIVE
PH UR STRIP: 5 [PH] (ref 5–8)
PROT UR QL STRIP: NEGATIVE
RBC #/AREA URNS AUTO: 1 /HPF (ref 0–4)
SP GR UR STRIP: 1.02 (ref 1–1.03)
SQUAMOUS #/AREA URNS AUTO: 1 /HPF
URN SPEC COLLECT METH UR: ABNORMAL
WBC #/AREA URNS AUTO: 2 /HPF (ref 0–5)

## 2024-05-30 PROCEDURE — G2211 COMPLEX E/M VISIT ADD ON: HCPCS | Mod: S$GLB,,, | Performed by: NURSE PRACTITIONER

## 2024-05-30 PROCEDURE — 3074F SYST BP LT 130 MM HG: CPT | Mod: CPTII,S$GLB,, | Performed by: NURSE PRACTITIONER

## 2024-05-30 PROCEDURE — 99999 PR PBB SHADOW E&M-EST. PATIENT-LVL IV: CPT | Mod: PBBFAC,,, | Performed by: NURSE PRACTITIONER

## 2024-05-30 PROCEDURE — 1160F RVW MEDS BY RX/DR IN RCRD: CPT | Mod: CPTII,S$GLB,, | Performed by: NURSE PRACTITIONER

## 2024-05-30 PROCEDURE — 3078F DIAST BP <80 MM HG: CPT | Mod: CPTII,S$GLB,, | Performed by: NURSE PRACTITIONER

## 2024-05-30 PROCEDURE — 1159F MED LIST DOCD IN RCRD: CPT | Mod: CPTII,S$GLB,, | Performed by: NURSE PRACTITIONER

## 2024-05-30 PROCEDURE — 3008F BODY MASS INDEX DOCD: CPT | Mod: CPTII,S$GLB,, | Performed by: NURSE PRACTITIONER

## 2024-05-30 PROCEDURE — 99215 OFFICE O/P EST HI 40 MIN: CPT | Mod: S$GLB,,, | Performed by: NURSE PRACTITIONER

## 2024-05-30 PROCEDURE — 81001 URINALYSIS AUTO W/SCOPE: CPT | Performed by: NURSE PRACTITIONER

## 2024-05-30 RX ORDER — FUROSEMIDE 20 MG/1
20 TABLET ORAL DAILY
Qty: 30 TABLET | Refills: 0 | Status: SHIPPED | OUTPATIENT
Start: 2024-05-30 | End: 2024-06-30

## 2024-05-30 RX ORDER — PROCHLORPERAZINE EDISYLATE 5 MG/ML
10 INJECTION INTRAMUSCULAR; INTRAVENOUS ONCE AS NEEDED
Status: CANCELLED | OUTPATIENT
Start: 2024-05-31

## 2024-05-30 RX ORDER — PROCHLORPERAZINE MALEATE 5 MG
10 TABLET ORAL EVERY 6 HOURS PRN
Qty: 20 TABLET | Refills: 5 | Status: SHIPPED | OUTPATIENT
Start: 2024-05-30

## 2024-05-30 RX ORDER — MEPERIDINE HYDROCHLORIDE 50 MG/ML
25 INJECTION INTRAMUSCULAR; INTRAVENOUS; SUBCUTANEOUS ONCE AS NEEDED
Status: CANCELLED | OUTPATIENT
Start: 2024-05-31

## 2024-05-30 RX ORDER — HEPARIN 100 UNIT/ML
500 SYRINGE INTRAVENOUS
Status: CANCELLED | OUTPATIENT
Start: 2024-05-31

## 2024-05-30 RX ORDER — FAMOTIDINE 10 MG/ML
20 INJECTION INTRAVENOUS
Status: CANCELLED
Start: 2024-05-31 | End: 2024-05-31

## 2024-05-30 RX ORDER — EPINEPHRINE 0.3 MG/.3ML
0.3 INJECTION SUBCUTANEOUS ONCE AS NEEDED
Status: CANCELLED | OUTPATIENT
Start: 2024-05-31

## 2024-05-30 RX ORDER — OXYCODONE HYDROCHLORIDE 5 MG/1
5 TABLET ORAL EVERY 4 HOURS PRN
Qty: 30 TABLET | Refills: 0 | Status: SHIPPED | OUTPATIENT
Start: 2024-05-30

## 2024-05-30 RX ORDER — SODIUM CHLORIDE 0.9 % (FLUSH) 0.9 %
10 SYRINGE (ML) INJECTION
Status: CANCELLED | OUTPATIENT
Start: 2024-05-31

## 2024-05-30 RX ORDER — DIPHENHYDRAMINE HYDROCHLORIDE 50 MG/ML
50 INJECTION INTRAMUSCULAR; INTRAVENOUS ONCE AS NEEDED
Status: CANCELLED | OUTPATIENT
Start: 2024-05-31

## 2024-05-31 ENCOUNTER — PATIENT MESSAGE (OUTPATIENT)
Dept: ADMINISTRATIVE | Facility: OTHER | Age: 39
End: 2024-05-31
Payer: COMMERCIAL

## 2024-05-31 ENCOUNTER — INFUSION (OUTPATIENT)
Dept: INFUSION THERAPY | Facility: HOSPITAL | Age: 39
End: 2024-05-31
Payer: COMMERCIAL

## 2024-05-31 VITALS
BODY MASS INDEX: 39.33 KG/M2 | WEIGHT: 230.38 LBS | HEIGHT: 64 IN | TEMPERATURE: 98 F | DIASTOLIC BLOOD PRESSURE: 58 MMHG | SYSTOLIC BLOOD PRESSURE: 111 MMHG | HEART RATE: 86 BPM | OXYGEN SATURATION: 98 % | RESPIRATION RATE: 16 BRPM

## 2024-05-31 DIAGNOSIS — C50.411 MALIGNANT NEOPLASM OF UPPER-OUTER QUADRANT OF RIGHT BREAST IN FEMALE, ESTROGEN RECEPTOR POSITIVE: Primary | ICD-10-CM

## 2024-05-31 DIAGNOSIS — Z17.0 MALIGNANT NEOPLASM OF UPPER-OUTER QUADRANT OF RIGHT BREAST IN FEMALE, ESTROGEN RECEPTOR POSITIVE: Primary | ICD-10-CM

## 2024-05-31 PROCEDURE — 96367 TX/PROPH/DG ADDL SEQ IV INF: CPT

## 2024-05-31 PROCEDURE — 96413 CHEMO IV INFUSION 1 HR: CPT

## 2024-05-31 PROCEDURE — 25000003 PHARM REV CODE 250: Performed by: STUDENT IN AN ORGANIZED HEALTH CARE EDUCATION/TRAINING PROGRAM

## 2024-05-31 PROCEDURE — 63600175 PHARM REV CODE 636 W HCPCS: Performed by: STUDENT IN AN ORGANIZED HEALTH CARE EDUCATION/TRAINING PROGRAM

## 2024-05-31 PROCEDURE — 96375 TX/PRO/DX INJ NEW DRUG ADDON: CPT

## 2024-05-31 PROCEDURE — A4216 STERILE WATER/SALINE, 10 ML: HCPCS | Performed by: STUDENT IN AN ORGANIZED HEALTH CARE EDUCATION/TRAINING PROGRAM

## 2024-05-31 PROCEDURE — 96417 CHEMO IV INFUS EACH ADDL SEQ: CPT

## 2024-05-31 RX ORDER — DIPHENHYDRAMINE HYDROCHLORIDE 50 MG/ML
50 INJECTION INTRAMUSCULAR; INTRAVENOUS ONCE AS NEEDED
Status: DISCONTINUED | OUTPATIENT
Start: 2024-05-31 | End: 2024-05-31 | Stop reason: HOSPADM

## 2024-05-31 RX ORDER — PROCHLORPERAZINE EDISYLATE 5 MG/ML
10 INJECTION INTRAMUSCULAR; INTRAVENOUS ONCE AS NEEDED
Status: DISCONTINUED | OUTPATIENT
Start: 2024-05-31 | End: 2024-05-31 | Stop reason: HOSPADM

## 2024-05-31 RX ORDER — MEPERIDINE HYDROCHLORIDE 50 MG/ML
25 INJECTION INTRAMUSCULAR; INTRAVENOUS; SUBCUTANEOUS ONCE AS NEEDED
Status: DISCONTINUED | OUTPATIENT
Start: 2024-05-31 | End: 2024-05-31 | Stop reason: HOSPADM

## 2024-05-31 RX ORDER — FAMOTIDINE 10 MG/ML
20 INJECTION INTRAVENOUS
Status: COMPLETED | OUTPATIENT
Start: 2024-05-31 | End: 2024-05-31

## 2024-05-31 RX ORDER — HEPARIN 100 UNIT/ML
500 SYRINGE INTRAVENOUS
Status: DISCONTINUED | OUTPATIENT
Start: 2024-05-31 | End: 2024-05-31 | Stop reason: HOSPADM

## 2024-05-31 RX ORDER — EPINEPHRINE 0.3 MG/.3ML
0.3 INJECTION SUBCUTANEOUS ONCE AS NEEDED
Status: DISCONTINUED | OUTPATIENT
Start: 2024-05-31 | End: 2024-05-31 | Stop reason: HOSPADM

## 2024-05-31 RX ORDER — SODIUM CHLORIDE 0.9 % (FLUSH) 0.9 %
10 SYRINGE (ML) INJECTION
Status: DISCONTINUED | OUTPATIENT
Start: 2024-05-31 | End: 2024-05-31 | Stop reason: HOSPADM

## 2024-05-31 RX ADMIN — DIPHENHYDRAMINE HYDROCHLORIDE 50 MG: 50 INJECTION, SOLUTION INTRAMUSCULAR; INTRAVENOUS at 12:05

## 2024-05-31 RX ADMIN — DOCETAXEL ANHYDROUS 120 MG: 10 INJECTION, SOLUTION INTRAVENOUS at 02:05

## 2024-05-31 RX ADMIN — CARBOPLATIN 900 MG: 10 INJECTION, SOLUTION INTRAVENOUS at 03:05

## 2024-05-31 RX ADMIN — TRASTUZUMAB-ANNS 560 MG: 420 INJECTION, POWDER, LYOPHILIZED, FOR SOLUTION INTRAVENOUS at 01:05

## 2024-05-31 RX ADMIN — SODIUM CHLORIDE: 9 INJECTION, SOLUTION INTRAVENOUS at 11:05

## 2024-05-31 RX ADMIN — DEXAMETHASONE SODIUM PHOSPHATE 0.25 MG: 4 INJECTION, SOLUTION INTRA-ARTICULAR; INTRALESIONAL; INTRAMUSCULAR; INTRAVENOUS; SOFT TISSUE at 11:05

## 2024-05-31 RX ADMIN — PERTUZUMAB 420 MG: 30 INJECTION, SOLUTION, CONCENTRATE INTRAVENOUS at 01:05

## 2024-05-31 RX ADMIN — FOSAPREPITANT DIMEGLUMINE 150 MG: 150 INJECTION, POWDER, LYOPHILIZED, FOR SOLUTION INTRAVENOUS at 12:05

## 2024-05-31 RX ADMIN — HEPARIN 500 UNITS: 100 SYRINGE at 03:05

## 2024-05-31 RX ADMIN — FAMOTIDINE 20 MG: 10 INJECTION INTRAVENOUS at 12:05

## 2024-05-31 RX ADMIN — SODIUM CHLORIDE, PRESERVATIVE FREE 10 ML: 5 INJECTION INTRAVENOUS at 03:05

## 2024-05-31 NOTE — PLAN OF CARE
1547 Patient tolerated C6D1 Perjeta/ Kanjinti/ Taxotere/ Carboplatin without incident. Seen by Paulina Rivera NP yesterday. Labs from 5/29 reviewed and ok to treat with ANC 1.5 per Paulina Rivera NP. Vitals stable before, during, and after treatment. Port with brisk blood return and flushed without resistance before, during and after infusion, heparin locked and needle removed at discharge.  Pre-medicated per orders prior to Perjeta/Kanjinti due to previous reaction. Patient declined 30 min observation period after Perjeta before Kanjinti. Patient aware of her injection appointment tomorrow. Reports she takes Claritin to prevent bone pain. To contact provider with questions or concerns. D/C ambulatory and stable.

## 2024-06-01 ENCOUNTER — PATIENT MESSAGE (OUTPATIENT)
Dept: ADMINISTRATIVE | Facility: OTHER | Age: 39
End: 2024-06-01
Payer: COMMERCIAL

## 2024-06-01 ENCOUNTER — INFUSION (OUTPATIENT)
Dept: INFUSION THERAPY | Facility: HOSPITAL | Age: 39
End: 2024-06-01
Payer: COMMERCIAL

## 2024-06-01 ENCOUNTER — PATIENT MESSAGE (OUTPATIENT)
Dept: HEMATOLOGY/ONCOLOGY | Facility: CLINIC | Age: 39
End: 2024-06-01
Payer: COMMERCIAL

## 2024-06-01 VITALS
SYSTOLIC BLOOD PRESSURE: 120 MMHG | TEMPERATURE: 98 F | RESPIRATION RATE: 18 BRPM | HEART RATE: 85 BPM | DIASTOLIC BLOOD PRESSURE: 72 MMHG

## 2024-06-01 DIAGNOSIS — Z17.0 MALIGNANT NEOPLASM OF UPPER-OUTER QUADRANT OF RIGHT BREAST IN FEMALE, ESTROGEN RECEPTOR POSITIVE: Primary | ICD-10-CM

## 2024-06-01 DIAGNOSIS — C50.411 MALIGNANT NEOPLASM OF UPPER-OUTER QUADRANT OF RIGHT BREAST IN FEMALE, ESTROGEN RECEPTOR POSITIVE: Primary | ICD-10-CM

## 2024-06-01 PROCEDURE — 63600175 PHARM REV CODE 636 W HCPCS: Mod: JZ,JG | Performed by: STUDENT IN AN ORGANIZED HEALTH CARE EDUCATION/TRAINING PROGRAM

## 2024-06-01 PROCEDURE — 96372 THER/PROPH/DIAG INJ SC/IM: CPT

## 2024-06-01 RX ADMIN — PEGFILGRASTIM-CBQV 6 MG: 6 INJECTION, SOLUTION SUBCUTANEOUS at 11:06

## 2024-06-01 NOTE — NURSING
1132  Udenyca SQ administered, pt tolerated; discussed dietary modifications, increased hydration r/t diarrhea; elevatings legs/reducing dietary sodium r/t edema (pt uses Lasix); monitoring neuropathy, report to MD worsening condition; when to contact MD, when to report to ED; pt verbalized understanding of all discussed and when to report next

## 2024-06-03 ENCOUNTER — PATIENT MESSAGE (OUTPATIENT)
Dept: ADMINISTRATIVE | Facility: OTHER | Age: 39
End: 2024-06-03
Payer: COMMERCIAL

## 2024-06-04 ENCOUNTER — PATIENT MESSAGE (OUTPATIENT)
Dept: ADMINISTRATIVE | Facility: OTHER | Age: 39
End: 2024-06-04
Payer: COMMERCIAL

## 2024-06-04 ENCOUNTER — HOSPITAL ENCOUNTER (OUTPATIENT)
Dept: RADIOLOGY | Facility: OTHER | Age: 39
Discharge: HOME | End: 2024-06-04
Attending: NURSE PRACTITIONER
Payer: COMMERCIAL

## 2024-06-04 DIAGNOSIS — Z17.0 MALIGNANT NEOPLASM OF UPPER-OUTER QUADRANT OF RIGHT BREAST IN FEMALE, ESTROGEN RECEPTOR POSITIVE: ICD-10-CM

## 2024-06-04 DIAGNOSIS — C50.411 MALIGNANT NEOPLASM OF UPPER-OUTER QUADRANT OF RIGHT BREAST IN FEMALE, ESTROGEN RECEPTOR POSITIVE: ICD-10-CM

## 2024-06-04 PROCEDURE — 77049 MRI BREAST C-+ W/CAD BI: CPT | Mod: 26,,, | Performed by: RADIOLOGY

## 2024-06-04 PROCEDURE — 25500020 PHARM REV CODE 255: Performed by: NURSE PRACTITIONER

## 2024-06-04 PROCEDURE — A9577 INJ MULTIHANCE: HCPCS | Performed by: NURSE PRACTITIONER

## 2024-06-04 PROCEDURE — 77049 MRI BREAST C-+ W/CAD BI: CPT | Mod: TC

## 2024-06-04 RX ADMIN — GADOBENATE DIMEGLUMINE 20 ML: 529 INJECTION, SOLUTION INTRAVENOUS at 11:06

## 2024-06-05 ENCOUNTER — PATIENT MESSAGE (OUTPATIENT)
Dept: ADMINISTRATIVE | Facility: OTHER | Age: 39
End: 2024-06-05
Payer: COMMERCIAL

## 2024-06-11 ENCOUNTER — PATIENT MESSAGE (OUTPATIENT)
Dept: HEMATOLOGY/ONCOLOGY | Facility: CLINIC | Age: 39
End: 2024-06-11
Payer: COMMERCIAL

## 2024-06-18 ENCOUNTER — TELEPHONE (OUTPATIENT)
Dept: HEMATOLOGY/ONCOLOGY | Facility: CLINIC | Age: 39
End: 2024-06-18
Payer: COMMERCIAL

## 2024-06-18 ENCOUNTER — PATIENT MESSAGE (OUTPATIENT)
Dept: HEMATOLOGY/ONCOLOGY | Facility: CLINIC | Age: 39
End: 2024-06-18
Payer: COMMERCIAL

## 2024-06-18 DIAGNOSIS — C50.411 MALIGNANT NEOPLASM OF UPPER-OUTER QUADRANT OF RIGHT BREAST IN FEMALE, ESTROGEN RECEPTOR POSITIVE: Primary | ICD-10-CM

## 2024-06-18 DIAGNOSIS — Z17.0 MALIGNANT NEOPLASM OF UPPER-OUTER QUADRANT OF RIGHT BREAST IN FEMALE, ESTROGEN RECEPTOR POSITIVE: Primary | ICD-10-CM

## 2024-06-19 ENCOUNTER — HOSPITAL ENCOUNTER (OUTPATIENT)
Dept: CARDIOLOGY | Facility: HOSPITAL | Age: 39
Discharge: HOME | End: 2024-06-19
Attending: NURSE PRACTITIONER
Payer: COMMERCIAL

## 2024-06-19 ENCOUNTER — HOSPITAL ENCOUNTER (OUTPATIENT)
Dept: CARDIOLOGY | Facility: CLINIC | Age: 39
Discharge: HOME | End: 2024-06-19
Payer: COMMERCIAL

## 2024-06-19 ENCOUNTER — PATIENT MESSAGE (OUTPATIENT)
Dept: ADMINISTRATIVE | Facility: OTHER | Age: 39
End: 2024-06-19
Payer: COMMERCIAL

## 2024-06-19 VITALS — BODY MASS INDEX: 39.27 KG/M2 | HEIGHT: 64 IN | WEIGHT: 230 LBS

## 2024-06-19 DIAGNOSIS — R00.0 TACHYCARDIA: ICD-10-CM

## 2024-06-19 DIAGNOSIS — C50.411 MALIGNANT NEOPLASM OF UPPER-OUTER QUADRANT OF RIGHT BREAST IN FEMALE, ESTROGEN RECEPTOR POSITIVE: ICD-10-CM

## 2024-06-19 DIAGNOSIS — Z17.0 MALIGNANT NEOPLASM OF UPPER-OUTER QUADRANT OF RIGHT BREAST IN FEMALE, ESTROGEN RECEPTOR POSITIVE: Primary | ICD-10-CM

## 2024-06-19 DIAGNOSIS — Z17.0 MALIGNANT NEOPLASM OF UPPER-OUTER QUADRANT OF RIGHT BREAST IN FEMALE, ESTROGEN RECEPTOR POSITIVE: ICD-10-CM

## 2024-06-19 DIAGNOSIS — C50.411 MALIGNANT NEOPLASM OF UPPER-OUTER QUADRANT OF RIGHT BREAST IN FEMALE, ESTROGEN RECEPTOR POSITIVE: Primary | ICD-10-CM

## 2024-06-19 LAB
ASCENDING AORTA: 3.09 CM
AV INDEX (PROSTH): 0.67
AV MEAN GRADIENT: 7 MMHG
AV PEAK GRADIENT: 14 MMHG
AV VALVE AREA BY VELOCITY RATIO: 2.28 CM²
AV VALVE AREA: 2.24 CM²
AV VELOCITY RATIO: 0.68
BSA FOR ECHO PROCEDURE: 2.17 M2
CV ECHO LV RWT: 0.35 CM
DOP CALC AO PEAK VEL: 1.87 M/S
DOP CALC AO VTI: 41.46 CM
DOP CALC LVOT AREA: 3.4 CM2
DOP CALC LVOT DIAMETER: 2.07 CM
DOP CALC LVOT PEAK VEL: 1.27 M/S
DOP CALC LVOT STROKE VOLUME: 92.77 CM3
DOP CALCLVOT PEAK VEL VTI: 27.58 CM
E WAVE DECELERATION TIME: 269.08 MSEC
E/A RATIO: 1
E/E' RATIO: 6.48 M/S
ECHO LV POSTERIOR WALL: 0.74 CM (ref 0.6–1.1)
FRACTIONAL SHORTENING: 45 % (ref 28–44)
GLOBAL LONGITUIDAL STRAIN: 17 %
INTERVENTRICULAR SEPTUM: 0.75 CM (ref 0.6–1.1)
LA MAJOR: 5.5 CM
LA MINOR: 4.62 CM
LA WIDTH: 3.1 CM
LEFT ATRIUM SIZE: 4.04 CM
LEFT ATRIUM VOLUME INDEX MOD: 22.1 ML/M2
LEFT ATRIUM VOLUME INDEX: 25.7 ML/M2
LEFT ATRIUM VOLUME MOD: 46 CM3
LEFT ATRIUM VOLUME: 53.46 CM3
LEFT INTERNAL DIMENSION IN SYSTOLE: 2.32 CM (ref 2.1–4)
LEFT VENTRICLE DIASTOLIC VOLUME INDEX: 38.32 ML/M2
LEFT VENTRICLE DIASTOLIC VOLUME: 79.7 ML
LEFT VENTRICLE MASS INDEX: 45 G/M2
LEFT VENTRICLE SYSTOLIC VOLUME INDEX: 8.9 ML/M2
LEFT VENTRICLE SYSTOLIC VOLUME: 18.48 ML
LEFT VENTRICULAR INTERNAL DIMENSION IN DIASTOLE: 4.23 CM (ref 3.5–6)
LEFT VENTRICULAR MASS: 93.34 G
LV LATERAL E/E' RATIO: 5.67 M/S
LV SEPTAL E/E' RATIO: 7.56 M/S
MV PEAK A VEL: 0.68 M/S
MV PEAK E VEL: 0.68 M/S
MV STENOSIS PRESSURE HALF TIME: 78.03 MS
MV VALVE AREA P 1/2 METHOD: 2.82 CM2
OHS LV EJECTION FRACTION SIMPSONS BIPLANE MOD: 58 %
OHS QRS DURATION: 86 MS
OHS QTC CALCULATION: 450 MS
PISA TR MAX VEL: 1.81 M/S
RA MAJOR: 4.98 CM
RA PRESSURE ESTIMATED: 3 MMHG
RA WIDTH: 3.76 CM
RIGHT VENTRICLE DIASTOLIC BASEL DIMENSION: 3.2 CM
RV TB RVSP: 5 MMHG
SINUS: 3.1 CM
STJ: 2.54 CM
TDI LATERAL: 0.12 M/S
TDI SEPTAL: 0.09 M/S
TDI: 0.11 M/S
TR MAX PG: 13 MMHG
TRICUSPID ANNULAR PLANE SYSTOLIC EXCURSION: 2.16 CM
TV REST PULMONARY ARTERY PRESSURE: 16 MMHG
Z-SCORE OF LEFT VENTRICULAR DIMENSION IN END DIASTOLE: -4.09
Z-SCORE OF LEFT VENTRICULAR DIMENSION IN END SYSTOLE: -4.1

## 2024-06-19 PROCEDURE — 93306 TTE W/DOPPLER COMPLETE: CPT

## 2024-06-19 PROCEDURE — 93010 ELECTROCARDIOGRAM REPORT: CPT | Mod: S$GLB,,, | Performed by: INTERNAL MEDICINE

## 2024-06-19 PROCEDURE — 93356 MYOCRD STRAIN IMG SPCKL TRCK: CPT | Mod: ,,, | Performed by: INTERNAL MEDICINE

## 2024-06-19 PROCEDURE — 93306 TTE W/DOPPLER COMPLETE: CPT | Mod: 26,,, | Performed by: INTERNAL MEDICINE

## 2024-06-19 NOTE — PROGRESS NOTES
[facility]/ [facility]      Chief Complaint:   HR+/Her2+ breast cancer      Cancer Staging   Malignant neoplasm of upper-outer quadrant of right breast in female, estrogen receptor positive  Staging form: Breast, AJCC 8th Edition  - Clinical stage from 2024: Stage IB (cT2, cN0, cM0, G3, ER+, ND+, HER2+) - Signed by Kassie Blakely MD on 2024    HPI:  Madhu Dela Cruz is a 39yo woman who presents today for evaluation of newly diagnosed breast cancer. Oncologic history below:     -2023 Screening mammogram with no evidence of malignancy   -2024 Diagnostic mammogram (palpable right breast mass) with  2.2 x 1.8 x 2.1 cm right breast mass   -2024 Needle biopsy of right breast with IDC grade 3, 8mm in greatest dimension, %, ND 40%, HER2 3+, Ki-67 >90%.  -24 Breast MRI with right breast 2.4 cm x 2.1 cm x 2.3 cm mass at the 3 o'clock position. A smaller benign appearing right breast mass. Normal axillary lymph nodes.   -started neoadjuvant TCHP 2024    Gyn History:   Menarche: Age 10   Menopause: Pre    Age at first pregnancy: 15  : 2nd child, not first  HRT: Was on hormonal birth control, stopped   Does not desire fertility preservation.   Family History: Maternal aunt, diagnosed later in life. No ovarian cancer.     She lives in [facility] with her , they run a photo lubin business.      Interval history: She has completed 6 cycles of TCHP.  She has a sharp pain in the middle of her back, this started 3 days ago, it does not get better with medication.  It is in the upper back area.   Doesn't hurt to take a deep breath.    Hair has started to grow back. Energy is variable.   She is still on the lasix as needed.  Stool is not watery, still not regular.   Fever 1 week ago, has resolved.   Appetite is good.      Notes the metoprolol did decrease her heart rate, 25 mg BID. Feeling okay  "on metoprolol.     Occasional peripheral neuropathy, still intermittent.     Surgery is scheduled July 8.       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    duke's soln (benadryl 30 mL, mylanta 30 mL, LIDOcaine 30 mL, nystatin 30 mL) 120mL 10 mLs, Oral, 4 times daily, Swish and swallow    ergocalciferol, vitamin D2, (VITAMIN D ORAL) No dose, route, or frequency recorded.    furosemide (LASIX) 20 mg, Oral, Daily    LACTOBACILLUS ACIDOPHILUS ORAL No dose, route, or frequency recorded.    LIDOcaine-prilocaine (EMLA) cream Topical (Top), As needed (PRN)    metoprolol tartrate (LOPRESSOR) 25 mg, Oral, 2 times daily    OLANZapine (ZYPREXA) 5 MG tablet Take 1 tablet by mouth nightly on days 1-4 of each chemotherapy cycle.    ondansetron (ZOFRAN) 4 MG tablet No dose, route, or frequency recorded.    oxyCODONE (ROXICODONE) 5 mg, Oral, Every 4 hours PRN    phenazopyridine (PYRIDIUM) 100 mg, Oral, 3 times daily PRN    prochlorperazine (COMPAZINE) 10 mg, Oral, Every 6 hours PRN       Review of Systems:   seeabove        PHYSICAL EXAM:  /74   Pulse 92   Temp 98.1 °F (36.7 °C) (Oral)   Ht 5' 4" (1.626 m)   Wt 107.2 kg (236 lb 5.3 oz)   SpO2 96%   BMI 40.57 kg/m²     ECOG 0  General: well appearing, in no apparent distress  HEENT: Normocephalic, EOMI, anicteric sclerae, MMM  Neck: supple, without cervical or supraclavicular lymphadenopathy.  Heart: regular rate and rhythm, normal S1 and S2, no murmurs, gallops or rubs.  Lungs: Clear to auscultation bilaterally, no increased wob  Breast: difficult to appreciate Rt breast mass at 3oclock  Abdomen: Soft, nontender, nondistended   Extremities: Mild LE edema or  " no joint effusion  Skin: warm, well-perfused, no rash  Neurologic: Alert and oriented x 4, normal speech and gait   Psychiatric: Conversing appropriately with providers throughout today's encounter.    Pertinent Labs & Imaging:  Reviewed recent labs, imaging and pathology.     Assessment & Plan:  Ms. Dela Cruz is a cuco 39yo woman with recently diagnosed cT2N0 HR+/Her2+ breast cancer who presents today for follow up.     Given that her tumor is as least T2N0, and that she is otherwise healthy, have proceeded with neoadjuvant treatment with TCHP. Previously reviewed dosing, schedule and potential side effects. She is now s/p C1 and tolerating fairly well thus far.    #HR+/Her2+ breast cancer:  --Competed 6 cycles of TCHP. Surgery scheduled July 8, will follow up after surgery  --moved premedications before herceptin/perjeta and slowed rate given rigors w cycle 1   -- BRCA testing negative (no pathologic mutations, had to VUS in met and pten genes)  --s/p referral to integrative onc  -- she is seeing Dr. Bermudez and [facility]   --surgery scheduled July 8   -- echo done 6/19/24 - EF 58% - repeat in 3 months   --RTC in 3 weeks as schedued       #Chemo-induced diarrhea: stable on imodium and lomotil  --improved post chemotherapy       #Thrush, hoarseness: resolved  --has duke's soln if needed      #Normocytic anemia: likely 2/2 chemo, pt reports recently finishing heavy menstrual cycle  --will cont to monitor     #Leg swelling  -- 20 mg lasix daily.     All questions were answered to her apparent satisfaction. Will see her back in 3 weeks or sooner should the need arise.    Return to clinic in 6 weeks with MD appointment and labs.     Patient is in agreement with the proposed treatment plan. All questions were answered to the patient's satisfaction. Patient knows to call clinic for any new or worsening symptoms and if anything is needed before the next clinic visit.          Paulina ALY  CRESCENCIO Rivera  Hematology & Medical Oncology   [address] [place] [address]  ph. [phone]  Fax. [phone]    Collaborating physician, Dr. Vaca.    Approximately 15 minutes were spent face-to-face with the patient.  Approximately 25 minutes in total were spent on this encounter, which includes face-to-face time and non-face-to-face time preparing to see the patient (e.g., review of tests), obtaining and/or reviewing separately obtained history, documenting clinical information in the electronic or other health record, independently interpreting results (not separately reported) and communicating results to the patient/family/caregiver, or care coordination (not separately reported).       Route Chart for Scheduling    Med Onc Chart Routing      Follow up with physician 6 weeks. with Dr. vaca   Follow up with GILBERTO    Infusion scheduling note   herpcetin and perjeta in 6 weeks   Injection scheduling note    Labs CBC and CMP   Scheduling:  Preferred lab:  Lab interval:     Imaging    Pharmacy appointment    Other referrals                  Treatment Plan Information   OP BREAST TCHP (pertuzumab trastuzumab DOCEtaxel CARBOplatin) Q3W   Kassie Vaca MD   Upcoming Treatment Dates - OP BREAST TCHP (pertuzumab trastuzumab DOCEtaxel CARBOplatin) Q3W    6/21/2024       Chemotherapy       pertuzumab (PERJETA) 420 mg in sodium chloride 0.9% 264 mL infusion       trastuzumab-anns (KANJINTI) 560 mg in sodium chloride 0.9% 250 mL chemo infusion       Supportive Care       prochlorperazine injection 10 mg       meperidine injection 25 mg       Antiemetics       dexAMETHasone injection 20 mg       famotidine (PF) injection 20 mg       diphenhydrAMINE (BENADRYL) 50 mg in NS 50 mL IVPB  7/12/2024       Chemotherapy       pertuzumab (PERJETA) 420 mg in sodium chloride 0.9% 264 mL infusion       trastuzumab-anns (KANJINTI) 560 mg in sodium chloride 0.9% 250 mL chemo infusion       Supportive Care        prochlorperazine injection 10 mg       meperidine injection 25 mg       Antiemetics       dexAMETHasone injection 20 mg       famotidine (PF) injection 20 mg       diphenhydrAMINE (BENADRYL) 50 mg in NS 50 mL IVPB  8/2/2024       Chemotherapy       pertuzumab (PERJETA) 420 mg in sodium chloride 0.9% 264 mL infusion       trastuzumab-anns (KANJINTI) 560 mg in sodium chloride 0.9% 250 mL chemo infusion       Supportive Care       prochlorperazine injection 10 mg       meperidine injection 25 mg       Antiemetics       dexAMETHasone injection 20 mg       famotidine (PF) injection 20 mg       diphenhydrAMINE (BENADRYL) 50 mg in NS 50 mL IVPB  8/23/2024       Chemotherapy       pertuzumab (PERJETA) 420 mg in sodium chloride 0.9% 264 mL infusion       trastuzumab-anns (KANJINTI) 560 mg in sodium chloride 0.9% 250 mL chemo infusion       Supportive Care       prochlorperazine injection 10 mg       meperidine injection 25 mg       Antiemetics       dexAMETHasone injection 20 mg       famotidine (PF) injection 20 mg       diphenhydrAMINE (BENADRYL) 50 mg in NS 50 mL IVPB    Supportive Plan Information  IV FLUIDS AND ELECTROLYTES   Kassie Blakely MD   Upcoming Treatment Dates - IV FLUIDS AND ELECTROLYTES    No upcoming days in selected categories.      MDM includes  :    - Acute or chronic illness or injury that poses a threat to life or bodily function  - Review of prior external notes from unique source  - Independent review and explanation of 3+ results from unique tests  - Discussion of management and ordering 3+ unique tests  - Extensive discussion of treatment and management  - Prescription drug management  - Drug therapy requiring intensive monitoring for toxicity

## 2024-06-20 ENCOUNTER — PATIENT MESSAGE (OUTPATIENT)
Dept: ADMINISTRATIVE | Facility: OTHER | Age: 39
End: 2024-06-20
Payer: COMMERCIAL

## 2024-06-21 DIAGNOSIS — R35.0 URINARY FREQUENCY: ICD-10-CM

## 2024-06-21 DIAGNOSIS — N39.41 URGENCY INCONTINENCE: Primary | ICD-10-CM

## 2024-06-24 ENCOUNTER — PATIENT MESSAGE (OUTPATIENT)
Dept: ADMINISTRATIVE | Facility: OTHER | Age: 39
End: 2024-06-24
Payer: COMMERCIAL

## 2024-06-26 ENCOUNTER — LAB VISIT (OUTPATIENT)
Dept: LAB | Facility: HOSPITAL | Age: 39
End: 2024-06-26
Attending: STUDENT IN AN ORGANIZED HEALTH CARE EDUCATION/TRAINING PROGRAM
Payer: COMMERCIAL

## 2024-06-26 ENCOUNTER — PATIENT MESSAGE (OUTPATIENT)
Dept: ADMINISTRATIVE | Facility: OTHER | Age: 39
End: 2024-06-26
Payer: COMMERCIAL

## 2024-06-26 ENCOUNTER — OFFICE VISIT (OUTPATIENT)
Dept: HEMATOLOGY/ONCOLOGY | Facility: CLINIC | Age: 39
End: 2024-06-26
Payer: COMMERCIAL

## 2024-06-26 VITALS
SYSTOLIC BLOOD PRESSURE: 117 MMHG | DIASTOLIC BLOOD PRESSURE: 74 MMHG | BODY MASS INDEX: 40.34 KG/M2 | HEIGHT: 64 IN | WEIGHT: 236.31 LBS | TEMPERATURE: 98 F | OXYGEN SATURATION: 96 % | HEART RATE: 92 BPM

## 2024-06-26 DIAGNOSIS — R35.0 URINARY FREQUENCY: ICD-10-CM

## 2024-06-26 DIAGNOSIS — Z17.0 MALIGNANT NEOPLASM OF UPPER-OUTER QUADRANT OF RIGHT BREAST IN FEMALE, ESTROGEN RECEPTOR POSITIVE: Primary | ICD-10-CM

## 2024-06-26 DIAGNOSIS — N39.41 URGENCY INCONTINENCE: ICD-10-CM

## 2024-06-26 DIAGNOSIS — R60.0 LEG EDEMA: ICD-10-CM

## 2024-06-26 DIAGNOSIS — C50.411 MALIGNANT NEOPLASM OF UPPER-OUTER QUADRANT OF RIGHT BREAST IN FEMALE, ESTROGEN RECEPTOR POSITIVE: Primary | ICD-10-CM

## 2024-06-26 LAB
BACTERIA #/AREA URNS AUTO: NORMAL /HPF
BILIRUB UR QL STRIP: NEGATIVE
CLARITY UR REFRACT.AUTO: CLEAR
COLOR UR AUTO: YELLOW
GLUCOSE UR QL STRIP: NEGATIVE
HGB UR QL STRIP: NEGATIVE
KETONES UR QL STRIP: NEGATIVE
LEUKOCYTE ESTERASE UR QL STRIP: ABNORMAL
MICROSCOPIC COMMENT: NORMAL
NITRITE UR QL STRIP: NEGATIVE
PH UR STRIP: 6 [PH] (ref 5–8)
PROT UR QL STRIP: NEGATIVE
RBC #/AREA URNS AUTO: 1 /HPF (ref 0–4)
SP GR UR STRIP: 1.01 (ref 1–1.03)
URN SPEC COLLECT METH UR: ABNORMAL
WBC #/AREA URNS AUTO: 1 /HPF (ref 0–5)

## 2024-06-26 PROCEDURE — 99999 PR PBB SHADOW E&M-EST. PATIENT-LVL IV: CPT | Mod: PBBFAC,,, | Performed by: NURSE PRACTITIONER

## 2024-06-26 PROCEDURE — 3008F BODY MASS INDEX DOCD: CPT | Mod: CPTII,S$GLB,, | Performed by: NURSE PRACTITIONER

## 2024-06-26 PROCEDURE — 99215 OFFICE O/P EST HI 40 MIN: CPT | Mod: S$GLB,,, | Performed by: NURSE PRACTITIONER

## 2024-06-26 PROCEDURE — 1160F RVW MEDS BY RX/DR IN RCRD: CPT | Mod: CPTII,S$GLB,, | Performed by: NURSE PRACTITIONER

## 2024-06-26 PROCEDURE — 3074F SYST BP LT 130 MM HG: CPT | Mod: CPTII,S$GLB,, | Performed by: NURSE PRACTITIONER

## 2024-06-26 PROCEDURE — 3078F DIAST BP <80 MM HG: CPT | Mod: CPTII,S$GLB,, | Performed by: NURSE PRACTITIONER

## 2024-06-26 PROCEDURE — 81001 URINALYSIS AUTO W/SCOPE: CPT | Performed by: STUDENT IN AN ORGANIZED HEALTH CARE EDUCATION/TRAINING PROGRAM

## 2024-06-26 PROCEDURE — 1159F MED LIST DOCD IN RCRD: CPT | Mod: CPTII,S$GLB,, | Performed by: NURSE PRACTITIONER

## 2024-06-26 PROCEDURE — G2211 COMPLEX E/M VISIT ADD ON: HCPCS | Mod: S$GLB,,, | Performed by: NURSE PRACTITIONER

## 2024-06-26 RX ORDER — OXYCODONE HYDROCHLORIDE 5 MG/1
5 TABLET ORAL EVERY 4 HOURS PRN
Qty: 30 TABLET | Refills: 0 | Status: SHIPPED | OUTPATIENT
Start: 2024-06-26

## 2024-06-26 RX ORDER — METHOCARBAMOL 500 MG/1
500 TABLET, FILM COATED ORAL 4 TIMES DAILY
Qty: 40 TABLET | Refills: 0 | Status: SHIPPED | OUTPATIENT
Start: 2024-06-26 | End: 2024-07-06

## 2024-06-26 RX ORDER — FUROSEMIDE 20 MG/1
20 TABLET ORAL DAILY
Qty: 30 TABLET | Refills: 0 | Status: SHIPPED | OUTPATIENT
Start: 2024-06-26 | End: 2024-07-26

## 2024-06-27 ENCOUNTER — PATIENT MESSAGE (OUTPATIENT)
Dept: ADMINISTRATIVE | Facility: OTHER | Age: 39
End: 2024-06-27
Payer: COMMERCIAL

## 2024-06-28 ENCOUNTER — PATIENT MESSAGE (OUTPATIENT)
Dept: ADMINISTRATIVE | Facility: OTHER | Age: 39
End: 2024-06-28
Payer: COMMERCIAL

## 2024-06-29 ENCOUNTER — PATIENT MESSAGE (OUTPATIENT)
Dept: ADMINISTRATIVE | Facility: OTHER | Age: 39
End: 2024-06-29
Payer: COMMERCIAL

## 2024-06-30 ENCOUNTER — PATIENT MESSAGE (OUTPATIENT)
Dept: ADMINISTRATIVE | Facility: OTHER | Age: 39
End: 2024-06-30
Payer: COMMERCIAL

## 2024-07-01 ENCOUNTER — PATIENT MESSAGE (OUTPATIENT)
Dept: ADMINISTRATIVE | Facility: OTHER | Age: 39
End: 2024-07-01
Payer: COMMERCIAL

## 2024-07-04 ENCOUNTER — PATIENT MESSAGE (OUTPATIENT)
Dept: ADMINISTRATIVE | Facility: OTHER | Age: 39
End: 2024-07-04
Payer: COMMERCIAL

## 2024-07-07 ENCOUNTER — PATIENT MESSAGE (OUTPATIENT)
Dept: ADMINISTRATIVE | Facility: OTHER | Age: 39
End: 2024-07-07
Payer: COMMERCIAL

## 2024-08-08 ENCOUNTER — LAB VISIT (OUTPATIENT)
Dept: LAB | Facility: HOSPITAL | Age: 39
End: 2024-08-08
Attending: STUDENT IN AN ORGANIZED HEALTH CARE EDUCATION/TRAINING PROGRAM
Payer: COMMERCIAL

## 2024-08-08 DIAGNOSIS — Z17.0 MALIGNANT NEOPLASM OF UPPER-OUTER QUADRANT OF RIGHT BREAST IN FEMALE, ESTROGEN RECEPTOR POSITIVE: ICD-10-CM

## 2024-08-08 DIAGNOSIS — C50.411 MALIGNANT NEOPLASM OF UPPER-OUTER QUADRANT OF RIGHT BREAST IN FEMALE, ESTROGEN RECEPTOR POSITIVE: ICD-10-CM

## 2024-08-08 LAB
ALBUMIN SERPL BCP-MCNC: 3.1 G/DL (ref 3.5–5.2)
ALP SERPL-CCNC: 79 U/L (ref 55–135)
ALT SERPL W/O P-5'-P-CCNC: 15 U/L (ref 10–44)
ANION GAP SERPL CALC-SCNC: 9 MMOL/L (ref 8–16)
AST SERPL-CCNC: 14 U/L (ref 10–40)
BILIRUB SERPL-MCNC: 0.2 MG/DL (ref 0.1–1)
BUN SERPL-MCNC: 11 MG/DL (ref 6–20)
CALCIUM SERPL-MCNC: 8.9 MG/DL (ref 8.7–10.5)
CHLORIDE SERPL-SCNC: 108 MMOL/L (ref 95–110)
CO2 SERPL-SCNC: 23 MMOL/L (ref 23–29)
CREAT SERPL-MCNC: 0.8 MG/DL (ref 0.5–1.4)
ERYTHROCYTE [DISTWIDTH] IN BLOOD BY AUTOMATED COUNT: 13.7 % (ref 11.5–14.5)
EST. GFR  (NO RACE VARIABLE): >60 ML/MIN/1.73 M^2
GLUCOSE SERPL-MCNC: 111 MG/DL (ref 70–110)
HCT VFR BLD AUTO: 32.8 % (ref 37–48.5)
HGB BLD-MCNC: 10.5 G/DL (ref 12–16)
IMM GRANULOCYTES # BLD AUTO: 0.01 K/UL (ref 0–0.04)
MCH RBC QN AUTO: 28.9 PG (ref 27–31)
MCHC RBC AUTO-ENTMCNC: 32 G/DL (ref 32–36)
MCV RBC AUTO: 90 FL (ref 82–98)
NEUTROPHILS # BLD AUTO: 3.7 K/UL (ref 1.8–7.7)
PLATELET # BLD AUTO: 265 K/UL (ref 150–450)
PMV BLD AUTO: 10.4 FL (ref 9.2–12.9)
POTASSIUM SERPL-SCNC: 4.2 MMOL/L (ref 3.5–5.1)
PROT SERPL-MCNC: 6.9 G/DL (ref 6–8.4)
RBC # BLD AUTO: 3.63 M/UL (ref 4–5.4)
SODIUM SERPL-SCNC: 140 MMOL/L (ref 136–145)
WBC # BLD AUTO: 7.09 K/UL (ref 3.9–12.7)

## 2024-08-08 PROCEDURE — 85027 COMPLETE CBC AUTOMATED: CPT | Performed by: NURSE PRACTITIONER

## 2024-08-08 PROCEDURE — 80053 COMPREHEN METABOLIC PANEL: CPT | Performed by: NURSE PRACTITIONER

## 2024-08-08 PROCEDURE — 36415 COLL VENOUS BLD VENIPUNCTURE: CPT | Mod: PN | Performed by: NURSE PRACTITIONER

## 2024-08-09 ENCOUNTER — INFUSION (OUTPATIENT)
Dept: INFUSION THERAPY | Facility: HOSPITAL | Age: 39
End: 2024-08-09
Payer: COMMERCIAL

## 2024-08-09 ENCOUNTER — OFFICE VISIT (OUTPATIENT)
Dept: HEMATOLOGY/ONCOLOGY | Facility: CLINIC | Age: 39
End: 2024-08-09
Payer: COMMERCIAL

## 2024-08-09 VITALS
WEIGHT: 228.19 LBS | BODY MASS INDEX: 38.96 KG/M2 | HEART RATE: 95 BPM | RESPIRATION RATE: 18 BRPM | TEMPERATURE: 98 F | DIASTOLIC BLOOD PRESSURE: 80 MMHG | OXYGEN SATURATION: 97 % | SYSTOLIC BLOOD PRESSURE: 132 MMHG | HEIGHT: 64 IN

## 2024-08-09 VITALS
DIASTOLIC BLOOD PRESSURE: 74 MMHG | SYSTOLIC BLOOD PRESSURE: 127 MMHG | WEIGHT: 228.19 LBS | BODY MASS INDEX: 38.96 KG/M2 | HEART RATE: 91 BPM | TEMPERATURE: 98 F | OXYGEN SATURATION: 97 % | HEIGHT: 64 IN

## 2024-08-09 DIAGNOSIS — Z17.0 MALIGNANT NEOPLASM OF UPPER-OUTER QUADRANT OF RIGHT BREAST IN FEMALE, ESTROGEN RECEPTOR POSITIVE: Primary | ICD-10-CM

## 2024-08-09 DIAGNOSIS — C50.411 MALIGNANT NEOPLASM OF UPPER-OUTER QUADRANT OF RIGHT BREAST IN FEMALE, ESTROGEN RECEPTOR POSITIVE: Primary | ICD-10-CM

## 2024-08-09 DIAGNOSIS — R60.0 LEG EDEMA: ICD-10-CM

## 2024-08-09 DIAGNOSIS — T45.1X5A CHEMOTHERAPY INDUCED DIARRHEA: ICD-10-CM

## 2024-08-09 DIAGNOSIS — K52.1 CHEMOTHERAPY INDUCED DIARRHEA: ICD-10-CM

## 2024-08-09 PROCEDURE — 96413 CHEMO IV INFUSION 1 HR: CPT

## 2024-08-09 PROCEDURE — 25000003 PHARM REV CODE 250: Performed by: STUDENT IN AN ORGANIZED HEALTH CARE EDUCATION/TRAINING PROGRAM

## 2024-08-09 PROCEDURE — 96368 THER/DIAG CONCURRENT INF: CPT

## 2024-08-09 PROCEDURE — A4216 STERILE WATER/SALINE, 10 ML: HCPCS | Performed by: STUDENT IN AN ORGANIZED HEALTH CARE EDUCATION/TRAINING PROGRAM

## 2024-08-09 PROCEDURE — 99999 PR PBB SHADOW E&M-EST. PATIENT-LVL III: CPT | Mod: PBBFAC,,, | Performed by: STUDENT IN AN ORGANIZED HEALTH CARE EDUCATION/TRAINING PROGRAM

## 2024-08-09 PROCEDURE — 96415 CHEMO IV INFUSION ADDL HR: CPT

## 2024-08-09 PROCEDURE — 63600175 PHARM REV CODE 636 W HCPCS: Performed by: STUDENT IN AN ORGANIZED HEALTH CARE EDUCATION/TRAINING PROGRAM

## 2024-08-09 PROCEDURE — 96367 TX/PROPH/DG ADDL SEQ IV INF: CPT

## 2024-08-09 PROCEDURE — 96375 TX/PRO/DX INJ NEW DRUG ADDON: CPT

## 2024-08-09 RX ORDER — DEXAMETHASONE SODIUM PHOSPHATE 4 MG/ML
20 INJECTION, SOLUTION INTRA-ARTICULAR; INTRALESIONAL; INTRAMUSCULAR; INTRAVENOUS; SOFT TISSUE
Status: CANCELLED
Start: 2024-08-09 | End: 2024-08-09

## 2024-08-09 RX ORDER — PROCHLORPERAZINE EDISYLATE 5 MG/ML
10 INJECTION INTRAMUSCULAR; INTRAVENOUS ONCE AS NEEDED
Status: CANCELLED | OUTPATIENT
Start: 2024-08-09

## 2024-08-09 RX ORDER — DIPHENHYDRAMINE HYDROCHLORIDE 50 MG/ML
50 INJECTION INTRAMUSCULAR; INTRAVENOUS ONCE AS NEEDED
Status: DISCONTINUED | OUTPATIENT
Start: 2024-08-09 | End: 2024-08-09 | Stop reason: HOSPADM

## 2024-08-09 RX ORDER — FAMOTIDINE 10 MG/ML
20 INJECTION INTRAVENOUS
Status: COMPLETED | OUTPATIENT
Start: 2024-08-09 | End: 2024-08-09

## 2024-08-09 RX ORDER — MEPERIDINE HYDROCHLORIDE 50 MG/ML
25 INJECTION INTRAMUSCULAR; INTRAVENOUS; SUBCUTANEOUS ONCE AS NEEDED
Status: CANCELLED | OUTPATIENT
Start: 2024-08-09

## 2024-08-09 RX ORDER — DEXAMETHASONE SODIUM PHOSPHATE 4 MG/ML
20 INJECTION, SOLUTION INTRA-ARTICULAR; INTRALESIONAL; INTRAMUSCULAR; INTRAVENOUS; SOFT TISSUE
Status: DISCONTINUED | OUTPATIENT
Start: 2024-08-09 | End: 2024-08-09 | Stop reason: SDUPTHER

## 2024-08-09 RX ORDER — EPINEPHRINE 0.3 MG/.3ML
0.3 INJECTION SUBCUTANEOUS ONCE AS NEEDED
Status: DISCONTINUED | OUTPATIENT
Start: 2024-08-09 | End: 2024-08-09 | Stop reason: HOSPADM

## 2024-08-09 RX ORDER — FAMOTIDINE 10 MG/ML
20 INJECTION INTRAVENOUS
Status: CANCELLED
Start: 2024-08-09 | End: 2024-08-09

## 2024-08-09 RX ORDER — DIPHENHYDRAMINE HYDROCHLORIDE 50 MG/ML
50 INJECTION INTRAMUSCULAR; INTRAVENOUS ONCE AS NEEDED
Status: CANCELLED | OUTPATIENT
Start: 2024-08-09

## 2024-08-09 RX ORDER — SODIUM CHLORIDE 0.9 % (FLUSH) 0.9 %
10 SYRINGE (ML) INJECTION
Status: CANCELLED | OUTPATIENT
Start: 2024-08-09

## 2024-08-09 RX ORDER — PROCHLORPERAZINE EDISYLATE 5 MG/ML
10 INJECTION INTRAMUSCULAR; INTRAVENOUS ONCE AS NEEDED
Status: DISCONTINUED | OUTPATIENT
Start: 2024-08-09 | End: 2024-08-09 | Stop reason: HOSPADM

## 2024-08-09 RX ORDER — HEPARIN 100 UNIT/ML
500 SYRINGE INTRAVENOUS
Status: DISCONTINUED | OUTPATIENT
Start: 2024-08-09 | End: 2024-08-09 | Stop reason: HOSPADM

## 2024-08-09 RX ORDER — HEPARIN 100 UNIT/ML
500 SYRINGE INTRAVENOUS
Status: CANCELLED | OUTPATIENT
Start: 2024-08-09

## 2024-08-09 RX ORDER — EPINEPHRINE 0.3 MG/.3ML
0.3 INJECTION SUBCUTANEOUS ONCE AS NEEDED
Status: CANCELLED | OUTPATIENT
Start: 2024-08-09

## 2024-08-09 RX ORDER — SODIUM CHLORIDE 0.9 % (FLUSH) 0.9 %
10 SYRINGE (ML) INJECTION
Status: DISCONTINUED | OUTPATIENT
Start: 2024-08-09 | End: 2024-08-09 | Stop reason: HOSPADM

## 2024-08-09 RX ORDER — MEPERIDINE HYDROCHLORIDE 50 MG/ML
25 INJECTION INTRAMUSCULAR; INTRAVENOUS; SUBCUTANEOUS ONCE AS NEEDED
Status: DISCONTINUED | OUTPATIENT
Start: 2024-08-09 | End: 2024-08-09 | Stop reason: HOSPADM

## 2024-08-09 RX ADMIN — HEPARIN 500 UNITS: 100 SYRINGE at 03:08

## 2024-08-09 RX ADMIN — Medication 10 ML: at 03:08

## 2024-08-09 RX ADMIN — DIPHENHYDRAMINE HYDROCHLORIDE 50 MG: 50 INJECTION, SOLUTION INTRAMUSCULAR; INTRAVENOUS at 12:08

## 2024-08-09 RX ADMIN — SODIUM CHLORIDE: 9 INJECTION, SOLUTION INTRAVENOUS at 11:08

## 2024-08-09 RX ADMIN — DEXAMETHASONE SODIUM PHOSPHATE 20 MG: 4 INJECTION, SOLUTION INTRA-ARTICULAR; INTRALESIONAL; INTRAMUSCULAR; INTRAVENOUS; SOFT TISSUE at 12:08

## 2024-08-09 RX ADMIN — TRASTUZUMAB-ANNS 828 MG: 420 INJECTION, POWDER, LYOPHILIZED, FOR SOLUTION INTRAVENOUS at 01:08

## 2024-08-09 RX ADMIN — FAMOTIDINE 20 MG: 10 INJECTION INTRAVENOUS at 12:08

## 2024-08-12 ENCOUNTER — TELEPHONE (OUTPATIENT)
Dept: HEMATOLOGY/ONCOLOGY | Facility: CLINIC | Age: 39
End: 2024-08-12
Payer: COMMERCIAL

## 2024-08-12 DIAGNOSIS — C50.411 MALIGNANT NEOPLASM OF UPPER-OUTER QUADRANT OF RIGHT BREAST IN FEMALE, ESTROGEN RECEPTOR POSITIVE: Primary | ICD-10-CM

## 2024-08-12 DIAGNOSIS — Z17.0 MALIGNANT NEOPLASM OF UPPER-OUTER QUADRANT OF RIGHT BREAST IN FEMALE, ESTROGEN RECEPTOR POSITIVE: Primary | ICD-10-CM

## 2024-08-16 NOTE — PROGRESS NOTES
[facility]/ [facility]      Chief Complaint:   HR+/Her2+ breast cancer      Cancer Staging   Malignant neoplasm of upper-outer quadrant of right breast in female, estrogen receptor positive  Staging form: Breast, AJCC 8th Edition  - Clinical stage from 2024: Stage IB (cT2, cN0, cM0, G3, ER+, DE+, HER2+) - Signed by Kassie Blakely MD on 2024    HPI:  Madhu Dela Cruz is a 39yo woman who presents today for evaluation of newly diagnosed breast cancer. Oncologic history below:     -2023 Screening mammogram with no evidence of malignancy   -2024 Diagnostic mammogram (palpable right breast mass) with  2.2 x 1.8 x 2.1 cm right breast mass   -2024 Needle biopsy of right breast with IDC grade 3, 8mm in greatest dimension, %, DE 40%, HER2 3+, Ki-67 >90%.  -24 Breast MRI with right breast 2.4 cm x 2.1 cm x 2.3 cm mass at the 3 o'clock position. A smaller benign appearing right breast mass. Normal axillary lymph nodes.   -started neoadjuvant TCHP 2024, completed 6 cycles 24  -MRI breast showing resolution of known breast mass   -on 24 she underwent bilateral mastectomies w flap reconstruction; final pathology of Rt breast: no residual carcinoma, 0/4 LN. ypT0N0 (see outside path scanned in)    Gyn History:   Menarche: Age 10   Menopause: Pre    Age at first pregnancy: 15  : 2nd child, not first  HRT: Was on hormonal birth control, stopped   Does not desire fertility preservation.   Family History: Maternal aunt, diagnosed later in life. No ovarian cancer.     She lives in [facility] with her , they run a photo lubin business.      Interval history:   Madhu returns today for follow up. She notes that she is feeling run down. She notes she is not sleeping well.  She had to return to surgery last week due to an area not healing.    She was tearful during visit today.  Appetite is  "good, bowel movements are good help with occasional stool softener.   Denies headaches and dizziness.   Feels she is dropping objects. Peripheral neuropathy in the toes which is still intermittent.         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    duke's soln (benadryl 30 mL, mylanta 30 mL, LIDOcaine 30 mL, nystatin 30 mL) 120mL 10 mLs, Oral, 4 times daily, Swish and swallow    ergocalciferol, vitamin D2, (VITAMIN D ORAL) No dose, route, or frequency recorded.    LACTOBACILLUS ACIDOPHILUS ORAL No dose, route, or frequency recorded.    LIDOcaine-prilocaine (EMLA) cream Topical (Top), As needed (PRN)    metoprolol tartrate (LOPRESSOR) 25 mg, Oral, 2 times daily    OLANZapine (ZYPREXA) 5 MG tablet Take 1 tablet by mouth nightly on days 1-4 of each chemotherapy cycle.    ondansetron (ZOFRAN) 4 MG tablet No dose, route, or frequency recorded.    oxyCODONE (ROXICODONE) 5 mg, Oral, Every 4 hours PRN    phenazopyridine (PYRIDIUM) 100 mg, Oral, 3 times daily PRN    prochlorperazine (COMPAZINE) 10 mg, Oral, Every 6 hours PRN       Review of Systems:         PHYSICAL EXAM:  /69   Pulse 95   Temp 98.5 °F (36.9 °C) (Oral)   Ht 5' 4" (1.626 m)   Wt 106.6 kg (235 lb 0.2 oz)   SpO2 (!) 94%   BMI 40.34 kg/m²     ECOG 0  General: well appearing, in no apparent distress  HEENT: Normocephalic, EOMI, anicteric sclerae, MMM  Heart: well-perfused  Lungs: no increased wob  Breast: s/p bilateral mastectomies w flap reconstruction; incisions healing well. Small area of shallow ulceration beneath Rt breast, minimal drainage  Abdomen: nondistended   Extremities: Mild LE edema or  no joint effusion  Skin: warm, " well-perfused, no rash  Neurologic: Alert and oriented x 4, normal speech and gait   Psychiatric: Conversing appropriately with providers throughout today's encounter. Tearful on exam     Pertinent Labs & Imaging:  Reviewed recent labs, imaging and pathology.     Assessment & Plan:  Ms. Dela Cruz is a cuco 40yo woman with recently diagnosed cT2N0 HR+/Her2+ breast cancer who presents today for follow up.     Given that her tumor is as least T2N0, proceeded with neoadjuvant treatment with TCHP. She completed 6 cycles and is most recent s/p bilateral mastectomies on 7/8/24 demonstrating pCR.    Today we reviewed her final pathology. In the setting of pCR, recommend continuing with trastuzumab only q3 weeks for the remainder of one year. Will plan to add tamoxifen at follow up visit to address HR+ component of her breast cancer. She was in agreement with the above plan.       #HR+/Her2+ breast cancer:  --proceed with trastuzumab q3 weeks as scheduled   -- BRCA testing negative (no pathologic mutations, had to VUS in met and pten genes)  --s/p referral to integrative onc  --echo done 6/19/24 - EF 58% - repeat due 9/2024  --will plan to add tamoxifen at follow up   --RTC in 3 weeks as schedued      #Chemo-induced diarrhea: resolved  --improved post chemotherapy     # chemo induced neuropathy  - started on gabapentin 300 mg by surgery team  - continue as has improved neuropathy     #Thrush, hoarseness: resolved  --has duke's soln if needed    #Normocytic anemia: likely 2/2 chemo, pt reports recently finishing heavy menstrual cycle  --will cont to monitor     #Leg swelling resolved  --cont 20 mg lasix prn     #Anxiety   - started on lexapro 5 mg     Insomnia  - Discussed trazodone  - started on lezapro today - will add trazodone if she is still having insomnia     All questions were answered to her apparent satisfaction. Will see her back in 3 weeks or sooner should the need arise.    Patient is in agreement with the  proposed treatment plan. All questions were answered to the patient's satisfaction. Patient knows to call clinic for any new or worsening symptoms and if anything is needed before the next clinic visit.          Paulina Rivera, FNP-C  Hematology & Medical Oncology   [facility] [place] [address]  ph. [phone]  Fax. [phone]    Collaborating physician, Dr. Blakely.    Approximately 20 minutes were spent face-to-face with the patient.  Approximately 30 minutes in total were spent on this encounter, which includes face-to-face time and non-face-to-face time preparing to see the patient (e.g., review of tests), obtaining and/or reviewing separately obtained history, documenting clinical information in the electronic or other health record, independently interpreting results (not separately reported) and communicating results to the patient/family/caregiver, or care coordination (not separately reported).         Route Chart for Scheduling    Med Onc Chart Routing      Follow up with physician . Cancel appointment wt Dr. Blakely in 3 week and 9 weeks move it ot 10/11 in 6 weeks   Follow up with GILBERTO    Infusion scheduling note    Injection scheduling note    Labs    Imaging    Pharmacy appointment    Other referrals                  Treatment Plan Information   OP BREAST TCHP (pertuzumab trastuzumab DOCEtaxel CARBOplatin) Q3W   Kassie Blakely MD   Upcoming Treatment Dates - OP BREAST TCHP (pertuzumab trastuzumab DOCEtaxel CARBOplatin) Q3W    8/30/2024       Chemotherapy       trastuzumab-anns (KANJINTI) 621 mg in 0.9% NaCl 250 mL chemo infusion       Supportive Care       prochlorperazine injection 10 mg       meperidine injection 25 mg       Antiemetics       dexAMETHasone injection 20 mg       famotidine (PF) injection 20 mg       diphenhydrAMINE (BENADRYL) 50 mg in NS 50 mL IVPB  9/20/2024       Chemotherapy       trastuzumab-anns (KANJINTI) 621 mg in 0.9% NaCl 250 mL chemo infusion        Supportive Care       prochlorperazine injection 10 mg       meperidine injection 25 mg       Antiemetics       dexAMETHasone injection 20 mg       famotidine (PF) injection 20 mg       diphenhydrAMINE (BENADRYL) 50 mg in NS 50 mL IVPB  10/11/2024       Chemotherapy       trastuzumab-anns (KANJINTI) 621 mg in 0.9% NaCl 250 mL chemo infusion       Supportive Care       prochlorperazine injection 10 mg       meperidine injection 25 mg       Antiemetics       dexAMETHasone injection 20 mg       famotidine (PF) injection 20 mg       diphenhydrAMINE (BENADRYL) 50 mg in NS 50 mL IVPB  11/1/2024       Chemotherapy       trastuzumab-anns (KANJINTI) 621 mg in 0.9% NaCl 250 mL chemo infusion       Supportive Care       prochlorperazine injection 10 mg       meperidine injection 25 mg       Antiemetics       dexAMETHasone injection 20 mg       famotidine (PF) injection 20 mg       diphenhydrAMINE (BENADRYL) 50 mg in NS 50 mL IVPB    Supportive Plan Information  IV FLUIDS AND ELECTROLYTES   Kassie Blakely MD   Upcoming Treatment Dates - IV FLUIDS AND ELECTROLYTES    No upcoming days in selected categories.      MDM includes  :    - Acute or chronic illness or injury that poses a threat to life or bodily function  - Review of prior external notes from unique source  - Independent review and explanation of 3+ results from unique tests  - Discussion of management and ordering 3+ unique tests  - Extensive discussion of treatment and management  - Prescription drug management  - Drug therapy requiring intensive monitoring for toxicity

## 2024-08-26 ENCOUNTER — PATIENT MESSAGE (OUTPATIENT)
Dept: HEMATOLOGY/ONCOLOGY | Facility: CLINIC | Age: 39
End: 2024-08-26
Payer: COMMERCIAL

## 2024-08-30 ENCOUNTER — OFFICE VISIT (OUTPATIENT)
Dept: HEMATOLOGY/ONCOLOGY | Facility: CLINIC | Age: 39
End: 2024-08-30
Payer: COMMERCIAL

## 2024-08-30 ENCOUNTER — INFUSION (OUTPATIENT)
Dept: INFUSION THERAPY | Facility: HOSPITAL | Age: 39
End: 2024-08-30
Payer: COMMERCIAL

## 2024-08-30 VITALS
WEIGHT: 235 LBS | HEART RATE: 95 BPM | OXYGEN SATURATION: 94 % | TEMPERATURE: 99 F | SYSTOLIC BLOOD PRESSURE: 133 MMHG | DIASTOLIC BLOOD PRESSURE: 69 MMHG | BODY MASS INDEX: 40.12 KG/M2 | HEIGHT: 64 IN

## 2024-08-30 VITALS — SYSTOLIC BLOOD PRESSURE: 121 MMHG | DIASTOLIC BLOOD PRESSURE: 65 MMHG | HEART RATE: 91 BPM | RESPIRATION RATE: 18 BRPM

## 2024-08-30 DIAGNOSIS — Z17.0 MALIGNANT NEOPLASM OF UPPER-OUTER QUADRANT OF RIGHT BREAST IN FEMALE, ESTROGEN RECEPTOR POSITIVE: Primary | ICD-10-CM

## 2024-08-30 DIAGNOSIS — C50.411 MALIGNANT NEOPLASM OF UPPER-OUTER QUADRANT OF RIGHT BREAST IN FEMALE, ESTROGEN RECEPTOR POSITIVE: Primary | ICD-10-CM

## 2024-08-30 DIAGNOSIS — R45.89 ANXIETY ABOUT MASTECTOMY: ICD-10-CM

## 2024-08-30 PROCEDURE — 96367 TX/PROPH/DG ADDL SEQ IV INF: CPT

## 2024-08-30 PROCEDURE — 96413 CHEMO IV INFUSION 1 HR: CPT

## 2024-08-30 PROCEDURE — 99999 PR PBB SHADOW E&M-EST. PATIENT-LVL IV: CPT | Mod: PBBFAC,,, | Performed by: NURSE PRACTITIONER

## 2024-08-30 PROCEDURE — 63600175 PHARM REV CODE 636 W HCPCS: Performed by: STUDENT IN AN ORGANIZED HEALTH CARE EDUCATION/TRAINING PROGRAM

## 2024-08-30 PROCEDURE — 96375 TX/PRO/DX INJ NEW DRUG ADDON: CPT

## 2024-08-30 PROCEDURE — 25000003 PHARM REV CODE 250: Performed by: STUDENT IN AN ORGANIZED HEALTH CARE EDUCATION/TRAINING PROGRAM

## 2024-08-30 RX ORDER — EPINEPHRINE 0.3 MG/.3ML
0.3 INJECTION SUBCUTANEOUS ONCE AS NEEDED
Status: DISCONTINUED | OUTPATIENT
Start: 2024-08-30 | End: 2024-08-30 | Stop reason: HOSPADM

## 2024-08-30 RX ORDER — PROCHLORPERAZINE EDISYLATE 5 MG/ML
10 INJECTION INTRAMUSCULAR; INTRAVENOUS ONCE AS NEEDED
Status: CANCELLED | OUTPATIENT
Start: 2024-08-30

## 2024-08-30 RX ORDER — HEPARIN 100 UNIT/ML
500 SYRINGE INTRAVENOUS
Status: DISCONTINUED | OUTPATIENT
Start: 2024-08-30 | End: 2024-08-30 | Stop reason: HOSPADM

## 2024-08-30 RX ORDER — DEXAMETHASONE SODIUM PHOSPHATE 4 MG/ML
20 INJECTION, SOLUTION INTRA-ARTICULAR; INTRALESIONAL; INTRAMUSCULAR; INTRAVENOUS; SOFT TISSUE
Status: CANCELLED
Start: 2024-08-30 | End: 2024-08-30

## 2024-08-30 RX ORDER — SODIUM CHLORIDE 0.9 % (FLUSH) 0.9 %
10 SYRINGE (ML) INJECTION
Status: DISCONTINUED | OUTPATIENT
Start: 2024-08-30 | End: 2024-08-30 | Stop reason: HOSPADM

## 2024-08-30 RX ORDER — DIPHENHYDRAMINE HYDROCHLORIDE 50 MG/ML
50 INJECTION INTRAMUSCULAR; INTRAVENOUS ONCE AS NEEDED
Status: CANCELLED | OUTPATIENT
Start: 2024-08-30

## 2024-08-30 RX ORDER — ESCITALOPRAM OXALATE 5 MG/1
5 TABLET ORAL DAILY
Qty: 90 TABLET | Refills: 3 | Status: SHIPPED | OUTPATIENT
Start: 2024-08-30 | End: 2025-08-30

## 2024-08-30 RX ORDER — PROCHLORPERAZINE EDISYLATE 5 MG/ML
10 INJECTION INTRAMUSCULAR; INTRAVENOUS ONCE AS NEEDED
Status: DISCONTINUED | OUTPATIENT
Start: 2024-08-30 | End: 2024-08-30 | Stop reason: HOSPADM

## 2024-08-30 RX ORDER — SODIUM CHLORIDE 0.9 % (FLUSH) 0.9 %
10 SYRINGE (ML) INJECTION
Status: CANCELLED | OUTPATIENT
Start: 2024-08-30

## 2024-08-30 RX ORDER — MEPERIDINE HYDROCHLORIDE 50 MG/ML
25 INJECTION INTRAMUSCULAR; INTRAVENOUS; SUBCUTANEOUS ONCE AS NEEDED
Status: DISCONTINUED | OUTPATIENT
Start: 2024-08-30 | End: 2024-08-30 | Stop reason: HOSPADM

## 2024-08-30 RX ORDER — DEXAMETHASONE SODIUM PHOSPHATE 4 MG/ML
20 INJECTION, SOLUTION INTRA-ARTICULAR; INTRALESIONAL; INTRAMUSCULAR; INTRAVENOUS; SOFT TISSUE
Status: COMPLETED | OUTPATIENT
Start: 2024-08-30 | End: 2024-08-30

## 2024-08-30 RX ORDER — HEPARIN 100 UNIT/ML
500 SYRINGE INTRAVENOUS
Status: CANCELLED | OUTPATIENT
Start: 2024-08-30

## 2024-08-30 RX ORDER — EPINEPHRINE 0.3 MG/.3ML
0.3 INJECTION SUBCUTANEOUS ONCE AS NEEDED
Status: CANCELLED | OUTPATIENT
Start: 2024-08-30

## 2024-08-30 RX ORDER — FAMOTIDINE 10 MG/ML
20 INJECTION INTRAVENOUS
Status: CANCELLED
Start: 2024-08-30 | End: 2024-08-30

## 2024-08-30 RX ORDER — DIPHENHYDRAMINE HYDROCHLORIDE 50 MG/ML
50 INJECTION INTRAMUSCULAR; INTRAVENOUS ONCE AS NEEDED
Status: DISCONTINUED | OUTPATIENT
Start: 2024-08-30 | End: 2024-08-30 | Stop reason: HOSPADM

## 2024-08-30 RX ORDER — FAMOTIDINE 10 MG/ML
20 INJECTION INTRAVENOUS
Status: COMPLETED | OUTPATIENT
Start: 2024-08-30 | End: 2024-08-30

## 2024-08-30 RX ORDER — MEPERIDINE HYDROCHLORIDE 50 MG/ML
25 INJECTION INTRAMUSCULAR; INTRAVENOUS; SUBCUTANEOUS ONCE AS NEEDED
Status: CANCELLED | OUTPATIENT
Start: 2024-08-30

## 2024-08-30 RX ADMIN — SODIUM CHLORIDE: 9 INJECTION, SOLUTION INTRAVENOUS at 11:08

## 2024-08-30 RX ADMIN — DEXAMETHASONE SODIUM PHOSPHATE 20 MG: 4 INJECTION, SOLUTION INTRA-ARTICULAR; INTRALESIONAL; INTRAMUSCULAR; INTRAVENOUS; SOFT TISSUE at 11:08

## 2024-08-30 RX ADMIN — TRASTUZUMAB-ANNS 600 MG: 420 INJECTION, POWDER, LYOPHILIZED, FOR SOLUTION INTRAVENOUS at 11:08

## 2024-08-30 RX ADMIN — FAMOTIDINE 20 MG: 10 INJECTION INTRAVENOUS at 11:08

## 2024-08-30 RX ADMIN — DIPHENHYDRAMINE HYDROCHLORIDE 50 MG: 50 INJECTION, SOLUTION INTRAMUSCULAR; INTRAVENOUS at 11:08

## 2024-08-30 RX ADMIN — HEPARIN 500 UNITS: 100 SYRINGE at 01:08

## 2024-08-30 NOTE — PLAN OF CARE
"Pt ambulatory to clinic alone for Kanjinti infusion. Denies any sig complaints. Port accessed without difficulty. Pt request that Kanjinti infuse over one hour due to "rigors" during first infusion. Tolerated treatment well. Port deaccessed after flushing. Ambulatory from clinic in NAD.   "

## 2024-08-31 ENCOUNTER — PATIENT MESSAGE (OUTPATIENT)
Dept: ADMINISTRATIVE | Facility: OTHER | Age: 39
End: 2024-08-31
Payer: COMMERCIAL

## 2024-09-01 ENCOUNTER — PATIENT MESSAGE (OUTPATIENT)
Dept: ADMINISTRATIVE | Facility: OTHER | Age: 39
End: 2024-09-01
Payer: COMMERCIAL

## 2024-09-02 ENCOUNTER — PATIENT MESSAGE (OUTPATIENT)
Dept: ADMINISTRATIVE | Facility: OTHER | Age: 39
End: 2024-09-02
Payer: COMMERCIAL

## 2024-09-04 ENCOUNTER — PATIENT MESSAGE (OUTPATIENT)
Dept: HEMATOLOGY/ONCOLOGY | Facility: CLINIC | Age: 39
End: 2024-09-04
Payer: COMMERCIAL

## 2024-09-04 DIAGNOSIS — T45.1X5A PERIPHERAL NEUROPATHY DUE TO CHEMOTHERAPY: Primary | ICD-10-CM

## 2024-09-04 DIAGNOSIS — G62.0 PERIPHERAL NEUROPATHY DUE TO CHEMOTHERAPY: Primary | ICD-10-CM

## 2024-09-04 DIAGNOSIS — Z17.0 MALIGNANT NEOPLASM OF UPPER-OUTER QUADRANT OF RIGHT BREAST IN FEMALE, ESTROGEN RECEPTOR POSITIVE: Primary | ICD-10-CM

## 2024-09-04 DIAGNOSIS — C50.411 MALIGNANT NEOPLASM OF UPPER-OUTER QUADRANT OF RIGHT BREAST IN FEMALE, ESTROGEN RECEPTOR POSITIVE: Primary | ICD-10-CM

## 2024-09-04 RX ORDER — TAMOXIFEN CITRATE 20 MG/1
20 TABLET ORAL DAILY
Qty: 30 TABLET | Refills: 11 | Status: SHIPPED | OUTPATIENT
Start: 2024-09-04

## 2024-09-05 ENCOUNTER — PATIENT MESSAGE (OUTPATIENT)
Dept: ADMINISTRATIVE | Facility: OTHER | Age: 39
End: 2024-09-05
Payer: COMMERCIAL

## 2024-09-06 ENCOUNTER — PATIENT MESSAGE (OUTPATIENT)
Dept: ADMINISTRATIVE | Facility: OTHER | Age: 39
End: 2024-09-06
Payer: COMMERCIAL

## 2024-09-07 ENCOUNTER — PATIENT MESSAGE (OUTPATIENT)
Dept: ADMINISTRATIVE | Facility: OTHER | Age: 39
End: 2024-09-07
Payer: COMMERCIAL

## 2024-09-08 ENCOUNTER — PATIENT MESSAGE (OUTPATIENT)
Dept: ADMINISTRATIVE | Facility: OTHER | Age: 39
End: 2024-09-08
Payer: COMMERCIAL

## 2024-09-09 ENCOUNTER — PATIENT MESSAGE (OUTPATIENT)
Dept: ADMINISTRATIVE | Facility: OTHER | Age: 39
End: 2024-09-09
Payer: COMMERCIAL

## 2024-09-09 RX ORDER — GABAPENTIN 300 MG/1
300 CAPSULE ORAL 2 TIMES DAILY
Qty: 60 CAPSULE | Refills: 11 | Status: SHIPPED | OUTPATIENT
Start: 2024-09-09 | End: 2025-09-09

## 2024-09-10 ENCOUNTER — PATIENT MESSAGE (OUTPATIENT)
Dept: ADMINISTRATIVE | Facility: OTHER | Age: 39
End: 2024-09-10
Payer: COMMERCIAL

## 2024-09-11 ENCOUNTER — PATIENT MESSAGE (OUTPATIENT)
Dept: ADMINISTRATIVE | Facility: OTHER | Age: 39
End: 2024-09-11
Payer: COMMERCIAL

## 2024-09-12 ENCOUNTER — PATIENT MESSAGE (OUTPATIENT)
Dept: ADMINISTRATIVE | Facility: OTHER | Age: 39
End: 2024-09-12
Payer: COMMERCIAL

## 2024-09-13 ENCOUNTER — PATIENT MESSAGE (OUTPATIENT)
Dept: ADMINISTRATIVE | Facility: OTHER | Age: 39
End: 2024-09-13
Payer: COMMERCIAL

## 2024-09-13 DIAGNOSIS — C50.411 MALIGNANT NEOPLASM OF UPPER-OUTER QUADRANT OF RIGHT BREAST IN FEMALE, ESTROGEN RECEPTOR POSITIVE: Primary | ICD-10-CM

## 2024-09-13 DIAGNOSIS — Z17.0 MALIGNANT NEOPLASM OF UPPER-OUTER QUADRANT OF RIGHT BREAST IN FEMALE, ESTROGEN RECEPTOR POSITIVE: Primary | ICD-10-CM

## 2024-09-17 DIAGNOSIS — C50.411 MALIGNANT NEOPLASM OF UPPER-OUTER QUADRANT OF RIGHT BREAST IN FEMALE, ESTROGEN RECEPTOR POSITIVE: Primary | ICD-10-CM

## 2024-09-17 DIAGNOSIS — Z17.0 MALIGNANT NEOPLASM OF UPPER-OUTER QUADRANT OF RIGHT BREAST IN FEMALE, ESTROGEN RECEPTOR POSITIVE: Primary | ICD-10-CM

## 2024-09-19 ENCOUNTER — PATIENT MESSAGE (OUTPATIENT)
Dept: ADMINISTRATIVE | Facility: OTHER | Age: 39
End: 2024-09-19
Payer: COMMERCIAL

## 2024-09-19 ENCOUNTER — LAB VISIT (OUTPATIENT)
Dept: LAB | Facility: HOSPITAL | Age: 39
End: 2024-09-19
Attending: NURSE PRACTITIONER
Payer: COMMERCIAL

## 2024-09-19 DIAGNOSIS — C50.411 MALIGNANT NEOPLASM OF UPPER-OUTER QUADRANT OF RIGHT BREAST IN FEMALE, ESTROGEN RECEPTOR POSITIVE: ICD-10-CM

## 2024-09-19 DIAGNOSIS — Z17.0 MALIGNANT NEOPLASM OF UPPER-OUTER QUADRANT OF RIGHT BREAST IN FEMALE, ESTROGEN RECEPTOR POSITIVE: ICD-10-CM

## 2024-09-19 LAB
ALBUMIN SERPL BCP-MCNC: 3.2 G/DL (ref 3.5–5.2)
ALP SERPL-CCNC: 90 U/L (ref 55–135)
ALT SERPL W/O P-5'-P-CCNC: 19 U/L (ref 10–44)
ANION GAP SERPL CALC-SCNC: 8 MMOL/L (ref 8–16)
AST SERPL-CCNC: 19 U/L (ref 10–40)
BASOPHILS # BLD AUTO: 0.01 K/UL (ref 0–0.2)
BASOPHILS NFR BLD: 0.1 % (ref 0–1.9)
BILIRUB SERPL-MCNC: 0.4 MG/DL (ref 0.1–1)
BUN SERPL-MCNC: 13 MG/DL (ref 6–20)
CALCIUM SERPL-MCNC: 9.1 MG/DL (ref 8.7–10.5)
CHLORIDE SERPL-SCNC: 105 MMOL/L (ref 95–110)
CO2 SERPL-SCNC: 25 MMOL/L (ref 23–29)
CREAT SERPL-MCNC: 0.7 MG/DL (ref 0.5–1.4)
DIFFERENTIAL METHOD BLD: ABNORMAL
EOSINOPHIL # BLD AUTO: 0.2 K/UL (ref 0–0.5)
EOSINOPHIL NFR BLD: 2.7 % (ref 0–8)
ERYTHROCYTE [DISTWIDTH] IN BLOOD BY AUTOMATED COUNT: 14.9 % (ref 11.5–14.5)
EST. GFR  (NO RACE VARIABLE): >60 ML/MIN/1.73 M^2
GLUCOSE SERPL-MCNC: 110 MG/DL (ref 70–110)
HCT VFR BLD AUTO: 35.4 % (ref 37–48.5)
HGB BLD-MCNC: 10.7 G/DL (ref 12–16)
IMM GRANULOCYTES # BLD AUTO: 0.02 K/UL (ref 0–0.04)
IMM GRANULOCYTES NFR BLD AUTO: 0.3 % (ref 0–0.5)
LYMPHOCYTES # BLD AUTO: 2.5 K/UL (ref 1–4.8)
LYMPHOCYTES NFR BLD: 31.2 % (ref 18–48)
MCH RBC QN AUTO: 27 PG (ref 27–31)
MCHC RBC AUTO-ENTMCNC: 30.2 G/DL (ref 32–36)
MCV RBC AUTO: 89 FL (ref 82–98)
MONOCYTES # BLD AUTO: 0.9 K/UL (ref 0.3–1)
MONOCYTES NFR BLD: 11 % (ref 4–15)
NEUTROPHILS # BLD AUTO: 4.3 K/UL (ref 1.8–7.7)
NEUTROPHILS NFR BLD: 54.7 % (ref 38–73)
NRBC BLD-RTO: 0 /100 WBC
PLATELET # BLD AUTO: 249 K/UL (ref 150–450)
PMV BLD AUTO: 9.7 FL (ref 9.2–12.9)
POTASSIUM SERPL-SCNC: 4.1 MMOL/L (ref 3.5–5.1)
PROT SERPL-MCNC: 6.8 G/DL (ref 6–8.4)
RBC # BLD AUTO: 3.97 M/UL (ref 4–5.4)
SODIUM SERPL-SCNC: 138 MMOL/L (ref 136–145)
WBC # BLD AUTO: 7.88 K/UL (ref 3.9–12.7)

## 2024-09-19 PROCEDURE — 36415 COLL VENOUS BLD VENIPUNCTURE: CPT | Mod: PN | Performed by: STUDENT IN AN ORGANIZED HEALTH CARE EDUCATION/TRAINING PROGRAM

## 2024-09-19 PROCEDURE — 80053 COMPREHEN METABOLIC PANEL: CPT | Performed by: STUDENT IN AN ORGANIZED HEALTH CARE EDUCATION/TRAINING PROGRAM

## 2024-09-19 PROCEDURE — 85025 COMPLETE CBC W/AUTO DIFF WBC: CPT | Performed by: STUDENT IN AN ORGANIZED HEALTH CARE EDUCATION/TRAINING PROGRAM

## 2024-09-19 RX ORDER — OXYCODONE HYDROCHLORIDE 5 MG/1
5 TABLET ORAL EVERY 4 HOURS PRN
Qty: 30 TABLET | Refills: 0 | Status: SHIPPED | OUTPATIENT
Start: 2024-09-19

## 2024-09-20 ENCOUNTER — HOSPITAL ENCOUNTER (OUTPATIENT)
Dept: CARDIOLOGY | Facility: HOSPITAL | Age: 39
Discharge: HOME | End: 2024-09-20
Attending: STUDENT IN AN ORGANIZED HEALTH CARE EDUCATION/TRAINING PROGRAM
Payer: COMMERCIAL

## 2024-09-20 ENCOUNTER — INFUSION (OUTPATIENT)
Dept: INFUSION THERAPY | Facility: HOSPITAL | Age: 39
End: 2024-09-20
Payer: COMMERCIAL

## 2024-09-20 ENCOUNTER — PATIENT MESSAGE (OUTPATIENT)
Dept: ADMINISTRATIVE | Facility: OTHER | Age: 39
End: 2024-09-20
Payer: COMMERCIAL

## 2024-09-20 VITALS
WEIGHT: 233.69 LBS | HEIGHT: 64 IN | SYSTOLIC BLOOD PRESSURE: 103 MMHG | RESPIRATION RATE: 18 BRPM | TEMPERATURE: 99 F | BODY MASS INDEX: 39.9 KG/M2 | DIASTOLIC BLOOD PRESSURE: 59 MMHG | HEART RATE: 94 BPM

## 2024-09-20 VITALS
WEIGHT: 233.69 LBS | SYSTOLIC BLOOD PRESSURE: 121 MMHG | HEIGHT: 64 IN | HEART RATE: 90 BPM | DIASTOLIC BLOOD PRESSURE: 65 MMHG | BODY MASS INDEX: 39.9 KG/M2

## 2024-09-20 DIAGNOSIS — C50.411 MALIGNANT NEOPLASM OF UPPER-OUTER QUADRANT OF RIGHT BREAST IN FEMALE, ESTROGEN RECEPTOR POSITIVE: Primary | ICD-10-CM

## 2024-09-20 DIAGNOSIS — Z17.0 MALIGNANT NEOPLASM OF UPPER-OUTER QUADRANT OF RIGHT BREAST IN FEMALE, ESTROGEN RECEPTOR POSITIVE: Primary | ICD-10-CM

## 2024-09-20 DIAGNOSIS — Z17.0 MALIGNANT NEOPLASM OF UPPER-OUTER QUADRANT OF RIGHT BREAST IN FEMALE, ESTROGEN RECEPTOR POSITIVE: ICD-10-CM

## 2024-09-20 DIAGNOSIS — C50.411 MALIGNANT NEOPLASM OF UPPER-OUTER QUADRANT OF RIGHT BREAST IN FEMALE, ESTROGEN RECEPTOR POSITIVE: ICD-10-CM

## 2024-09-20 LAB
ASCENDING AORTA: 3.24 CM
AV AREA BY CONTINUOUS VTI: 3.1 CM2
AV INDEX (PROSTH): 0.75
AV LVOT MEAN GRADIENT: 3 MMHG
AV LVOT PEAK GRADIENT: 5 MMHG
AV MEAN GRADIENT: 5 MMHG
AV PEAK GRADIENT: 9 MMHG
AV VALVE AREA BY VELOCITY RATIO: 3.19 CM²
AV VALVE AREA: 3.09 CM2
AV VELOCITY RATIO: 0.78
BSA FOR ECHO PROCEDURE: 2.18 M2
CV ECHO LV RWT: 0.37 CM
DOP CALC AO PEAK VEL: 1.47 M/S
DOP CALC AO VTI: 28.65 CM
DOP CALC LVOT AREA: 4.1 CM2
DOP CALC LVOT DIAMETER: 2.29 CM
DOP CALC LVOT PEAK VEL: 1.14 M/S
DOP CALC LVOT STROKE VOLUME: 88.59 CM3
DOP CALCLVOT PEAK VEL VTI: 21.52 CM
E WAVE DECELERATION TIME: 196.25 MS
E/A RATIO: 1.1
E/E' RATIO: 7 M/S
ECHO EF ESTIMATED: 55 %
ECHO LV POSTERIOR WALL: 0.82 CM (ref 0.6–1.1)
FRACTIONAL SHORTENING: 28 % (ref 28–44)
INTERVENTRICULAR SEPTUM: 0.91 CM (ref 0.6–1.1)
IVC DIAMETER: 1.31 CM
IVRT: 94.2 MS
LA MAJOR: 5.48 CM
LA MINOR: 5.39 CM
LA WIDTH: 3.77 CM
LEFT ATRIUM SIZE: 3.39 CM
LEFT ATRIUM VOLUME INDEX: 28.4 ML/M2
LEFT ATRIUM VOLUME MOD: 50.82 ML
LEFT ATRIUM VOLUME: 59.04 CM3
LEFT INTERNAL DIMENSION IN SYSTOLE: 3.18 CM (ref 2.1–4)
LEFT VENTRICLE DIASTOLIC VOLUME INDEX: 42.95 ML/M2
LEFT VENTRICLE DIASTOLIC VOLUME: 89.33 ML
LEFT VENTRICLE MASS INDEX: 59 G/M2
LEFT VENTRICLE SYSTOLIC VOLUME INDEX: 19.4 ML/M2
LEFT VENTRICLE SYSTOLIC VOLUME: 40.28 ML
LEFT VENTRICULAR INTERNAL DIMENSION IN DIASTOLE: 4.43 CM (ref 3.5–6)
LEFT VENTRICULAR MASS: 122.74 G
LV LATERAL E/E' RATIO: 5.92
LV SEPTAL E/E' RATIO: 8.56
MV PEAK A VEL: 0.7 M/S
MV PEAK E VEL: 0.77 M/S
RA MAJOR: 5.07 CM
RA PRESSURE ESTIMATED: 3 MMHG
RA WIDTH: 3.53 CM
RIGHT ATRIAL AREA: 15.4 CM2
RIGHT VENTRICLE DIASTOLIC BASEL DIMENSION: 2.8 CM
RV TISSUE DOPPLER FREE WALL SYSTOLIC VELOCITY 1 (APICAL 4 CHAMBER VIEW): 10.82 CM/S
SINUS: 3.19 CM
STJ: 2.7 CM
TDI LATERAL: 0.13 M/S
TDI SEPTAL: 0.09 M/S
TDI: 0.11 M/S
TRICUSPID ANNULAR PLANE SYSTOLIC EXCURSION: 1.81 CM
TV PEAK GRADIENT: 8 MMHG
Z-SCORE OF LEFT VENTRICULAR DIMENSION IN END DIASTOLE: -3.63
Z-SCORE OF LEFT VENTRICULAR DIMENSION IN END SYSTOLE: -1.6

## 2024-09-20 PROCEDURE — 96367 TX/PROPH/DG ADDL SEQ IV INF: CPT

## 2024-09-20 PROCEDURE — 25000003 PHARM REV CODE 250: Performed by: STUDENT IN AN ORGANIZED HEALTH CARE EDUCATION/TRAINING PROGRAM

## 2024-09-20 PROCEDURE — 96375 TX/PRO/DX INJ NEW DRUG ADDON: CPT

## 2024-09-20 PROCEDURE — 63600175 PHARM REV CODE 636 W HCPCS: Performed by: STUDENT IN AN ORGANIZED HEALTH CARE EDUCATION/TRAINING PROGRAM

## 2024-09-20 PROCEDURE — 93306 TTE W/DOPPLER COMPLETE: CPT

## 2024-09-20 PROCEDURE — 93306 TTE W/DOPPLER COMPLETE: CPT | Mod: 26,,, | Performed by: INTERNAL MEDICINE

## 2024-09-20 PROCEDURE — 96413 CHEMO IV INFUSION 1 HR: CPT

## 2024-09-20 RX ORDER — PROCHLORPERAZINE EDISYLATE 5 MG/ML
10 INJECTION INTRAMUSCULAR; INTRAVENOUS ONCE AS NEEDED
Status: CANCELLED | OUTPATIENT
Start: 2024-09-20

## 2024-09-20 RX ORDER — DIPHENHYDRAMINE HYDROCHLORIDE 50 MG/ML
50 INJECTION INTRAMUSCULAR; INTRAVENOUS ONCE AS NEEDED
Status: CANCELLED | OUTPATIENT
Start: 2024-09-20

## 2024-09-20 RX ORDER — HEPARIN 100 UNIT/ML
500 SYRINGE INTRAVENOUS
Status: CANCELLED | OUTPATIENT
Start: 2024-09-20

## 2024-09-20 RX ORDER — SODIUM CHLORIDE 0.9 % (FLUSH) 0.9 %
10 SYRINGE (ML) INJECTION
Status: CANCELLED | OUTPATIENT
Start: 2024-09-20

## 2024-09-20 RX ORDER — SODIUM CHLORIDE 0.9 % (FLUSH) 0.9 %
10 SYRINGE (ML) INJECTION
Status: DISCONTINUED | OUTPATIENT
Start: 2024-09-20 | End: 2024-09-20 | Stop reason: HOSPADM

## 2024-09-20 RX ORDER — FAMOTIDINE 10 MG/ML
20 INJECTION INTRAVENOUS
Status: CANCELLED
Start: 2024-09-20 | End: 2024-09-20

## 2024-09-20 RX ORDER — DIPHENHYDRAMINE HYDROCHLORIDE 50 MG/ML
50 INJECTION INTRAMUSCULAR; INTRAVENOUS ONCE AS NEEDED
Status: DISCONTINUED | OUTPATIENT
Start: 2024-09-20 | End: 2024-09-20 | Stop reason: HOSPADM

## 2024-09-20 RX ORDER — HEPARIN 100 UNIT/ML
500 SYRINGE INTRAVENOUS
Status: DISCONTINUED | OUTPATIENT
Start: 2024-09-20 | End: 2024-09-20 | Stop reason: HOSPADM

## 2024-09-20 RX ORDER — DEXAMETHASONE SODIUM PHOSPHATE 4 MG/ML
20 INJECTION, SOLUTION INTRA-ARTICULAR; INTRALESIONAL; INTRAMUSCULAR; INTRAVENOUS; SOFT TISSUE
Status: CANCELLED
Start: 2024-09-20 | End: 2024-09-20

## 2024-09-20 RX ORDER — DEXAMETHASONE SODIUM PHOSPHATE 4 MG/ML
20 INJECTION, SOLUTION INTRA-ARTICULAR; INTRALESIONAL; INTRAMUSCULAR; INTRAVENOUS; SOFT TISSUE
Status: COMPLETED | OUTPATIENT
Start: 2024-09-20 | End: 2024-09-20

## 2024-09-20 RX ORDER — MEPERIDINE HYDROCHLORIDE 50 MG/ML
25 INJECTION INTRAMUSCULAR; INTRAVENOUS; SUBCUTANEOUS ONCE AS NEEDED
Status: CANCELLED | OUTPATIENT
Start: 2024-09-20

## 2024-09-20 RX ORDER — FAMOTIDINE 10 MG/ML
20 INJECTION INTRAVENOUS
Status: COMPLETED | OUTPATIENT
Start: 2024-09-20 | End: 2024-09-20

## 2024-09-20 RX ORDER — EPINEPHRINE 0.3 MG/.3ML
0.3 INJECTION SUBCUTANEOUS ONCE AS NEEDED
Status: CANCELLED | OUTPATIENT
Start: 2024-09-20

## 2024-09-20 RX ORDER — EPINEPHRINE 0.3 MG/.3ML
0.3 INJECTION SUBCUTANEOUS ONCE AS NEEDED
Status: DISCONTINUED | OUTPATIENT
Start: 2024-09-20 | End: 2024-09-20 | Stop reason: HOSPADM

## 2024-09-20 RX ORDER — MEPERIDINE HYDROCHLORIDE 50 MG/ML
25 INJECTION INTRAMUSCULAR; INTRAVENOUS; SUBCUTANEOUS ONCE AS NEEDED
Status: DISCONTINUED | OUTPATIENT
Start: 2024-09-20 | End: 2024-09-20 | Stop reason: HOSPADM

## 2024-09-20 RX ORDER — PROCHLORPERAZINE EDISYLATE 5 MG/ML
10 INJECTION INTRAMUSCULAR; INTRAVENOUS ONCE AS NEEDED
Status: DISCONTINUED | OUTPATIENT
Start: 2024-09-20 | End: 2024-09-20 | Stop reason: HOSPADM

## 2024-09-20 RX ADMIN — FAMOTIDINE 20 MG: 10 INJECTION INTRAVENOUS at 11:09

## 2024-09-20 RX ADMIN — DEXAMETHASONE SODIUM PHOSPHATE 20 MG: 4 INJECTION INTRA-ARTICULAR; INTRALESIONAL; INTRAMUSCULAR; INTRAVENOUS; SOFT TISSUE at 11:09

## 2024-09-20 RX ADMIN — DIPHENHYDRAMINE HYDROCHLORIDE 50 MG: 50 INJECTION, SOLUTION INTRAMUSCULAR; INTRAVENOUS at 11:09

## 2024-09-20 RX ADMIN — SODIUM CHLORIDE: 9 INJECTION, SOLUTION INTRAVENOUS at 10:09

## 2024-09-20 RX ADMIN — SODIUM CHLORIDE 600 MG: 9 INJECTION, SOLUTION INTRAVENOUS at 11:09

## 2024-09-20 RX ADMIN — HEPARIN 500 UNITS: 100 SYRINGE at 01:09

## 2024-09-20 NOTE — PLAN OF CARE
1325-Pt tolerated Kanjinti well today, no complaints or complications. VSS. Pt aware to call provider with any questions or concerns and is aware of upcoming appts. Pt ambulatory from clinic with steady gait, no distress noted.

## 2024-09-21 ENCOUNTER — PATIENT MESSAGE (OUTPATIENT)
Dept: ADMINISTRATIVE | Facility: OTHER | Age: 39
End: 2024-09-21
Payer: COMMERCIAL

## 2024-09-22 ENCOUNTER — PATIENT MESSAGE (OUTPATIENT)
Dept: ADMINISTRATIVE | Facility: OTHER | Age: 39
End: 2024-09-22
Payer: COMMERCIAL

## 2024-09-24 ENCOUNTER — PATIENT MESSAGE (OUTPATIENT)
Dept: ADMINISTRATIVE | Facility: OTHER | Age: 39
End: 2024-09-24
Payer: COMMERCIAL

## 2024-09-25 ENCOUNTER — PATIENT MESSAGE (OUTPATIENT)
Dept: HEMATOLOGY/ONCOLOGY | Facility: CLINIC | Age: 39
End: 2024-09-25
Payer: COMMERCIAL

## 2024-09-26 ENCOUNTER — PATIENT MESSAGE (OUTPATIENT)
Dept: HEMATOLOGY/ONCOLOGY | Facility: CLINIC | Age: 39
End: 2024-09-26
Payer: COMMERCIAL

## 2024-09-26 ENCOUNTER — PATIENT MESSAGE (OUTPATIENT)
Dept: ADMINISTRATIVE | Facility: OTHER | Age: 39
End: 2024-09-26
Payer: COMMERCIAL

## 2024-09-29 ENCOUNTER — PATIENT MESSAGE (OUTPATIENT)
Dept: ADMINISTRATIVE | Facility: OTHER | Age: 39
End: 2024-09-29
Payer: COMMERCIAL

## 2024-10-03 ENCOUNTER — PATIENT MESSAGE (OUTPATIENT)
Dept: ADMINISTRATIVE | Facility: OTHER | Age: 39
End: 2024-10-03
Payer: COMMERCIAL

## 2024-10-04 ENCOUNTER — PATIENT MESSAGE (OUTPATIENT)
Dept: ADMINISTRATIVE | Facility: OTHER | Age: 39
End: 2024-10-04
Payer: COMMERCIAL

## 2024-10-07 ENCOUNTER — PATIENT MESSAGE (OUTPATIENT)
Dept: ADMINISTRATIVE | Facility: OTHER | Age: 39
End: 2024-10-07
Payer: COMMERCIAL

## 2024-10-08 ENCOUNTER — PATIENT MESSAGE (OUTPATIENT)
Dept: ADMINISTRATIVE | Facility: OTHER | Age: 39
End: 2024-10-08
Payer: COMMERCIAL

## 2024-10-09 ENCOUNTER — PATIENT MESSAGE (OUTPATIENT)
Dept: ADMINISTRATIVE | Facility: OTHER | Age: 39
End: 2024-10-09
Payer: COMMERCIAL

## 2024-10-10 ENCOUNTER — LAB VISIT (OUTPATIENT)
Dept: LAB | Facility: HOSPITAL | Age: 39
End: 2024-10-10
Attending: STUDENT IN AN ORGANIZED HEALTH CARE EDUCATION/TRAINING PROGRAM
Payer: COMMERCIAL

## 2024-10-10 DIAGNOSIS — C50.411 MALIGNANT NEOPLASM OF UPPER-OUTER QUADRANT OF RIGHT BREAST IN FEMALE, ESTROGEN RECEPTOR POSITIVE: ICD-10-CM

## 2024-10-10 DIAGNOSIS — Z17.0 MALIGNANT NEOPLASM OF UPPER-OUTER QUADRANT OF RIGHT BREAST IN FEMALE, ESTROGEN RECEPTOR POSITIVE: ICD-10-CM

## 2024-10-10 LAB
ALBUMIN SERPL BCP-MCNC: 3.1 G/DL (ref 3.5–5.2)
ALP SERPL-CCNC: 94 U/L (ref 55–135)
ALT SERPL W/O P-5'-P-CCNC: 15 U/L (ref 10–44)
ANION GAP SERPL CALC-SCNC: 7 MMOL/L (ref 8–16)
AST SERPL-CCNC: 19 U/L (ref 10–40)
BASOPHILS # BLD AUTO: 0.01 K/UL (ref 0–0.2)
BASOPHILS NFR BLD: 0.1 % (ref 0–1.9)
BILIRUB SERPL-MCNC: 0.2 MG/DL (ref 0.1–1)
BUN SERPL-MCNC: 13 MG/DL (ref 6–20)
CALCIUM SERPL-MCNC: 8.7 MG/DL (ref 8.7–10.5)
CHLORIDE SERPL-SCNC: 107 MMOL/L (ref 95–110)
CO2 SERPL-SCNC: 25 MMOL/L (ref 23–29)
CREAT SERPL-MCNC: 0.8 MG/DL (ref 0.5–1.4)
DIFFERENTIAL METHOD BLD: ABNORMAL
EOSINOPHIL # BLD AUTO: 0.2 K/UL (ref 0–0.5)
EOSINOPHIL NFR BLD: 2.2 % (ref 0–8)
ERYTHROCYTE [DISTWIDTH] IN BLOOD BY AUTOMATED COUNT: 15.4 % (ref 11.5–14.5)
EST. GFR  (NO RACE VARIABLE): >60 ML/MIN/1.73 M^2
GLUCOSE SERPL-MCNC: 129 MG/DL (ref 70–110)
HCT VFR BLD AUTO: 37.1 % (ref 37–48.5)
HGB BLD-MCNC: 11.2 G/DL (ref 12–16)
IMM GRANULOCYTES # BLD AUTO: 0.02 K/UL (ref 0–0.04)
IMM GRANULOCYTES NFR BLD AUTO: 0.2 % (ref 0–0.5)
LYMPHOCYTES # BLD AUTO: 1.8 K/UL (ref 1–4.8)
LYMPHOCYTES NFR BLD: 21.7 % (ref 18–48)
MCH RBC QN AUTO: 26.9 PG (ref 27–31)
MCHC RBC AUTO-ENTMCNC: 30.2 G/DL (ref 32–36)
MCV RBC AUTO: 89 FL (ref 82–98)
MONOCYTES # BLD AUTO: 0.6 K/UL (ref 0.3–1)
MONOCYTES NFR BLD: 6.6 % (ref 4–15)
NEUTROPHILS # BLD AUTO: 5.8 K/UL (ref 1.8–7.7)
NEUTROPHILS NFR BLD: 69.2 % (ref 38–73)
NRBC BLD-RTO: 0 /100 WBC
PLATELET # BLD AUTO: 240 K/UL (ref 150–450)
PMV BLD AUTO: 10.3 FL (ref 9.2–12.9)
POTASSIUM SERPL-SCNC: 4.2 MMOL/L (ref 3.5–5.1)
PROT SERPL-MCNC: 6.4 G/DL (ref 6–8.4)
RBC # BLD AUTO: 4.17 M/UL (ref 4–5.4)
SODIUM SERPL-SCNC: 139 MMOL/L (ref 136–145)
WBC # BLD AUTO: 8.37 K/UL (ref 3.9–12.7)

## 2024-10-10 PROCEDURE — 85025 COMPLETE CBC W/AUTO DIFF WBC: CPT | Performed by: STUDENT IN AN ORGANIZED HEALTH CARE EDUCATION/TRAINING PROGRAM

## 2024-10-10 PROCEDURE — 80053 COMPREHEN METABOLIC PANEL: CPT | Performed by: STUDENT IN AN ORGANIZED HEALTH CARE EDUCATION/TRAINING PROGRAM

## 2024-10-10 PROCEDURE — 36415 COLL VENOUS BLD VENIPUNCTURE: CPT | Mod: PN | Performed by: STUDENT IN AN ORGANIZED HEALTH CARE EDUCATION/TRAINING PROGRAM

## 2024-10-11 ENCOUNTER — INFUSION (OUTPATIENT)
Dept: INFUSION THERAPY | Facility: HOSPITAL | Age: 39
End: 2024-10-11
Payer: COMMERCIAL

## 2024-10-11 ENCOUNTER — PATIENT MESSAGE (OUTPATIENT)
Dept: ADMINISTRATIVE | Facility: OTHER | Age: 39
End: 2024-10-11
Payer: COMMERCIAL

## 2024-10-11 VITALS
RESPIRATION RATE: 18 BRPM | DIASTOLIC BLOOD PRESSURE: 64 MMHG | BODY MASS INDEX: 40.48 KG/M2 | HEART RATE: 85 BPM | HEIGHT: 64 IN | SYSTOLIC BLOOD PRESSURE: 120 MMHG | TEMPERATURE: 98 F | WEIGHT: 237.13 LBS | OXYGEN SATURATION: 97 %

## 2024-10-11 DIAGNOSIS — Z17.0 MALIGNANT NEOPLASM OF UPPER-OUTER QUADRANT OF RIGHT BREAST IN FEMALE, ESTROGEN RECEPTOR POSITIVE: Primary | ICD-10-CM

## 2024-10-11 DIAGNOSIS — C50.411 MALIGNANT NEOPLASM OF UPPER-OUTER QUADRANT OF RIGHT BREAST IN FEMALE, ESTROGEN RECEPTOR POSITIVE: Primary | ICD-10-CM

## 2024-10-11 PROCEDURE — 96367 TX/PROPH/DG ADDL SEQ IV INF: CPT

## 2024-10-11 PROCEDURE — A4216 STERILE WATER/SALINE, 10 ML: HCPCS | Performed by: STUDENT IN AN ORGANIZED HEALTH CARE EDUCATION/TRAINING PROGRAM

## 2024-10-11 PROCEDURE — 96413 CHEMO IV INFUSION 1 HR: CPT

## 2024-10-11 PROCEDURE — 96375 TX/PRO/DX INJ NEW DRUG ADDON: CPT

## 2024-10-11 PROCEDURE — 63600175 PHARM REV CODE 636 W HCPCS: Performed by: STUDENT IN AN ORGANIZED HEALTH CARE EDUCATION/TRAINING PROGRAM

## 2024-10-11 PROCEDURE — 25000003 PHARM REV CODE 250: Performed by: STUDENT IN AN ORGANIZED HEALTH CARE EDUCATION/TRAINING PROGRAM

## 2024-10-11 RX ORDER — FAMOTIDINE 10 MG/ML
20 INJECTION INTRAVENOUS
Status: COMPLETED | OUTPATIENT
Start: 2024-10-11 | End: 2024-10-11

## 2024-10-11 RX ORDER — SODIUM CHLORIDE 0.9 % (FLUSH) 0.9 %
10 SYRINGE (ML) INJECTION
Status: CANCELLED | OUTPATIENT
Start: 2024-10-11

## 2024-10-11 RX ORDER — MEPERIDINE HYDROCHLORIDE 50 MG/ML
25 INJECTION INTRAMUSCULAR; INTRAVENOUS; SUBCUTANEOUS ONCE AS NEEDED
Status: DISCONTINUED | OUTPATIENT
Start: 2024-10-11 | End: 2024-10-11 | Stop reason: HOSPADM

## 2024-10-11 RX ORDER — DEXAMETHASONE SODIUM PHOSPHATE 4 MG/ML
20 INJECTION, SOLUTION INTRA-ARTICULAR; INTRALESIONAL; INTRAMUSCULAR; INTRAVENOUS; SOFT TISSUE
Status: COMPLETED | OUTPATIENT
Start: 2024-10-11 | End: 2024-10-11

## 2024-10-11 RX ORDER — PROCHLORPERAZINE EDISYLATE 5 MG/ML
10 INJECTION INTRAMUSCULAR; INTRAVENOUS ONCE AS NEEDED
Status: CANCELLED | OUTPATIENT
Start: 2024-10-11

## 2024-10-11 RX ORDER — DIPHENHYDRAMINE HYDROCHLORIDE 50 MG/ML
50 INJECTION INTRAMUSCULAR; INTRAVENOUS ONCE AS NEEDED
Status: CANCELLED | OUTPATIENT
Start: 2024-10-11

## 2024-10-11 RX ORDER — DEXAMETHASONE SODIUM PHOSPHATE 4 MG/ML
20 INJECTION, SOLUTION INTRA-ARTICULAR; INTRALESIONAL; INTRAMUSCULAR; INTRAVENOUS; SOFT TISSUE
Status: CANCELLED
Start: 2024-10-11 | End: 2024-10-11

## 2024-10-11 RX ORDER — DIPHENHYDRAMINE HYDROCHLORIDE 50 MG/ML
50 INJECTION INTRAMUSCULAR; INTRAVENOUS ONCE AS NEEDED
Status: DISCONTINUED | OUTPATIENT
Start: 2024-10-11 | End: 2024-10-11 | Stop reason: HOSPADM

## 2024-10-11 RX ORDER — PROCHLORPERAZINE EDISYLATE 5 MG/ML
10 INJECTION INTRAMUSCULAR; INTRAVENOUS ONCE AS NEEDED
Status: DISCONTINUED | OUTPATIENT
Start: 2024-10-11 | End: 2024-10-11 | Stop reason: HOSPADM

## 2024-10-11 RX ORDER — MEPERIDINE HYDROCHLORIDE 50 MG/ML
25 INJECTION INTRAMUSCULAR; INTRAVENOUS; SUBCUTANEOUS ONCE AS NEEDED
Status: CANCELLED | OUTPATIENT
Start: 2024-10-11

## 2024-10-11 RX ORDER — FAMOTIDINE 10 MG/ML
20 INJECTION INTRAVENOUS
Status: CANCELLED
Start: 2024-10-11 | End: 2024-10-11

## 2024-10-11 RX ORDER — HEPARIN 100 UNIT/ML
500 SYRINGE INTRAVENOUS
Status: CANCELLED | OUTPATIENT
Start: 2024-10-11

## 2024-10-11 RX ORDER — HEPARIN 100 UNIT/ML
500 SYRINGE INTRAVENOUS
Status: DISCONTINUED | OUTPATIENT
Start: 2024-10-11 | End: 2024-10-11 | Stop reason: HOSPADM

## 2024-10-11 RX ORDER — SODIUM CHLORIDE 0.9 % (FLUSH) 0.9 %
10 SYRINGE (ML) INJECTION
Status: DISCONTINUED | OUTPATIENT
Start: 2024-10-11 | End: 2024-10-11 | Stop reason: HOSPADM

## 2024-10-11 RX ORDER — EPINEPHRINE 0.3 MG/.3ML
0.3 INJECTION SUBCUTANEOUS ONCE AS NEEDED
Status: CANCELLED | OUTPATIENT
Start: 2024-10-11

## 2024-10-11 RX ORDER — EPINEPHRINE 0.3 MG/.3ML
0.3 INJECTION SUBCUTANEOUS ONCE AS NEEDED
Status: DISCONTINUED | OUTPATIENT
Start: 2024-10-11 | End: 2024-10-11 | Stop reason: HOSPADM

## 2024-10-11 RX ADMIN — FAMOTIDINE 20 MG: 10 INJECTION INTRAVENOUS at 11:10

## 2024-10-11 RX ADMIN — DEXAMETHASONE SODIUM PHOSPHATE 20 MG: 4 INJECTION INTRA-ARTICULAR; INTRALESIONAL; INTRAMUSCULAR; INTRAVENOUS; SOFT TISSUE at 11:10

## 2024-10-11 RX ADMIN — HEPARIN 500 UNITS: 100 SYRINGE at 01:10

## 2024-10-11 RX ADMIN — DIPHENHYDRAMINE HYDROCHLORIDE 50 MG: 50 INJECTION, SOLUTION INTRAMUSCULAR; INTRAVENOUS at 11:10

## 2024-10-11 RX ADMIN — SODIUM CHLORIDE 600 MG: 9 INJECTION, SOLUTION INTRAVENOUS at 12:10

## 2024-10-11 RX ADMIN — Medication 10 ML: at 01:10

## 2024-10-11 RX ADMIN — SODIUM CHLORIDE: 9 INJECTION, SOLUTION INTRAVENOUS at 11:10

## 2024-10-11 NOTE — PLAN OF CARE
1345 - Pt tolerated Kanjinti nfusion well, no complaints or complications reported - infusion administered over 60 minutes due to previous reaction with 30 minute infusion involving rigors. VSS throughout treatment. Positive blood return noted from port; port heparin locked and deaccessed. Pt aware to call provider with any questions or concerns, aware of upcoming appts, ambulatory from clinic independently with steady gait, no distress noted.

## 2024-10-11 NOTE — PLAN OF CARE
1130 - Labs, hx, and medications reviewed, pt meets parameters for treatment today. Assessment completed and plan of care reviewed. Pt verbalized understanding. PAC accessed without complications. Pt voices no new complaints or concerns, will continue to monitor for safety.

## 2024-10-12 ENCOUNTER — PATIENT MESSAGE (OUTPATIENT)
Dept: ADMINISTRATIVE | Facility: OTHER | Age: 39
End: 2024-10-12
Payer: COMMERCIAL

## 2024-10-14 ENCOUNTER — PATIENT MESSAGE (OUTPATIENT)
Dept: ADMINISTRATIVE | Facility: OTHER | Age: 39
End: 2024-10-14
Payer: COMMERCIAL

## 2024-10-15 ENCOUNTER — PATIENT MESSAGE (OUTPATIENT)
Dept: ADMINISTRATIVE | Facility: OTHER | Age: 39
End: 2024-10-15
Payer: COMMERCIAL

## 2024-10-21 ENCOUNTER — PATIENT MESSAGE (OUTPATIENT)
Dept: ADMINISTRATIVE | Facility: OTHER | Age: 39
End: 2024-10-21
Payer: COMMERCIAL

## 2024-10-22 ENCOUNTER — PATIENT MESSAGE (OUTPATIENT)
Dept: ADMINISTRATIVE | Facility: OTHER | Age: 39
End: 2024-10-22
Payer: COMMERCIAL

## 2024-10-23 ENCOUNTER — PATIENT MESSAGE (OUTPATIENT)
Dept: ADMINISTRATIVE | Facility: OTHER | Age: 39
End: 2024-10-23
Payer: COMMERCIAL

## 2024-10-26 ENCOUNTER — PATIENT MESSAGE (OUTPATIENT)
Dept: HEMATOLOGY/ONCOLOGY | Facility: CLINIC | Age: 39
End: 2024-10-26
Payer: COMMERCIAL

## 2024-10-26 DIAGNOSIS — R06.02 SOB (SHORTNESS OF BREATH) ON EXERTION: Primary | ICD-10-CM

## 2024-10-28 ENCOUNTER — HOSPITAL ENCOUNTER (OUTPATIENT)
Dept: RADIOLOGY | Facility: HOSPITAL | Age: 39
Discharge: HOME | End: 2024-10-28
Attending: NURSE PRACTITIONER
Payer: COMMERCIAL

## 2024-10-28 DIAGNOSIS — R06.02 SOB (SHORTNESS OF BREATH) ON EXERTION: ICD-10-CM

## 2024-10-28 PROCEDURE — 71046 X-RAY EXAM CHEST 2 VIEWS: CPT | Mod: 26,,, | Performed by: RADIOLOGY

## 2024-10-28 PROCEDURE — 71046 X-RAY EXAM CHEST 2 VIEWS: CPT | Mod: TC,PN

## 2024-11-01 ENCOUNTER — LAB VISIT (OUTPATIENT)
Dept: LAB | Facility: HOSPITAL | Age: 39
End: 2024-11-01
Attending: STUDENT IN AN ORGANIZED HEALTH CARE EDUCATION/TRAINING PROGRAM
Payer: COMMERCIAL

## 2024-11-01 ENCOUNTER — OFFICE VISIT (OUTPATIENT)
Dept: HEMATOLOGY/ONCOLOGY | Facility: CLINIC | Age: 39
End: 2024-11-01
Payer: COMMERCIAL

## 2024-11-01 ENCOUNTER — INFUSION (OUTPATIENT)
Dept: INFUSION THERAPY | Facility: HOSPITAL | Age: 39
End: 2024-11-01
Payer: COMMERCIAL

## 2024-11-01 VITALS
SYSTOLIC BLOOD PRESSURE: 106 MMHG | BODY MASS INDEX: 41.85 KG/M2 | OXYGEN SATURATION: 95 % | HEIGHT: 64 IN | RESPIRATION RATE: 20 BRPM | WEIGHT: 245.13 LBS | HEART RATE: 84 BPM | DIASTOLIC BLOOD PRESSURE: 72 MMHG

## 2024-11-01 VITALS
BODY MASS INDEX: 41.85 KG/M2 | WEIGHT: 245.13 LBS | SYSTOLIC BLOOD PRESSURE: 120 MMHG | RESPIRATION RATE: 18 BRPM | HEART RATE: 84 BPM | HEIGHT: 64 IN | DIASTOLIC BLOOD PRESSURE: 64 MMHG | TEMPERATURE: 99 F

## 2024-11-01 DIAGNOSIS — R45.89 ANXIETY ABOUT MASTECTOMY: ICD-10-CM

## 2024-11-01 DIAGNOSIS — Z17.0 MALIGNANT NEOPLASM OF UPPER-OUTER QUADRANT OF RIGHT BREAST IN FEMALE, ESTROGEN RECEPTOR POSITIVE: Primary | ICD-10-CM

## 2024-11-01 DIAGNOSIS — T45.1X5A PERIPHERAL NEUROPATHY DUE TO CHEMOTHERAPY: ICD-10-CM

## 2024-11-01 DIAGNOSIS — Z17.0 MALIGNANT NEOPLASM OF UPPER-OUTER QUADRANT OF RIGHT BREAST IN FEMALE, ESTROGEN RECEPTOR POSITIVE: ICD-10-CM

## 2024-11-01 DIAGNOSIS — C50.411 MALIGNANT NEOPLASM OF UPPER-OUTER QUADRANT OF RIGHT BREAST IN FEMALE, ESTROGEN RECEPTOR POSITIVE: ICD-10-CM

## 2024-11-01 DIAGNOSIS — G62.0 PERIPHERAL NEUROPATHY DUE TO CHEMOTHERAPY: ICD-10-CM

## 2024-11-01 DIAGNOSIS — M25.50 ARTHRALGIA, UNSPECIFIED JOINT: Primary | ICD-10-CM

## 2024-11-01 DIAGNOSIS — C50.411 MALIGNANT NEOPLASM OF UPPER-OUTER QUADRANT OF RIGHT BREAST IN FEMALE, ESTROGEN RECEPTOR POSITIVE: Primary | ICD-10-CM

## 2024-11-01 DIAGNOSIS — R60.0 LEG EDEMA: ICD-10-CM

## 2024-11-01 LAB
ALBUMIN SERPL BCP-MCNC: 3.1 G/DL (ref 3.5–5.2)
ALP SERPL-CCNC: 96 U/L (ref 40–150)
ALT SERPL W/O P-5'-P-CCNC: 20 U/L (ref 10–44)
ANION GAP SERPL CALC-SCNC: 8 MMOL/L (ref 8–16)
AST SERPL-CCNC: 18 U/L (ref 10–40)
BASOPHILS # BLD AUTO: 0.02 K/UL (ref 0–0.2)
BASOPHILS NFR BLD: 0.3 % (ref 0–1.9)
BILIRUB SERPL-MCNC: 0.3 MG/DL (ref 0.1–1)
BUN SERPL-MCNC: 16 MG/DL (ref 6–20)
CALCIUM SERPL-MCNC: 8.6 MG/DL (ref 8.7–10.5)
CHLORIDE SERPL-SCNC: 107 MMOL/L (ref 95–110)
CO2 SERPL-SCNC: 23 MMOL/L (ref 23–29)
CREAT SERPL-MCNC: 0.7 MG/DL (ref 0.5–1.4)
DIFFERENTIAL METHOD BLD: ABNORMAL
EOSINOPHIL # BLD AUTO: 0.2 K/UL (ref 0–0.5)
EOSINOPHIL NFR BLD: 3.4 % (ref 0–8)
ERYTHROCYTE [DISTWIDTH] IN BLOOD BY AUTOMATED COUNT: 15.7 % (ref 11.5–14.5)
EST. GFR  (NO RACE VARIABLE): >60 ML/MIN/1.73 M^2
GLUCOSE SERPL-MCNC: 125 MG/DL (ref 70–110)
HCT VFR BLD AUTO: 36.5 % (ref 37–48.5)
HGB BLD-MCNC: 11.7 G/DL (ref 12–16)
IMM GRANULOCYTES # BLD AUTO: 0.02 K/UL (ref 0–0.04)
IMM GRANULOCYTES NFR BLD AUTO: 0.3 % (ref 0–0.5)
LYMPHOCYTES # BLD AUTO: 1.9 K/UL (ref 1–4.8)
LYMPHOCYTES NFR BLD: 32.3 % (ref 18–48)
MCH RBC QN AUTO: 27.5 PG (ref 27–31)
MCHC RBC AUTO-ENTMCNC: 32.1 G/DL (ref 32–36)
MCV RBC AUTO: 86 FL (ref 82–98)
MONOCYTES # BLD AUTO: 0.6 K/UL (ref 0.3–1)
MONOCYTES NFR BLD: 10.8 % (ref 4–15)
NEUTROPHILS # BLD AUTO: 3.1 K/UL (ref 1.8–7.7)
NEUTROPHILS NFR BLD: 52.9 % (ref 38–73)
NRBC BLD-RTO: 0 /100 WBC
PLATELET # BLD AUTO: 211 K/UL (ref 150–450)
PMV BLD AUTO: 9.2 FL (ref 9.2–12.9)
POTASSIUM SERPL-SCNC: 4 MMOL/L (ref 3.5–5.1)
PROT SERPL-MCNC: 6.6 G/DL (ref 6–8.4)
RBC # BLD AUTO: 4.26 M/UL (ref 4–5.4)
SODIUM SERPL-SCNC: 138 MMOL/L (ref 136–145)
WBC # BLD AUTO: 5.91 K/UL (ref 3.9–12.7)

## 2024-11-01 PROCEDURE — 96413 CHEMO IV INFUSION 1 HR: CPT

## 2024-11-01 PROCEDURE — 63600175 PHARM REV CODE 636 W HCPCS: Performed by: STUDENT IN AN ORGANIZED HEALTH CARE EDUCATION/TRAINING PROGRAM

## 2024-11-01 PROCEDURE — A4216 STERILE WATER/SALINE, 10 ML: HCPCS | Performed by: STUDENT IN AN ORGANIZED HEALTH CARE EDUCATION/TRAINING PROGRAM

## 2024-11-01 PROCEDURE — 96375 TX/PRO/DX INJ NEW DRUG ADDON: CPT

## 2024-11-01 PROCEDURE — 96367 TX/PROPH/DG ADDL SEQ IV INF: CPT

## 2024-11-01 PROCEDURE — 36415 COLL VENOUS BLD VENIPUNCTURE: CPT | Performed by: STUDENT IN AN ORGANIZED HEALTH CARE EDUCATION/TRAINING PROGRAM

## 2024-11-01 PROCEDURE — 99999 PR PBB SHADOW E&M-EST. PATIENT-LVL IV: CPT | Mod: PBBFAC,,, | Performed by: STUDENT IN AN ORGANIZED HEALTH CARE EDUCATION/TRAINING PROGRAM

## 2024-11-01 PROCEDURE — 25000003 PHARM REV CODE 250: Performed by: STUDENT IN AN ORGANIZED HEALTH CARE EDUCATION/TRAINING PROGRAM

## 2024-11-01 PROCEDURE — 80053 COMPREHEN METABOLIC PANEL: CPT | Performed by: STUDENT IN AN ORGANIZED HEALTH CARE EDUCATION/TRAINING PROGRAM

## 2024-11-01 PROCEDURE — 85025 COMPLETE CBC W/AUTO DIFF WBC: CPT | Performed by: STUDENT IN AN ORGANIZED HEALTH CARE EDUCATION/TRAINING PROGRAM

## 2024-11-01 RX ORDER — SODIUM CHLORIDE 0.9 % (FLUSH) 0.9 %
10 SYRINGE (ML) INJECTION
Status: CANCELLED | OUTPATIENT
Start: 2024-11-01

## 2024-11-01 RX ORDER — EPINEPHRINE 0.3 MG/.3ML
0.3 INJECTION SUBCUTANEOUS ONCE AS NEEDED
Status: CANCELLED | OUTPATIENT
Start: 2024-11-01

## 2024-11-01 RX ORDER — FAMOTIDINE 10 MG/ML
20 INJECTION INTRAVENOUS
Status: COMPLETED | OUTPATIENT
Start: 2024-11-01 | End: 2024-11-01

## 2024-11-01 RX ORDER — DEXAMETHASONE SODIUM PHOSPHATE 4 MG/ML
20 INJECTION, SOLUTION INTRA-ARTICULAR; INTRALESIONAL; INTRAMUSCULAR; INTRAVENOUS; SOFT TISSUE
Status: DISCONTINUED | OUTPATIENT
Start: 2024-11-01 | End: 2024-11-01 | Stop reason: SDUPTHER

## 2024-11-01 RX ORDER — PROCHLORPERAZINE EDISYLATE 5 MG/ML
10 INJECTION INTRAMUSCULAR; INTRAVENOUS ONCE AS NEEDED
Status: DISCONTINUED | OUTPATIENT
Start: 2024-11-01 | End: 2024-11-01 | Stop reason: HOSPADM

## 2024-11-01 RX ORDER — DIPHENHYDRAMINE HYDROCHLORIDE 50 MG/ML
50 INJECTION INTRAMUSCULAR; INTRAVENOUS ONCE AS NEEDED
Status: DISCONTINUED | OUTPATIENT
Start: 2024-11-01 | End: 2024-11-01 | Stop reason: HOSPADM

## 2024-11-01 RX ORDER — DIPHENHYDRAMINE HYDROCHLORIDE 50 MG/ML
50 INJECTION INTRAMUSCULAR; INTRAVENOUS ONCE AS NEEDED
Status: CANCELLED | OUTPATIENT
Start: 2024-11-01

## 2024-11-01 RX ORDER — KETOROLAC TROMETHAMINE 10 MG/1
10 TABLET, FILM COATED ORAL EVERY 6 HOURS PRN
Qty: 60 TABLET | Refills: 1 | Status: SHIPPED | OUTPATIENT
Start: 2024-11-01 | End: 2024-12-01

## 2024-11-01 RX ORDER — HEPARIN 100 UNIT/ML
500 SYRINGE INTRAVENOUS
Status: CANCELLED | OUTPATIENT
Start: 2024-11-01

## 2024-11-01 RX ORDER — FAMOTIDINE 10 MG/ML
20 INJECTION INTRAVENOUS
Status: CANCELLED
Start: 2024-11-01 | End: 2024-11-01

## 2024-11-01 RX ORDER — HEPARIN 100 UNIT/ML
500 SYRINGE INTRAVENOUS
Status: DISCONTINUED | OUTPATIENT
Start: 2024-11-01 | End: 2024-11-01 | Stop reason: HOSPADM

## 2024-11-01 RX ORDER — DEXAMETHASONE SODIUM PHOSPHATE 4 MG/ML
20 INJECTION, SOLUTION INTRA-ARTICULAR; INTRALESIONAL; INTRAMUSCULAR; INTRAVENOUS; SOFT TISSUE
Status: CANCELLED
Start: 2024-11-01 | End: 2024-11-01

## 2024-11-01 RX ORDER — MEPERIDINE HYDROCHLORIDE 50 MG/ML
25 INJECTION INTRAMUSCULAR; INTRAVENOUS; SUBCUTANEOUS ONCE AS NEEDED
Status: CANCELLED | OUTPATIENT
Start: 2024-11-01

## 2024-11-01 RX ORDER — EPINEPHRINE 0.3 MG/.3ML
0.3 INJECTION SUBCUTANEOUS ONCE AS NEEDED
Status: DISCONTINUED | OUTPATIENT
Start: 2024-11-01 | End: 2024-11-01 | Stop reason: HOSPADM

## 2024-11-01 RX ORDER — SODIUM CHLORIDE 0.9 % (FLUSH) 0.9 %
10 SYRINGE (ML) INJECTION
Status: DISCONTINUED | OUTPATIENT
Start: 2024-11-01 | End: 2024-11-01 | Stop reason: HOSPADM

## 2024-11-01 RX ORDER — MEPERIDINE HYDROCHLORIDE 50 MG/ML
25 INJECTION INTRAMUSCULAR; INTRAVENOUS; SUBCUTANEOUS ONCE AS NEEDED
Status: DISCONTINUED | OUTPATIENT
Start: 2024-11-01 | End: 2024-11-01 | Stop reason: HOSPADM

## 2024-11-01 RX ORDER — PROCHLORPERAZINE EDISYLATE 5 MG/ML
10 INJECTION INTRAMUSCULAR; INTRAVENOUS ONCE AS NEEDED
Status: CANCELLED | OUTPATIENT
Start: 2024-11-01

## 2024-11-01 RX ADMIN — FAMOTIDINE 20 MG: 10 INJECTION INTRAVENOUS at 12:11

## 2024-11-01 RX ADMIN — HEPARIN 500 UNITS: 100 SYRINGE at 02:11

## 2024-11-01 RX ADMIN — DEXAMETHASONE SODIUM PHOSPHATE 20 MG: 4 INJECTION, SOLUTION INTRA-ARTICULAR; INTRALESIONAL; INTRAMUSCULAR; INTRAVENOUS; SOFT TISSUE at 12:11

## 2024-11-01 RX ADMIN — DIPHENHYDRAMINE HYDROCHLORIDE 50 MG: 50 INJECTION, SOLUTION INTRAMUSCULAR; INTRAVENOUS at 12:11

## 2024-11-01 RX ADMIN — SODIUM CHLORIDE: 9 INJECTION, SOLUTION INTRAVENOUS at 12:11

## 2024-11-01 RX ADMIN — SODIUM CHLORIDE 600 MG: 9 INJECTION, SOLUTION INTRAVENOUS at 01:11

## 2024-11-01 RX ADMIN — Medication 10 ML: at 02:11

## 2024-11-04 ENCOUNTER — TELEPHONE (OUTPATIENT)
Dept: HEMATOLOGY/ONCOLOGY | Facility: CLINIC | Age: 39
End: 2024-11-04
Payer: COMMERCIAL

## 2024-11-04 DIAGNOSIS — T45.1X5A CHEMOTHERAPY-INDUCED NEUROPATHY: ICD-10-CM

## 2024-11-04 DIAGNOSIS — T45.1X5A HOT FLASHES DUE TO TAMOXIFEN: ICD-10-CM

## 2024-11-04 DIAGNOSIS — R23.2 HOT FLASHES DUE TO TAMOXIFEN: ICD-10-CM

## 2024-11-04 DIAGNOSIS — G62.0 CHEMOTHERAPY-INDUCED NEUROPATHY: ICD-10-CM

## 2024-11-04 DIAGNOSIS — M54.59 OTHER LOW BACK PAIN: Primary | ICD-10-CM

## 2024-11-04 NOTE — NURSING
Discussed referral to Dr. Boles for side effects from breast cancer treatment. Patient notes residual neuropathy, arthralgias and hot flashes as well low back pain. Works remotely in a sedentary job. Preparing for second stage of reconstruction 12/2/24. Virtual follow-up planned with Dr. Boles for 12/20/24 at 1pm with wait-list opportunity added. Acupuncture orders placed per protocol for symptoms above. Discussed opportunities for yoga and follow-up with dietitian. Patient feels limited with her job at this moment. She prefers to think about yoga referral for now. SJ Mitchell.

## 2024-11-05 ENCOUNTER — PATIENT MESSAGE (OUTPATIENT)
Dept: ADMINISTRATIVE | Facility: OTHER | Age: 39
End: 2024-11-05
Payer: COMMERCIAL

## 2024-11-18 ENCOUNTER — PATIENT MESSAGE (OUTPATIENT)
Dept: HEMATOLOGY/ONCOLOGY | Facility: CLINIC | Age: 39
End: 2024-11-18
Payer: COMMERCIAL

## 2024-11-22 ENCOUNTER — INFUSION (OUTPATIENT)
Dept: INFUSION THERAPY | Facility: HOSPITAL | Age: 39
End: 2024-11-22
Payer: COMMERCIAL

## 2024-11-22 VITALS
BODY MASS INDEX: 41.85 KG/M2 | HEIGHT: 64 IN | RESPIRATION RATE: 18 BRPM | TEMPERATURE: 98 F | SYSTOLIC BLOOD PRESSURE: 133 MMHG | WEIGHT: 245.13 LBS | HEART RATE: 96 BPM | DIASTOLIC BLOOD PRESSURE: 74 MMHG

## 2024-11-22 DIAGNOSIS — C50.411 MALIGNANT NEOPLASM OF UPPER-OUTER QUADRANT OF RIGHT BREAST IN FEMALE, ESTROGEN RECEPTOR POSITIVE: Primary | ICD-10-CM

## 2024-11-22 DIAGNOSIS — Z17.0 MALIGNANT NEOPLASM OF UPPER-OUTER QUADRANT OF RIGHT BREAST IN FEMALE, ESTROGEN RECEPTOR POSITIVE: Primary | ICD-10-CM

## 2024-11-22 PROCEDURE — 96375 TX/PRO/DX INJ NEW DRUG ADDON: CPT

## 2024-11-22 PROCEDURE — 96367 TX/PROPH/DG ADDL SEQ IV INF: CPT

## 2024-11-22 PROCEDURE — 63600175 PHARM REV CODE 636 W HCPCS: Performed by: STUDENT IN AN ORGANIZED HEALTH CARE EDUCATION/TRAINING PROGRAM

## 2024-11-22 PROCEDURE — 96413 CHEMO IV INFUSION 1 HR: CPT

## 2024-11-22 PROCEDURE — 25000003 PHARM REV CODE 250: Performed by: STUDENT IN AN ORGANIZED HEALTH CARE EDUCATION/TRAINING PROGRAM

## 2024-11-22 RX ORDER — PROCHLORPERAZINE EDISYLATE 5 MG/ML
10 INJECTION INTRAMUSCULAR; INTRAVENOUS ONCE AS NEEDED
Status: DISCONTINUED | OUTPATIENT
Start: 2024-11-22 | End: 2024-11-22 | Stop reason: HOSPADM

## 2024-11-22 RX ORDER — DIPHENHYDRAMINE HYDROCHLORIDE 50 MG/ML
50 INJECTION INTRAMUSCULAR; INTRAVENOUS ONCE AS NEEDED
Status: DISCONTINUED | OUTPATIENT
Start: 2024-11-22 | End: 2024-11-22 | Stop reason: HOSPADM

## 2024-11-22 RX ORDER — FAMOTIDINE 10 MG/ML
20 INJECTION INTRAVENOUS
Status: COMPLETED | OUTPATIENT
Start: 2024-11-22 | End: 2024-11-22

## 2024-11-22 RX ORDER — DEXAMETHASONE SODIUM PHOSPHATE 4 MG/ML
20 INJECTION, SOLUTION INTRA-ARTICULAR; INTRALESIONAL; INTRAMUSCULAR; INTRAVENOUS; SOFT TISSUE
Status: DISCONTINUED | OUTPATIENT
Start: 2024-11-22 | End: 2024-11-22

## 2024-11-22 RX ORDER — EPINEPHRINE 0.3 MG/.3ML
0.3 INJECTION SUBCUTANEOUS ONCE AS NEEDED
Status: CANCELLED | OUTPATIENT
Start: 2024-11-22

## 2024-11-22 RX ORDER — DEXAMETHASONE SODIUM PHOSPHATE 4 MG/ML
20 INJECTION, SOLUTION INTRA-ARTICULAR; INTRALESIONAL; INTRAMUSCULAR; INTRAVENOUS; SOFT TISSUE
Status: CANCELLED
Start: 2024-11-22 | End: 2024-11-22

## 2024-11-22 RX ORDER — FAMOTIDINE 10 MG/ML
20 INJECTION INTRAVENOUS
Status: CANCELLED
Start: 2024-11-22 | End: 2024-11-22

## 2024-11-22 RX ORDER — HEPARIN 100 UNIT/ML
500 SYRINGE INTRAVENOUS
Status: CANCELLED | OUTPATIENT
Start: 2024-11-22

## 2024-11-22 RX ORDER — DIPHENHYDRAMINE HYDROCHLORIDE 50 MG/ML
50 INJECTION INTRAMUSCULAR; INTRAVENOUS ONCE AS NEEDED
Status: CANCELLED | OUTPATIENT
Start: 2024-11-22

## 2024-11-22 RX ORDER — EPINEPHRINE 0.3 MG/.3ML
0.3 INJECTION SUBCUTANEOUS ONCE AS NEEDED
Status: DISCONTINUED | OUTPATIENT
Start: 2024-11-22 | End: 2024-11-22 | Stop reason: HOSPADM

## 2024-11-22 RX ORDER — SODIUM CHLORIDE 0.9 % (FLUSH) 0.9 %
10 SYRINGE (ML) INJECTION
Status: CANCELLED | OUTPATIENT
Start: 2024-11-22

## 2024-11-22 RX ORDER — MEPERIDINE HYDROCHLORIDE 50 MG/ML
25 INJECTION INTRAMUSCULAR; INTRAVENOUS; SUBCUTANEOUS ONCE AS NEEDED
Status: CANCELLED | OUTPATIENT
Start: 2024-11-22

## 2024-11-22 RX ORDER — MEPERIDINE HYDROCHLORIDE 50 MG/ML
25 INJECTION INTRAMUSCULAR; INTRAVENOUS; SUBCUTANEOUS ONCE AS NEEDED
Status: DISCONTINUED | OUTPATIENT
Start: 2024-11-22 | End: 2024-11-22 | Stop reason: HOSPADM

## 2024-11-22 RX ORDER — PROCHLORPERAZINE EDISYLATE 5 MG/ML
10 INJECTION INTRAMUSCULAR; INTRAVENOUS ONCE AS NEEDED
Status: CANCELLED | OUTPATIENT
Start: 2024-11-22

## 2024-11-22 RX ORDER — HEPARIN 100 UNIT/ML
500 SYRINGE INTRAVENOUS
Status: DISCONTINUED | OUTPATIENT
Start: 2024-11-22 | End: 2024-11-22 | Stop reason: HOSPADM

## 2024-11-22 RX ORDER — SODIUM CHLORIDE 0.9 % (FLUSH) 0.9 %
10 SYRINGE (ML) INJECTION
Status: DISCONTINUED | OUTPATIENT
Start: 2024-11-22 | End: 2024-11-22 | Stop reason: HOSPADM

## 2024-11-22 RX ADMIN — DEXAMETHASONE SODIUM PHOSPHATE 20 MG: 4 INJECTION, SOLUTION INTRA-ARTICULAR; INTRALESIONAL; INTRAMUSCULAR; INTRAVENOUS; SOFT TISSUE at 12:11

## 2024-11-22 RX ADMIN — DIPHENHYDRAMINE HYDROCHLORIDE 50 MG: 50 INJECTION, SOLUTION INTRAMUSCULAR; INTRAVENOUS at 11:11

## 2024-11-22 RX ADMIN — TRASTUZUMAB-ANNS 600 MG: 420 INJECTION, POWDER, LYOPHILIZED, FOR SOLUTION INTRAVENOUS at 12:11

## 2024-11-22 RX ADMIN — FAMOTIDINE 20 MG: 10 INJECTION INTRAVENOUS at 11:11

## 2024-11-22 RX ADMIN — HEPARIN 500 UNITS: 100 SYRINGE at 01:11

## 2024-12-02 ENCOUNTER — PATIENT MESSAGE (OUTPATIENT)
Dept: HEMATOLOGY/ONCOLOGY | Facility: CLINIC | Age: 39
End: 2024-12-02
Payer: COMMERCIAL

## 2024-12-12 ENCOUNTER — INFUSION (OUTPATIENT)
Dept: INFUSION THERAPY | Facility: HOSPITAL | Age: 39
End: 2024-12-12
Payer: COMMERCIAL

## 2024-12-12 ENCOUNTER — OFFICE VISIT (OUTPATIENT)
Dept: HEMATOLOGY/ONCOLOGY | Facility: CLINIC | Age: 39
End: 2024-12-12
Payer: COMMERCIAL

## 2024-12-12 ENCOUNTER — PATIENT MESSAGE (OUTPATIENT)
Dept: HEMATOLOGY/ONCOLOGY | Facility: CLINIC | Age: 39
End: 2024-12-12
Payer: COMMERCIAL

## 2024-12-12 ENCOUNTER — LAB VISIT (OUTPATIENT)
Dept: LAB | Facility: HOSPITAL | Age: 39
End: 2024-12-12
Attending: STUDENT IN AN ORGANIZED HEALTH CARE EDUCATION/TRAINING PROGRAM
Payer: COMMERCIAL

## 2024-12-12 VITALS
HEIGHT: 64 IN | SYSTOLIC BLOOD PRESSURE: 140 MMHG | OXYGEN SATURATION: 96 % | BODY MASS INDEX: 42.34 KG/M2 | DIASTOLIC BLOOD PRESSURE: 76 MMHG | RESPIRATION RATE: 18 BRPM | WEIGHT: 248 LBS | HEART RATE: 112 BPM | TEMPERATURE: 99 F

## 2024-12-12 VITALS
HEART RATE: 106 BPM | SYSTOLIC BLOOD PRESSURE: 125 MMHG | OXYGEN SATURATION: 96 % | RESPIRATION RATE: 18 BRPM | DIASTOLIC BLOOD PRESSURE: 71 MMHG | TEMPERATURE: 98 F

## 2024-12-12 DIAGNOSIS — D64.9 ANEMIA, UNSPECIFIED TYPE: ICD-10-CM

## 2024-12-12 DIAGNOSIS — Z17.0 MALIGNANT NEOPLASM OF UPPER-OUTER QUADRANT OF RIGHT BREAST IN FEMALE, ESTROGEN RECEPTOR POSITIVE: Primary | ICD-10-CM

## 2024-12-12 DIAGNOSIS — C50.411 MALIGNANT NEOPLASM OF UPPER-OUTER QUADRANT OF RIGHT BREAST IN FEMALE, ESTROGEN RECEPTOR POSITIVE: ICD-10-CM

## 2024-12-12 DIAGNOSIS — E88.09 HYPOALBUMINEMIA: ICD-10-CM

## 2024-12-12 DIAGNOSIS — Z17.0 MALIGNANT NEOPLASM OF UPPER-OUTER QUADRANT OF RIGHT BREAST IN FEMALE, ESTROGEN RECEPTOR POSITIVE: ICD-10-CM

## 2024-12-12 DIAGNOSIS — C50.411 MALIGNANT NEOPLASM OF UPPER-OUTER QUADRANT OF RIGHT BREAST IN FEMALE, ESTROGEN RECEPTOR POSITIVE: Primary | ICD-10-CM

## 2024-12-12 LAB
ALBUMIN SERPL BCP-MCNC: 2.9 G/DL (ref 3.5–5.2)
ALP SERPL-CCNC: 74 U/L (ref 40–150)
ALT SERPL W/O P-5'-P-CCNC: 20 U/L (ref 10–44)
ANION GAP SERPL CALC-SCNC: 6 MMOL/L (ref 8–16)
AST SERPL-CCNC: 18 U/L (ref 10–40)
BASOPHILS # BLD AUTO: 0.02 K/UL (ref 0–0.2)
BASOPHILS NFR BLD: 0.3 % (ref 0–1.9)
BILIRUB SERPL-MCNC: 0.3 MG/DL (ref 0.1–1)
BUN SERPL-MCNC: 13 MG/DL (ref 6–20)
CALCIUM SERPL-MCNC: 8.5 MG/DL (ref 8.7–10.5)
CHLORIDE SERPL-SCNC: 109 MMOL/L (ref 95–110)
CO2 SERPL-SCNC: 23 MMOL/L (ref 23–29)
CREAT SERPL-MCNC: 0.8 MG/DL (ref 0.5–1.4)
DIFFERENTIAL METHOD BLD: ABNORMAL
EOSINOPHIL # BLD AUTO: 0.3 K/UL (ref 0–0.5)
EOSINOPHIL NFR BLD: 4.7 % (ref 0–8)
ERYTHROCYTE [DISTWIDTH] IN BLOOD BY AUTOMATED COUNT: 14.7 % (ref 11.5–14.5)
EST. GFR  (NO RACE VARIABLE): >60 ML/MIN/1.73 M^2
GLUCOSE SERPL-MCNC: 93 MG/DL (ref 70–110)
HCT VFR BLD AUTO: 33.3 % (ref 37–48.5)
HGB BLD-MCNC: 10.5 G/DL (ref 12–16)
IMM GRANULOCYTES # BLD AUTO: 0.01 K/UL (ref 0–0.04)
IMM GRANULOCYTES NFR BLD AUTO: 0.2 % (ref 0–0.5)
LYMPHOCYTES # BLD AUTO: 1.8 K/UL (ref 1–4.8)
LYMPHOCYTES NFR BLD: 30.6 % (ref 18–48)
MCH RBC QN AUTO: 27.4 PG (ref 27–31)
MCHC RBC AUTO-ENTMCNC: 31.5 G/DL (ref 32–36)
MCV RBC AUTO: 87 FL (ref 82–98)
MONOCYTES # BLD AUTO: 0.8 K/UL (ref 0.3–1)
MONOCYTES NFR BLD: 13.5 % (ref 4–15)
NEUTROPHILS # BLD AUTO: 3.1 K/UL (ref 1.8–7.7)
NEUTROPHILS NFR BLD: 50.7 % (ref 38–73)
NRBC BLD-RTO: 0 /100 WBC
PLATELET # BLD AUTO: 212 K/UL (ref 150–450)
PMV BLD AUTO: 9.2 FL (ref 9.2–12.9)
POTASSIUM SERPL-SCNC: 4.3 MMOL/L (ref 3.5–5.1)
PROT SERPL-MCNC: 6.5 G/DL (ref 6–8.4)
RBC # BLD AUTO: 3.83 M/UL (ref 4–5.4)
SODIUM SERPL-SCNC: 138 MMOL/L (ref 136–145)
WBC # BLD AUTO: 6.01 K/UL (ref 3.9–12.7)

## 2024-12-12 PROCEDURE — 3078F DIAST BP <80 MM HG: CPT | Mod: CPTII,S$GLB,, | Performed by: NURSE PRACTITIONER

## 2024-12-12 PROCEDURE — 36415 COLL VENOUS BLD VENIPUNCTURE: CPT | Performed by: STUDENT IN AN ORGANIZED HEALTH CARE EDUCATION/TRAINING PROGRAM

## 2024-12-12 PROCEDURE — 99999 PR PBB SHADOW E&M-EST. PATIENT-LVL V: CPT | Mod: PBBFAC,,, | Performed by: NURSE PRACTITIONER

## 2024-12-12 PROCEDURE — G2211 COMPLEX E/M VISIT ADD ON: HCPCS | Mod: S$GLB,,, | Performed by: NURSE PRACTITIONER

## 2024-12-12 PROCEDURE — 80053 COMPREHEN METABOLIC PANEL: CPT | Performed by: STUDENT IN AN ORGANIZED HEALTH CARE EDUCATION/TRAINING PROGRAM

## 2024-12-12 PROCEDURE — 85025 COMPLETE CBC W/AUTO DIFF WBC: CPT | Performed by: STUDENT IN AN ORGANIZED HEALTH CARE EDUCATION/TRAINING PROGRAM

## 2024-12-12 PROCEDURE — 25000003 PHARM REV CODE 250: Performed by: NURSE PRACTITIONER

## 2024-12-12 PROCEDURE — 99215 OFFICE O/P EST HI 40 MIN: CPT | Mod: S$GLB,,, | Performed by: NURSE PRACTITIONER

## 2024-12-12 PROCEDURE — 63600175 PHARM REV CODE 636 W HCPCS: Performed by: NURSE PRACTITIONER

## 2024-12-12 PROCEDURE — 1159F MED LIST DOCD IN RCRD: CPT | Mod: CPTII,S$GLB,, | Performed by: NURSE PRACTITIONER

## 2024-12-12 PROCEDURE — 3008F BODY MASS INDEX DOCD: CPT | Mod: CPTII,S$GLB,, | Performed by: NURSE PRACTITIONER

## 2024-12-12 PROCEDURE — 96375 TX/PRO/DX INJ NEW DRUG ADDON: CPT

## 2024-12-12 PROCEDURE — 3077F SYST BP >= 140 MM HG: CPT | Mod: CPTII,S$GLB,, | Performed by: NURSE PRACTITIONER

## 2024-12-12 RX ORDER — DEXAMETHASONE SODIUM PHOSPHATE 4 MG/ML
20 INJECTION, SOLUTION INTRA-ARTICULAR; INTRALESIONAL; INTRAMUSCULAR; INTRAVENOUS; SOFT TISSUE
Status: COMPLETED | OUTPATIENT
Start: 2024-12-12 | End: 2024-12-12

## 2024-12-12 RX ORDER — DIPHENHYDRAMINE HYDROCHLORIDE 50 MG/ML
50 INJECTION INTRAMUSCULAR; INTRAVENOUS ONCE AS NEEDED
Status: CANCELLED | OUTPATIENT
Start: 2024-12-13

## 2024-12-12 RX ORDER — PROCHLORPERAZINE EDISYLATE 5 MG/ML
10 INJECTION INTRAMUSCULAR; INTRAVENOUS ONCE AS NEEDED
Status: CANCELLED | OUTPATIENT
Start: 2024-12-13

## 2024-12-12 RX ORDER — HEPARIN 100 UNIT/ML
500 SYRINGE INTRAVENOUS
Status: DISCONTINUED | OUTPATIENT
Start: 2024-12-12 | End: 2024-12-12 | Stop reason: HOSPADM

## 2024-12-12 RX ORDER — HEPARIN 100 UNIT/ML
500 SYRINGE INTRAVENOUS
Status: CANCELLED | OUTPATIENT
Start: 2024-12-13

## 2024-12-12 RX ORDER — FAMOTIDINE 10 MG/ML
20 INJECTION INTRAVENOUS
Status: CANCELLED
Start: 2024-12-13 | End: 2024-12-13

## 2024-12-12 RX ORDER — PROCHLORPERAZINE EDISYLATE 5 MG/ML
10 INJECTION INTRAMUSCULAR; INTRAVENOUS ONCE AS NEEDED
Status: DISCONTINUED | OUTPATIENT
Start: 2024-12-12 | End: 2024-12-12 | Stop reason: HOSPADM

## 2024-12-12 RX ORDER — DIPHENHYDRAMINE HYDROCHLORIDE 50 MG/ML
50 INJECTION INTRAMUSCULAR; INTRAVENOUS ONCE AS NEEDED
Status: DISCONTINUED | OUTPATIENT
Start: 2024-12-12 | End: 2024-12-12 | Stop reason: HOSPADM

## 2024-12-12 RX ORDER — SODIUM CHLORIDE 0.9 % (FLUSH) 0.9 %
10 SYRINGE (ML) INJECTION
Status: DISCONTINUED | OUTPATIENT
Start: 2024-12-12 | End: 2024-12-12 | Stop reason: HOSPADM

## 2024-12-12 RX ORDER — EPINEPHRINE 0.3 MG/.3ML
0.3 INJECTION SUBCUTANEOUS ONCE AS NEEDED
Status: DISCONTINUED | OUTPATIENT
Start: 2024-12-12 | End: 2024-12-12 | Stop reason: HOSPADM

## 2024-12-12 RX ORDER — SODIUM CHLORIDE 0.9 % (FLUSH) 0.9 %
10 SYRINGE (ML) INJECTION
Status: CANCELLED | OUTPATIENT
Start: 2024-12-13

## 2024-12-12 RX ORDER — MEPERIDINE HYDROCHLORIDE 50 MG/ML
25 INJECTION INTRAMUSCULAR; INTRAVENOUS; SUBCUTANEOUS ONCE AS NEEDED
Status: DISCONTINUED | OUTPATIENT
Start: 2024-12-12 | End: 2024-12-12 | Stop reason: HOSPADM

## 2024-12-12 RX ORDER — DEXAMETHASONE SODIUM PHOSPHATE 4 MG/ML
20 INJECTION, SOLUTION INTRA-ARTICULAR; INTRALESIONAL; INTRAMUSCULAR; INTRAVENOUS; SOFT TISSUE
Status: CANCELLED
Start: 2024-12-13 | End: 2024-12-13

## 2024-12-12 RX ORDER — EPINEPHRINE 0.3 MG/.3ML
0.3 INJECTION SUBCUTANEOUS ONCE AS NEEDED
Status: CANCELLED | OUTPATIENT
Start: 2024-12-13

## 2024-12-12 RX ORDER — FAMOTIDINE 10 MG/ML
20 INJECTION INTRAVENOUS
Status: COMPLETED | OUTPATIENT
Start: 2024-12-12 | End: 2024-12-12

## 2024-12-12 RX ORDER — MEPERIDINE HYDROCHLORIDE 50 MG/ML
25 INJECTION INTRAMUSCULAR; INTRAVENOUS; SUBCUTANEOUS ONCE AS NEEDED
Status: CANCELLED | OUTPATIENT
Start: 2024-12-13

## 2024-12-12 RX ADMIN — DIPHENHYDRAMINE HYDROCHLORIDE 50 MG: 50 INJECTION, SOLUTION INTRAMUSCULAR; INTRAVENOUS at 12:12

## 2024-12-12 RX ADMIN — SODIUM CHLORIDE: 9 INJECTION, SOLUTION INTRAVENOUS at 12:12

## 2024-12-12 RX ADMIN — FAMOTIDINE 20 MG: 10 INJECTION INTRAVENOUS at 12:12

## 2024-12-12 RX ADMIN — TRASTUZUMAB-ANNS 600 MG: 420 INJECTION, POWDER, LYOPHILIZED, FOR SOLUTION INTRAVENOUS at 01:12

## 2024-12-12 RX ADMIN — DEXAMETHASONE SODIUM PHOSPHATE 20 MG: 4 INJECTION INTRA-ARTICULAR; INTRALESIONAL; INTRAMUSCULAR; INTRAVENOUS; SOFT TISSUE at 12:12

## 2024-12-12 NOTE — PROGRESS NOTES
[facility]/ [facility]      Chief Complaint:   HR+/Her2+ breast cancer      Cancer Staging   Malignant neoplasm of upper-outer quadrant of right breast in female, estrogen receptor positive  Staging form: Breast, AJCC 8th Edition  - Clinical stage from 2024: Stage IB (cT2, cN0, cM0, G3, ER+, CT+, HER2+) - Signed by Kassie Blakely MD on 2024    HPI:  aMdhu Dela Cruz is a 40yo woman who presents today for evaluation of newly diagnosed breast cancer. Oncologic history below:     -2023 Screening mammogram with no evidence of malignancy   -2024 Diagnostic mammogram (palpable right breast mass) with  2.2 x 1.8 x 2.1 cm right breast mass   -2024 Needle biopsy of right breast with IDC grade 3, 8mm in greatest dimension, %, CT 40%, HER2 3+, Ki-67 >90%.  -24 Breast MRI with right breast 2.4 cm x 2.1 cm x 2.3 cm mass at the 3 o'clock position. A smaller benign appearing right breast mass. Normal axillary lymph nodes.   -started neoadjuvant TCHP 2024, completed 6 cycles 24  -MRI breast showing resolution of known breast mass   -on 24 she underwent bilateral mastectomies w flap reconstruction; final pathology of Rt breast: no residual carcinoma, 0/4 LN. ypT0N0 (see outside path scanned in)  -continued adjuvant trastuzumab; started adjuvant tamoxifen 24      Gyn History:   Menarche: Age 10   Menopause: Pre    Age at first pregnancy: 15  : 2nd child, not first  HRT: Was on hormonal birth control, stopped   Does not desire fertility preservation.   Family History: Maternal aunt, diagnosed later in life. No ovarian cancer.     She lives in [facility] with her , they run a photo lubin business.        Interval history:   Madhu returns today for follow up. She has been doing fairly well since her last visit. She was started on jardiance by cardiology which has improved her  "shortness of breath and LE swelling. Taking lasix prn only sparingly. Has noticed some joint pain since starting tamoxifen; predominantly in her legs and lower back. Taking toradol as needed which has helped. Had her last reconstruction and did well. She has 1 remaining drain         Patient Active Problem List   Diagnosis    Malignant neoplasm of upper-outer quadrant of right breast in female, estrogen receptor positive    Chemotherapy induced diarrhea    Dehydration       Current Outpatient Medications   Medication Instructions    duke's soln (benadryl 30 mL, mylanta 30 mL, LIDOcaine 30 mL, nystatin 30 mL) 120mL 10 mLs, Oral, 4 times daily, Swish and swallow    ergocalciferol, vitamin D2, (VITAMIN D ORAL) No dose, route, or frequency recorded.    EScitalopram oxalate (LEXAPRO) 5 mg, Oral, Daily    gabapentin (NEURONTIN) 300 mg, Oral, 2 times daily    LACTOBACILLUS ACIDOPHILUS ORAL No dose, route, or frequency recorded.    LIDOcaine-prilocaine (EMLA) cream Topical (Top), As needed (PRN)    metoprolol tartrate (LOPRESSOR) 25 mg, Oral, 2 times daily    ondansetron (ZOFRAN) 4 MG tablet No dose, route, or frequency recorded.    oxyCODONE (ROXICODONE) 5 mg, Oral, Every 4 hours PRN    phenazopyridine (PYRIDIUM) 100 mg, Oral, 3 times daily PRN    prochlorperazine (COMPAZINE) 10 mg, Oral, Every 6 hours PRN    tamoxifen (NOLVADEX) 20 mg, Oral, Daily       Review of Systems:   Review of Systems   Constitutional:         See above   All other systems reviewed and are negative.       PHYSICAL EXAM:  BP (!) 140/76 (BP Location: Left arm, Patient Position: Sitting)   Pulse (!) 112   Temp 99.3 °F (37.4 °C) (Oral)   Resp 18   Ht 5' 4" (1.626 m)   Wt 112.5 kg (248 lb 0.3 oz)   LMP  (LMP Unknown)   SpO2 96%   BMI 42.57 kg/m²     Physical Exam  Vitals reviewed.   Constitutional:       General: She is not in acute distress.     Appearance: Normal appearance.      Comments: Presents alone  Very pleasant.    HENT:      Nose: " Nose normal.      Mouth/Throat:      Mouth: Mucous membranes are moist.   Eyes:      General: No scleral icterus.     Conjunctiva/sclera: Conjunctivae normal.      Pupils: Pupils are equal, round, and reactive to light.   Cardiovascular:      Rate and Rhythm: Normal rate and regular rhythm.   Pulmonary:      Effort: Pulmonary effort is normal.      Breath sounds: Normal breath sounds. No wheezing.      Comments: Breast exam deferred as surgery examining. dressing  Abdominal:      General: Abdomen is flat. Bowel sounds are normal.      Palpations: Abdomen is soft.   Musculoskeletal:      Cervical back: Normal range of motion and neck supple.      Comments: ROM slightly limited due to recent breast surgery   Skin:     General: Skin is warm and dry.   Neurological:      Mental Status: She is alert and oriented to person, place, and time.      Motor: No weakness.   Psychiatric:         Mood and Affect: Mood normal.           Pertinent Labs & Imaging:  Reviewed recent labs, imaging and pathology.     Assessment & Plan:  Ms. Dela Cruz is a cuco 40yo woman with recently diagnosed cT2N0 HR+/Her2+ breast cancer who presents today for follow up.     Given that her tumor is as least T2N0, proceeded with neoadjuvant treatment with TCHP. She completed 6 cycles and is most recent s/p bilateral mastectomies on 7/8/24 demonstrating pCR.    In the setting of pCR, have recommend continuing with trastuzumab only q3 weeks for the remainder of one year. Have since added tamoxifen to address HR+ component of her breast cancer which she is tolerating well thus far. She was in agreement with the above plan.       #HR+/Her2+ breast cancer:  --proceed with trastuzumab q3 weeks as scheduled   -- BRCA testing negative (no pathologic mutations, had to VUS in met and pten genes)  --s/p referral to integrative onc  --echo 9/20/2024 w EF 60-65%, repeat due 12/2024 - this is scheduled.   --cont tamoxifen (SOT 8/2024), goal treatment for 5  years  --RTC in 6 weeks     #Joint pain: likely exacerbated by tamoxifen  --cont toradol as needed,   --referral placed to acupuncture previously       #Chemo induced neuropathy- improved  --started on gabapentin 300 mg by surgery team  --continue as has improved neuropathy       #Normocytic anemia: likely 2/2 chemo,   --parameters with dip post recent surgery  --will cont to monitor       #Leg swelling: improving  --cont 20 mg lasix prn   --now on jardiance which has helped  --low albumin may be playing a role     #hypoalbuminemia  --refer to nutrition      #Anxiety   --cont lexapro 5 mg         All questions were answered to her apparent satisfaction. Will see her back in 6 weeks or sooner should the need arise.          Route Chart for Scheduling    Med Onc Chart Routing      Follow up with physician 6 weeks.   Follow up with [facility] 3 months.   Infusion scheduling note    Injection scheduling note    Labs CBC and CMP   Scheduling:  Preferred lab:  Lab interval:     Imaging    Pharmacy appointment    Other referrals                  Treatment Plan Information   OP BREAST TCHP (pertuzumab trastuzumab DOCEtaxel CARBOplatin) Q3W Kassie Blakely MD   Associated diagnosis: Malignant neoplasm of upper-outer quadrant of right breast in female, estrogen receptor positive Stage IB cT2, cN0, cM0, G3, ER+, IA+, HER2+ noted on 1/17/2024   Line of treatment: First Line  Treatment Goal: Curative     Upcoming Treatment Dates - OP BREAST TCHP (pertuzumab trastuzumab DOCEtaxel CARBOplatin) Q3W    1/3/2025       Chemotherapy       trastuzumab-anns (KANJINTI) 621 mg in 0.9% NaCl 250 mL chemo infusion       Supportive Care       prochlorperazine injection 10 mg       meperidine injection 25 mg       Antiemetics       dexAMETHasone injection 20 mg       famotidine (PF) injection 20 mg       diphenhydrAMINE (BENADRYL) 50 mg in 0.9% NaCl 50 mL IVPB  1/24/2025       Chemotherapy       trastuzumab-anns (KANJINTI) 621 mg in 0.9% NaCl 250  mL chemo infusion       Supportive Care       prochlorperazine injection 10 mg       meperidine injection 25 mg       Antiemetics       dexAMETHasone injection 20 mg       famotidine (PF) injection 20 mg       diphenhydrAMINE (BENADRYL) 50 mg in 0.9% NaCl 50 mL IVPB  2/14/2025       Chemotherapy       trastuzumab-anns (KANJINTI) 621 mg in 0.9% NaCl 250 mL chemo infusion       Supportive Care       prochlorperazine injection 10 mg       meperidine injection 25 mg       Antiemetics       dexAMETHasone injection 20 mg       famotidine (PF) injection 20 mg       diphenhydrAMINE (BENADRYL) 50 mg in 0.9% NaCl 50 mL IVPB  3/7/2025       Chemotherapy       trastuzumab-anns (KANJINTI) 621 mg in 0.9% NaCl 250 mL chemo infusion       Supportive Care       prochlorperazine injection 10 mg       meperidine injection 25 mg       Antiemetics       dexAMETHasone injection 20 mg       famotidine (PF) injection 20 mg       diphenhydrAMINE (BENADRYL) 50 mg in 0.9% NaCl 50 mL IVPB    Supportive Plan Information  IV FLUIDS AND ELECTROLYTES Kassie Blakely MD   Associated Diagnosis: Dehydration   noted on 2/22/2024  Associated Diagnosis: Chemotherapy induced diarrhea   noted on 2/22/2024  Associated Diagnosis: Malignant neoplasm of upper-outer quadrant of right breast in female, estrogen receptor positive Stage IB cT2, cN0, cM0, G3, ER+, VT+, HER2+ noted on 1/17/2024   Line of treatment: Supportive Care   Treatment goal: Supportive     Upcoming Treatment Dates - IV FLUIDS AND ELECTROLYTES    No upcoming days in selected categories.      MDM includes  :    - Acute or chronic illness or injury that poses a threat to life or bodily function  - Review of prior external notes from unique source  - Independent review and explanation of 3+ results from unique tests  - Discussion of management and ordering 3+ unique tests  - Extensive discussion of treatment and management  - Prescription drug management  - Drug therapy requiring intensive  monitoring for toxicity            Patient is in agreement with the proposed treatment plan. All questions were answered to the patient's satisfaction. Pt knows to call clinic for any new or worsening symptoms and if anything is needed before the next clinic visit.    Lisa Lopez, MSN, APRN, FNP-C  Nurse Practitioner to Dr. Estephania Martinez  Lead [facility]  [facility]  Hematology & Medical Oncology  [address] [place] [id number]  ph. [phone] ext [phone]  Fax. [phone]

## 2024-12-12 NOTE — PLAN OF CARE
Problem: Chemotherapy Effects  Goal: Anemia Symptom Improvement  Outcome: Progressing  Goal: Safety Maintained  Outcome: Progressing  Goal: Absence of Hematuria  Outcome: Progressing  Goal: Nausea and Vomiting Relief  Outcome: Progressing  Goal: Neurotoxicity Symptom Control  Outcome: Progressing  Goal: Absence of Infection  Outcome: Progressing  Goal: Absence of Bleeding  Outcome: Progressing     Ambulatory to clinic with no c/o adverse effects or s/s of infection.  PAC accessed, flushed with out difficulty, blood return noted and infused with no problems.  Premeds and Kanjinti tolerated with no problems.  Ambulatory home.  NAD.

## 2024-12-13 ENCOUNTER — TELEPHONE (OUTPATIENT)
Dept: HEMATOLOGY/ONCOLOGY | Facility: CLINIC | Age: 39
End: 2024-12-13
Payer: COMMERCIAL

## 2024-12-16 ENCOUNTER — CLINICAL SUPPORT (OUTPATIENT)
Dept: HEMATOLOGY/ONCOLOGY | Facility: CLINIC | Age: 39
End: 2024-12-16
Payer: COMMERCIAL

## 2024-12-16 ENCOUNTER — PATIENT MESSAGE (OUTPATIENT)
Dept: HEMATOLOGY/ONCOLOGY | Facility: CLINIC | Age: 39
End: 2024-12-16

## 2024-12-16 VITALS — HEIGHT: 64 IN | WEIGHT: 248 LBS | BODY MASS INDEX: 42.34 KG/M2

## 2024-12-16 DIAGNOSIS — E88.09 HYPOALBUMINEMIA: ICD-10-CM

## 2024-12-16 DIAGNOSIS — Z17.0 MALIGNANT NEOPLASM OF UPPER-OUTER QUADRANT OF RIGHT BREAST IN FEMALE, ESTROGEN RECEPTOR POSITIVE: ICD-10-CM

## 2024-12-16 DIAGNOSIS — D64.9 ANEMIA, UNSPECIFIED TYPE: ICD-10-CM

## 2024-12-16 DIAGNOSIS — C50.411 MALIGNANT NEOPLASM OF UPPER-OUTER QUADRANT OF RIGHT BREAST IN FEMALE, ESTROGEN RECEPTOR POSITIVE: ICD-10-CM

## 2024-12-16 DIAGNOSIS — Z71.3 NUTRITIONAL COUNSELING: Primary | ICD-10-CM

## 2024-12-16 PROCEDURE — 97803 MED NUTRITION INDIV SUBSEQ: CPT | Mod: 95,,, | Performed by: DIETITIAN, REGISTERED

## 2024-12-16 NOTE — PROGRESS NOTES
The patient location is: [place]  The chief complaint leading to consultation is: ***    Visit type: {TELE AUDIOVISUAL:24191}    Face to Face time with patient: ***  *** minutes of total time spent on the encounter, which includes face to face time and non-face to face time preparing to see the patient (eg, review of tests), Obtaining and/or reviewing separately obtained history, Documenting clinical information in the electronic or other health record, Independently interpreting results (not separately reported) and communicating results to the patient/family/caregiver, or Care coordination (not separately reported).     Each patient to whom he or she provides medical services by telemedicine is:  (1) informed of the relationship between the physician and patient and the respective role of any other health care provider with respect to management of the patient; and (2) notified that he or she may decline to receive medical services by telemedicine and may withdraw from such care at any time.    Oncology Nutrition Assessment for Medical Nutrition Therapy  Follow up Visit    Madhu Saucedo Jose De Jesus   1985    Referring Provider:  Lisa Lopez NP      Reason for Visit: Nutrition counseling and education     PMHx:   Past Medical History:   Diagnosis Date    Esophageal reflux     Migraine        Nutrition Assessment    Patient is a 39 y.o.female with  neoadjuvant treatment with TCHP. She completed 6 cycles and is most recent s/p bilateral mastectomies on 7/8/24 demonstrating pCR.  In the setting of pCR, have recommend continuing with trastuzumab only q3 weeks for the remainder of one year. Have since added tamoxifen to address HR+ component of her breast cancer which she is tolerating well thus far.. Currently being treated with OP BREAST TCHP (pertuzumab trastuzumab DOCEtaxel CARBOplatin) Q3W     She requested a follow up with nutrition based on ongoing low albumin. She reports she has added protein,  including protein drinks/yogurt drinks. She had really bad diarrhea during treatment so didn't   Breakfast- scrambled eggs, breakfast meat, biscuit or bread with juice and coffee   Lunch- salad and leftover Dean's   Dinner- meat, carbohydrate, vegetables   Snacks- protein drinks, popcorn, nuts, trail mix   Drinks- water, sometimes soda    Weight:   Height:   BMI:There is no height or weight on file to calculate BMI.   IBW: Patient weight not recorded    Usual BW: ***  Weight Change:***    Allergies: Pertuzumab and Trastuzumab    Current Medications:    Current Outpatient Medications:     duke's soln (benadryl 30 mL, mylanta 30 mL, LIDOcaine 30 mL, nystatin 30 mL) 120mL, Take 10 mLs by mouth 4 (four) times daily. Swish and swallow, Disp: 120 mL, Rfl: 3    ergocalciferol, vitamin D2, (VITAMIN D ORAL), , Disp: , Rfl:     EScitalopram oxalate (LEXAPRO) 5 MG Tab, Take 1 tablet (5 mg total) by mouth once daily., Disp: 90 tablet, Rfl: 3    gabapentin (NEURONTIN) 300 MG capsule, Take 1 capsule (300 mg total) by mouth 2 (two) times daily., Disp: 60 capsule, Rfl: 11    LACTOBACILLUS ACIDOPHILUS ORAL, , Disp: , Rfl:     LIDOcaine-prilocaine (EMLA) cream, Apply topically as needed (apply 30-60 minutes prior to chemotherapy)., Disp: 25 g, Rfl: 1    metoprolol tartrate (LOPRESSOR) 25 MG tablet, Take 1 tablet (25 mg total) by mouth 2 (two) times daily., Disp: 60 tablet, Rfl: 11    ondansetron (ZOFRAN) 4 MG tablet, , Disp: , Rfl:     oxyCODONE (ROXICODONE) 5 MG immediate release tablet, Take 1 tablet (5 mg total) by mouth every 4 (four) hours as needed for Pain., Disp: 30 tablet, Rfl: 0    phenazopyridine (PYRIDIUM) 100 MG tablet, Take 1 tablet (100 mg total) by mouth 3 (three) times daily as needed for Pain., Disp: 30 tablet, Rfl: 1    prochlorperazine (COMPAZINE) 5 MG tablet, Take 2 tablets (10 mg total) by mouth every 6 (six) hours as needed for Nausea., Disp: 20 tablet, Rfl: 5    tamoxifen (NOLVADEX) 20 MG Tab, Take 1 tablet (20  mg total) by mouth once daily., Disp: 30 tablet, Rfl: 11    Current Facility-Administered Medications:     copper intrauterine device 380 square mm 380 mm IUD, 380 mm, Intrauterine, , Dorina Cullen MD, 380 mm at 01/25/24 0945    Vitamins/Supplements: Vitamin D     Labs: Reviewed     Glucose   Date Value Ref Range Status   12/12/2024 93 70 - 110 mg/dL Final   11/01/2024 125 (H) 70 - 110 mg/dL Final     BUN   Date Value Ref Range Status   12/12/2024 13 6 - 20 mg/dL Final   11/01/2024 16 6 - 20 mg/dL Final     Creatinine   Date Value Ref Range Status   12/12/2024 0.8 0.5 - 1.4 mg/dL Final   11/01/2024 0.7 0.5 - 1.4 mg/dL Final     Sodium   Date Value Ref Range Status   12/12/2024 138 136 - 145 mmol/L Final   11/01/2024 138 136 - 145 mmol/L Final     Potassium   Date Value Ref Range Status   12/12/2024 4.3 3.5 - 5.1 mmol/L Final   11/01/2024 4.0 3.5 - 5.1 mmol/L Final     Calcium   Date Value Ref Range Status   12/12/2024 8.5 (L) 8.7 - 10.5 mg/dL Final   11/01/2024 8.6 (L) 8.7 - 10.5 mg/dL Final     Total Protein   Date Value Ref Range Status   12/12/2024 6.5 6.0 - 8.4 g/dL Final   11/01/2024 6.6 6.0 - 8.4 g/dL Final     Hemoglobin   Date Value Ref Range Status   12/12/2024 10.5 (L) 12.0 - 16.0 g/dL Final   11/01/2024 11.7 (L) 12.0 - 16.0 g/dL Final     POC Hematocrit   Date Value Ref Range Status   04/03/2024 37 36 - 54 %PCV Final     Hematocrit   Date Value Ref Range Status   12/12/2024 33.3 (L) 37.0 - 48.5 % Final   11/01/2024 36.5 (L) 37.0 - 48.5 % Final     Nutrition Diagnosis    Problem: {ON Nutrtioin diagnosis statements:12356}  Etiology (related to): {LKNUTETIOLOGY:03258}  Signs/Symptoms (as evidenced by): {LKNUTSIGNSNSYM:75281}    Nutrition Intervention    Nutrition Prescription   *** Kcals (***kcal/kg)  *** g protein (***g/kg)   *** mL fluid (***mL/kg)    Recommendations:***  Fish oil   Multivitamin   Anti-inflammatory diet   -yogurt and berries at breakfast   -olive oil, healthy fats       Materials  Provided/Reviewed ***    Nutrition Monitoring and Evaluation    Monitor: {LKNUTMONITOR:99064}      Goals: ***    Follow up {LKFOLLOWUP:06376}    Communication to referring provider/care team: ***note available in chart     Counseling time: {Time; intervals (quarter hr to 2h):21998}    Idalmis Carson, MPH, RD, , LDN, FAND  Board Certified Specialist in Oncology Nutrition   [phone]

## 2024-12-16 NOTE — PROGRESS NOTES
The patient location is: [place]  The chief complaint leading to consultation is: hypoalbuminemia     Visit type: audiovisual    Face to Face time with patient: 11 minutes   20 minutes of total time spent on the encounter, which includes face to face time and non-face to face time preparing to see the patient (eg, review of tests), Obtaining and/or reviewing separately obtained history, Documenting clinical information in the electronic or other health record, Independently interpreting results (not separately reported) and communicating results to the patient/family/caregiver, or Care coordination (not separately reported).     Each patient to whom he or she provides medical services by telemedicine is:  (1) informed of the relationship between the physician and patient and the respective role of any other health care provider with respect to management of the patient; and (2) notified that he or she may decline to receive medical services by telemedicine and may withdraw from such care at any time.    Oncology Nutrition Assessment for Medical Nutrition Therapy  Follow up Visit    Madhu Saucedo Jose De Jesus   1985    Referring Provider:  Lisa Lopez NP      Reason for Visit: Nutrition counseling and education     PMHx:   Past Medical History:   Diagnosis Date    Esophageal reflux     Migraine        Nutrition Assessment    Patient is a 39 y.o.female with recently diagnosed breast cancer. She completed neoadjuvant treatment with TCHP x 6 cycles and is most recent s/p bilateral mastectomies on 7/8/24 demonstrating pCR. She is continuing with trastuzumab only q3 weeks for the remainder of one year. Also on tamoxifen. Last seen by nutrition about 8 months ago.   She requested a follow up based on ongoing hypoalbuminemia. She reports she has added protein, including protein drinks/yogurt drinks. She had really bad diarrhea during treatment so didn't feel like she was absorbing her food/nutrition well for  "several months.   Breakfast- scrambled eggs, breakfast meat, biscuit or bread with juice and coffee   Lunch- salad and leftover Dean's   Dinner- meat, carbohydrate, vegetables   Snacks- protein drinks, popcorn, nuts, trail mix   Drinks- water, sometimes soda    Weight:112.5 kg (248 lb)  Height:5' 4" (1.626 m)  BMI:Body mass index is 42.57 kg/m².   IBW: Ideal body weight: 54.7 kg (120 lb 9.5 oz)  Adjusted ideal body weight: 77.8 kg (171 lb 8.9 oz)    Allergies: Pertuzumab and Trastuzumab    Current Medications:    Current Outpatient Medications:     duke's soln (benadryl 30 mL, mylanta 30 mL, LIDOcaine 30 mL, nystatin 30 mL) 120mL, Take 10 mLs by mouth 4 (four) times daily. Swish and swallow, Disp: 120 mL, Rfl: 3    ergocalciferol, vitamin D2, (VITAMIN D ORAL), , Disp: , Rfl:     EScitalopram oxalate (LEXAPRO) 5 MG Tab, Take 1 tablet (5 mg total) by mouth once daily., Disp: 90 tablet, Rfl: 3    gabapentin (NEURONTIN) 300 MG capsule, Take 1 capsule (300 mg total) by mouth 2 (two) times daily., Disp: 60 capsule, Rfl: 11    LACTOBACILLUS ACIDOPHILUS ORAL, , Disp: , Rfl:     LIDOcaine-prilocaine (EMLA) cream, Apply topically as needed (apply 30-60 minutes prior to chemotherapy)., Disp: 25 g, Rfl: 1    metoprolol tartrate (LOPRESSOR) 25 MG tablet, Take 1 tablet (25 mg total) by mouth 2 (two) times daily., Disp: 60 tablet, Rfl: 11    ondansetron (ZOFRAN) 4 MG tablet, , Disp: , Rfl:     oxyCODONE (ROXICODONE) 5 MG immediate release tablet, Take 1 tablet (5 mg total) by mouth every 4 (four) hours as needed for Pain., Disp: 30 tablet, Rfl: 0    phenazopyridine (PYRIDIUM) 100 MG tablet, Take 1 tablet (100 mg total) by mouth 3 (three) times daily as needed for Pain., Disp: 30 tablet, Rfl: 1    prochlorperazine (COMPAZINE) 5 MG tablet, Take 2 tablets (10 mg total) by mouth every 6 (six) hours as needed for Nausea., Disp: 20 tablet, Rfl: 5    tamoxifen (NOLVADEX) 20 MG Tab, Take 1 tablet (20 mg total) by mouth once daily., Disp: " 30 tablet, Rfl: 11    Current Facility-Administered Medications:     copper intrauterine device 380 square mm 380 mm IUD, 380 mm, Intrauterine, , Dorina Cullen MD, 380 mm at 01/25/24 0945    Vitamins/Supplements: Vitamin D     Labs: Reviewed     Glucose   Date Value Ref Range Status   12/12/2024 93 70 - 110 mg/dL Final   11/01/2024 125 (H) 70 - 110 mg/dL Final     BUN   Date Value Ref Range Status   12/12/2024 13 6 - 20 mg/dL Final   11/01/2024 16 6 - 20 mg/dL Final     Creatinine   Date Value Ref Range Status   12/12/2024 0.8 0.5 - 1.4 mg/dL Final   11/01/2024 0.7 0.5 - 1.4 mg/dL Final     Sodium   Date Value Ref Range Status   12/12/2024 138 136 - 145 mmol/L Final   11/01/2024 138 136 - 145 mmol/L Final     Potassium   Date Value Ref Range Status   12/12/2024 4.3 3.5 - 5.1 mmol/L Final   11/01/2024 4.0 3.5 - 5.1 mmol/L Final     Calcium   Date Value Ref Range Status   12/12/2024 8.5 (L) 8.7 - 10.5 mg/dL Final   11/01/2024 8.6 (L) 8.7 - 10.5 mg/dL Final     Total Protein   Date Value Ref Range Status   12/12/2024 6.5 6.0 - 8.4 g/dL Final   11/01/2024 6.6 6.0 - 8.4 g/dL Final     Hemoglobin   Date Value Ref Range Status   12/12/2024 10.5 (L) 12.0 - 16.0 g/dL Final   11/01/2024 11.7 (L) 12.0 - 16.0 g/dL Final     POC Hematocrit   Date Value Ref Range Status   04/03/2024 37 36 - 54 %PCV Final     Hematocrit   Date Value Ref Range Status   12/12/2024 33.3 (L) 37.0 - 48.5 % Final   11/01/2024 36.5 (L) 37.0 - 48.5 % Final     Nutrition Diagnosis    Problem: Altered nutrition-related laboratory values  Etiology (related to): diarrhea and inflammation  Signs/Symptoms (as evidenced by):  Albumin 2.9    Nutrition Intervention    Nutrition Prescription   1700 Kcals (15kcal/kg)  90 g protein (0.8g/kg)   2800 mL fluid (25mL/kg)    Recommendations:  Anti-inflammatory diet   -lean proteins like fish, chicken, eggs  -avoid processed meats and processed foods   (Try yogurt and berries rather than breakfast meat)  -include fruit  and/or vegetable at each meal   -healthy fats like nuts, seeds, olive oils, avocado/avocado oil     Supplements  -resume fish oil  -add multivitamin (brands reviewed)     Materials Provided/Reviewed   Anti-inflammatory diet recipes and site     Nutrition Monitoring and Evaluation    Monitor: energy intake, diet tolerance , and labs (Albumin)       Goals: Albumin WNL, improve diet quality     Follow up Patient will follow up via portal as needed with any questions/concerns     Communication to referring provider/care team: note available in chart     Counseling time: 15 Minutes    Idalmis Carson, MPH, RD, , LDN, FAND  Board Certified Specialist in Oncology Nutrition   [phone]

## 2024-12-18 ENCOUNTER — TELEPHONE (OUTPATIENT)
Dept: HEMATOLOGY/ONCOLOGY | Facility: CLINIC | Age: 39
End: 2024-12-18
Payer: COMMERCIAL

## 2024-12-18 ENCOUNTER — HOSPITAL ENCOUNTER (OUTPATIENT)
Dept: CARDIOLOGY | Facility: HOSPITAL | Age: 39
Discharge: HOME | End: 2024-12-18
Attending: STUDENT IN AN ORGANIZED HEALTH CARE EDUCATION/TRAINING PROGRAM
Payer: COMMERCIAL

## 2024-12-18 VITALS
HEART RATE: 96 BPM | BODY MASS INDEX: 42.34 KG/M2 | WEIGHT: 248 LBS | HEIGHT: 64 IN | SYSTOLIC BLOOD PRESSURE: 121 MMHG | DIASTOLIC BLOOD PRESSURE: 84 MMHG

## 2024-12-18 DIAGNOSIS — Z17.0 MALIGNANT NEOPLASM OF UPPER-OUTER QUADRANT OF RIGHT BREAST IN FEMALE, ESTROGEN RECEPTOR POSITIVE: ICD-10-CM

## 2024-12-18 DIAGNOSIS — C50.411 MALIGNANT NEOPLASM OF UPPER-OUTER QUADRANT OF RIGHT BREAST IN FEMALE, ESTROGEN RECEPTOR POSITIVE: ICD-10-CM

## 2024-12-18 LAB
ASCENDING AORTA: 2.9 CM
AV AREA BY CONTINUOUS VTI: 2.6 CM2
AV INDEX (PROSTH): 0.76
AV LVOT MEAN GRADIENT: 2 MMHG
AV LVOT PEAK GRADIENT: 4 MMHG
AV MEAN GRADIENT: 3.8 MMHG
AV PEAK GRADIENT: 6.8 MMHG
AV VALVE AREA BY VELOCITY RATIO: 2.9 CM²
AV VALVE AREA: 2.6 CM2
AV VELOCITY RATIO: 0.85
BSA FOR ECHO PROCEDURE: 2.25 M2
CV ECHO LV RWT: 0.3 CM
DOP CALC AO PEAK VEL: 1.3 M/S
DOP CALC AO VTI: 22.5 CM
DOP CALC LVOT AREA: 3.5 CM2
DOP CALC LVOT DIAMETER: 2.1 CM
DOP CALC LVOT PEAK VEL: 1.1 M/S
DOP CALC LVOT STROKE VOLUME: 58.9 CM3
DOP CALCLVOT PEAK VEL VTI: 17 CM
E WAVE DECELERATION TIME: 250.73 MS
E/A RATIO: 0.86
E/E' RATIO: 5 M/S
ECHO EF ESTIMATED: 60 %
ECHO LV POSTERIOR WALL: 0.7 CM (ref 0.6–1.1)
FRACTIONAL SHORTENING: 32.6 % (ref 28–44)
INTERVENTRICULAR SEPTUM: 0.7 CM (ref 0.6–1.1)
LA MAJOR: 5.5 CM
LA MINOR: 5.36 CM
LA WIDTH: 2.69 CM
LEFT ATRIUM SIZE: 4.06 CM
LEFT ATRIUM VOLUME INDEX MOD: 17.3 ML/M2
LEFT ATRIUM VOLUME INDEX: 23.6 ML/M2
LEFT ATRIUM VOLUME MOD: 37 ML
LEFT ATRIUM VOLUME: 50.4 CM3
LEFT INTERNAL DIMENSION IN SYSTOLE: 3.1 CM (ref 2.1–4)
LEFT VENTRICLE DIASTOLIC VOLUME INDEX: 44.8 ML/M2
LEFT VENTRICLE DIASTOLIC VOLUME: 95.88 ML
LEFT VENTRICLE MASS INDEX: 46.4 G/M2
LEFT VENTRICLE SYSTOLIC VOLUME INDEX: 17.9 ML/M2
LEFT VENTRICLE SYSTOLIC VOLUME: 38.23 ML
LEFT VENTRICULAR INTERNAL DIMENSION IN DIASTOLE: 4.6 CM (ref 3.5–6)
LEFT VENTRICULAR MASS: 99.3 G
LV LATERAL E/E' RATIO: 4.62
LV SEPTAL E/E' RATIO: 5.45
MV PEAK A VEL: 0.7 M/S
MV PEAK E VEL: 0.6 M/S
OHS CV RV/LV RATIO: 0.85 CM
RA MAJOR: 5.68 CM
RA PRESSURE ESTIMATED: 0 MMHG
RA WIDTH: 3.57 CM
RIGHT ATRIAL AREA: 16.6 CM2
RIGHT VENTRICLE DIASTOLIC BASEL DIMENSION: 3.9 CM
RV TISSUE DOPPLER FREE WALL SYSTOLIC VELOCITY 1 (APICAL 4 CHAMBER VIEW): 10.56 CM/S
SINUS: 3.27 CM
STJ: 2.92 CM
TDI LATERAL: 0.13 M/S
TDI SEPTAL: 0.11 M/S
TDI: 0.12 M/S
TRICUSPID ANNULAR PLANE SYSTOLIC EXCURSION: 1.98 CM
Z-SCORE OF LEFT VENTRICULAR DIMENSION IN END DIASTOLE: -4.03
Z-SCORE OF LEFT VENTRICULAR DIMENSION IN END SYSTOLE: -2.39

## 2024-12-18 PROCEDURE — 93306 TTE W/DOPPLER COMPLETE: CPT

## 2024-12-18 PROCEDURE — 93306 TTE W/DOPPLER COMPLETE: CPT | Mod: 26,,, | Performed by: INTERNAL MEDICINE

## 2024-12-18 NOTE — NURSING
Confirms 11:30am virtual Friday 12/20/24 with Dr. Boles, currently napping, will touchbase tomorrow to check on patient, SJ Mitchell.

## 2024-12-20 ENCOUNTER — TELEPHONE (OUTPATIENT)
Dept: HEMATOLOGY/ONCOLOGY | Facility: CLINIC | Age: 39
End: 2024-12-20
Payer: COMMERCIAL

## 2024-12-20 ENCOUNTER — OFFICE VISIT (OUTPATIENT)
Dept: HEMATOLOGY/ONCOLOGY | Facility: CLINIC | Age: 39
End: 2024-12-20
Payer: COMMERCIAL

## 2024-12-20 DIAGNOSIS — Z17.0 MALIGNANT NEOPLASM OF UPPER-OUTER QUADRANT OF RIGHT BREAST IN FEMALE, ESTROGEN RECEPTOR POSITIVE: Primary | ICD-10-CM

## 2024-12-20 DIAGNOSIS — C50.411 MALIGNANT NEOPLASM OF UPPER-OUTER QUADRANT OF RIGHT BREAST IN FEMALE, ESTROGEN RECEPTOR POSITIVE: Primary | ICD-10-CM

## 2024-12-20 DIAGNOSIS — M25.50 ARTHRALGIA, UNSPECIFIED JOINT: ICD-10-CM

## 2024-12-20 NOTE — PROGRESS NOTES
The patient location is: Home    The chief complaint leading to consultation is: Treatment Side Effects     Visit type: audiovisual     Face to Face time with patient: 25   minutes of total time spent on the encounter, which includes face to face time and non-face to face time preparing to see the patient (eg, review of tests), Obtaining and/or reviewing separately obtained history, Documenting clinical information in the electronic or other health record, Independently interpreting results (not separately reported) and communicating results to the patient/family/caregiver, or Care coordination (not separately reported).            Each patient to whom he or she provides medical services by telemedicine is:  (1) informed of the relationship between the physician and patient and the respective role of any other health care provider with respect to management of the patient; and (2) notified that he or she may decline to receive medical services by telemedicine and may withdraw from such care at any time.       Integrative [facility] and Medicine Initial Visit      Patient is a 40 yo  premenopausal female who presents virtually to Integrative [facility] for evaluation and recommendations pertaining to her history of breast cancer. She is referred by Dr. Kassie Blakely of Hematology Oncology for treatment related side effects. She complains of neuropathy, arthralgias, hot flashes and anxiety.     Mrs. Dela Cruz has a history of ER+ KS+ HER2(+) Right IDC diagnosed 24. She was treated with neoadjuvant TCHP, completing 6 cycles 2024-24 prior to bilateral mastectomy with flap reconstruction on 24. Herceptin maintenance continues in the adjuvant setting with Tamoxifen intitiated 24. 5 years recommended on Tamoxifen therapy. History of OCP's prior to diagnosis. Paragard IUD placed 24.   She reports soreness  all over since starting Tamoxifen  WWE: 3/23/23  Genetics: 24  Echo: 2024  Labs:  12/12/24  PCP: Davis Hanson MD 11/12/24  Cardiology: Shahid Jones MD 10/28/24      Cancer/Stage/TNM:   Cancer Staging   Malignant neoplasm of upper-outer quadrant of right breast in female, estrogen receptor positive  Staging form: Breast, AJCC 8th Edition  - Clinical stage from 1/18/2024: Stage IB (cT2, cN0, cM0, G3, ER+, LA+, HER2+) - Signed by Kassie Blakely MD on 1/18/2024       Oncology History   Malignant neoplasm of upper-outer quadrant of right breast in female, estrogen receptor positive   1/17/2024 Initial Diagnosis    Malignant neoplasm of upper-outer quadrant of right breast in female, estrogen receptor positive     1/18/2024 Cancer Staged    Staging form: Breast, AJCC 8th Edition  - Clinical stage from 1/18/2024: Stage IB (cT2, cN0, cM0, G3, ER+, LA+, HER2+)     2/15/2024 -  Chemotherapy    Treatment Summary   Plan Name: OP BREAST TCHP (pertuzumab trastuzumab DOCEtaxel CARBOplatin) Q3W  Treatment Goal: Curative  Status: Active  Start Date: 2/15/2024  End Date: 3/7/2025 (Planned)  Provider: Kassie Blakely MD  Chemotherapy: CARBOplatin (PARAPLATIN) 900 mg in sodium chloride 0.9% 285 mL chemo infusion, 900 mg, Intravenous, Clinic/[facility] 1 time, 6 of 6 cycles  Administration: 900 mg (2/15/2024), 900 mg (3/6/2024), 900 mg (3/28/2024), 900 mg (4/19/2024), 900 mg (5/10/2024), 900 mg (5/31/2024)  DOCEtaxel (TAXOTERE) 75 mg/m2 = 154 mg in sodium chloride 0.9% 300.4 mL chemo infusion, 75 mg/m2 = 154 mg, Intravenous, Clinic/[facility] 1 time, 6 of 6 cycles  Dose modification: 60 mg/m2 (original dose 75 mg/m2, Cycle 4)  Administration: 154 mg (2/15/2024), 154 mg (3/6/2024), 154 mg (3/28/2024), 120 mg (4/19/2024), 120 mg (5/10/2024), 120 mg (5/31/2024)  pertuzumab (PERJETA) 840 mg in sodium chloride 0.9% 313 mL infusion, 840 mg, Intravenous, [facility] 1 time, 6 of 6 cycles  Administration: 840 mg (2/15/2024), 420 mg (3/6/2024), 420 mg (3/28/2024), 420 mg (4/19/2024), 420 mg (5/10/2024), 420 mg  (5/31/2024)  trastuzumab-anns (KANJINTI) 747 mg in sodium chloride 0.9% 320.57 mL chemo infusion, 8 mg/kg = 747 mg, Intravenous, Clinic[facility] 1 time, 13 of 17 cycles  Dose modification: 8 mg/kg (original dose 6 mg/kg, Cycle 7)  Administration: 747 mg (2/15/2024), 560 mg (3/6/2024), 560 mg (3/28/2024), 560 mg (4/19/2024), 600 mg (8/30/2024), 560 mg (5/10/2024), 560 mg (5/31/2024), 828 mg (8/9/2024), 600 mg (9/20/2024), 600 mg (10/11/2024), 600 mg (11/1/2024), 600 mg (11/22/2024)           Past Medical History:   Diagnosis Date    Esophageal reflux     Migraine         Current Outpatient Medications   Medication Instructions    duke's soln (benadryl 30 mL, mylanta 30 mL, LIDOcaine 30 mL, nystatin 30 mL) 120mL 10 mLs, Oral, 4 times daily, Swish and swallow    ergocalciferol, vitamin D2, (VITAMIN D ORAL) No dose, route, or frequency recorded.    EScitalopram oxalate (LEXAPRO) 5 mg, Oral, Daily    gabapentin (NEURONTIN) 300 mg, Oral, 2 times daily    LACTOBACILLUS ACIDOPHILUS ORAL No dose, route, or frequency recorded.    LIDOcaine-prilocaine (EMLA) cream Topical (Top), As needed (PRN)    metoprolol tartrate (LOPRESSOR) 25 mg, Oral, 2 times daily    ondansetron (ZOFRAN) 4 MG tablet No dose, route, or frequency recorded.    oxyCODONE (ROXICODONE) 5 mg, Oral, Every 4 hours PRN    phenazopyridine (PYRIDIUM) 100 mg, Oral, 3 times daily PRN    prochlorperazine (COMPAZINE) 10 mg, Oral, Every 6 hours PRN    tamoxifen (NOLVADEX) 20 mg, Oral, Daily        No past surgical history on file.              Sleep  How many hours of sleep per night? 6-8 hours  Do you have trouble falling asleep, staying asleep or waking up earlier than you need to? yes  Do you have daytime fatigue? yes  Do you need medication for sleep? Xanax prn, muscle relaxer- since surgery  Do you use any supplements or other interventions for sleep? no    Resilience  Rate your current level of stress-6/10  How do you manage stress?  prayer , takes Lexapro- increased  to 10mg daily- sees Psychiatry NP- feels numb/flat since dose increased      Nutrition   Food allergies or sensitivities: no  Do you adhere to a particular type of diet? no  What type of diet do you follow?  none  Do you have any concerns with your eating habits? yes- wants to improve inflammation  Are you concerned with your level of alcohol intake? no    Exercise  How would you describe your physical activity level? minimal  Do you work at a sedentary job? yes  What do you do for physical activity? Walking, stationary bike      Physical Exam   LMP  (LMP Unknown)    Wt Readings from Last 3 Encounters:   12/18/24 112.5 kg (248 lb 0.3 oz)   12/16/24 112.5 kg (248 lb)   12/12/24 112.5 kg (248 lb 0.3 oz)     Temp Readings from Last 3 Encounters:   12/12/24 98.1 °F (36.7 °C)   12/12/24 99.3 °F (37.4 °C) (Oral)   11/22/24 98.2 °F (36.8 °C) (Oral)     BP Readings from Last 3 Encounters:   12/18/24 121/84   12/12/24 125/71   12/12/24 (!) 140/76     Pulse Readings from Last 3 Encounters:   12/18/24 96   12/12/24 106   12/12/24 (!) 112     Body mass index is 42.57  Vitals reviewed.     Constitutional:       General: Patient is not in acute distress.     Appearance: Normal appearance.   Psychiatric:         Mood and Affect: Mood normal.         Behavior: Behavior normal.         Thought Content: Thought content normal.         Judgment: Judgment normal.      Review of Systems:   Cardiac:           No SOB, chest pain with exertion,edema, orthopnea  Distress:          No excessive sadness, no hopelessness, no anhedonia, no excessive worry or nervousness  Cognitive:        No trouble with memory, no difficulty paying attention, no brain fog, no trouble functioning with work or home life  Fatigue:           Energy level adequate, performing ADL's, no morning fatigue                           Fatigue  1  / 10  ( Scale 0 - 10)   Hormonal:       No hot flashes, no night sweats  Pain:                Has pain,  location:joints , low  back                    Neuropathy:    No numbness, no tingling, no paresthesia   Sleep:              No difficulty falling asleep, +waking up in night, no daytime sleepiness, no snoring  Altered function:          No problems with money management,  no problems with daily organization & planning  Weight:           No concerns with weight, wants to lose a little and maintain healthy        Labs:   Lab Results   Component Value Date    WBC 6.01 12/12/2024    HGB 10.5 (L) 12/12/2024    HCT 33.3 (L) 12/12/2024    MCV 87 12/12/2024     12/12/2024 12/12/24 A1c is 6.7  Vitamin D level 16.4 on 10/30/24   Albumin 2.9 on 12/12/24 with elevated Triglycerides 171        Assessment:   Breast Cancer  SERM use  Joint pain  Plan   Nutrition: Continue to encourage plant based diet; given risk factors of Cardiac Disease discussed anti-inflammatory diet as well as foods to decrease effects of diabetes and prediabetes.   Sleep: Recommend 6-8 hours of restful sleep nightly; recommend strong sleep hygiene routine one hour prior to bed. Discussed relaxation, Magnesium Glycinate 400mg nightly and guided imagery prior to bed.   Mind Body Medicine: Continue Deep breathing exercise, yoga, and focus on gratitude   Exercise: Recommend 60 minutes of gentle movement/light activity per day (cleaning, walking, cooking, gardening, stationary bike). Recommend some type of cardiovascular activity daily if well.   Recommend a positive thought process through affirmations and visualizations.  Refer to Acupuncture For Chronic Low Back Pain, Arthralgias, Hot Flashes, and Anxiety  PT for Decreased Functional Status, Fatigue, Neuropathy, and Arthralgias    Dietitian for High Risk Factors Such As Cardiac Disease and Menopausal Symptoms .appointment was on 12/16    Recommended Vitamins/Supplements:  Vitamin D and Magnesium Glycinate 400mg  Follow up prn    Total time 35 minutes- face to face, review of medical record and arranging follow up

## 2024-12-20 NOTE — NURSING
.RN Daniel conducted chart review for clinic preparations including intake, barriers to care and opportunities for greater evaluation/recommendations by provider, Stephen Frazier RN.

## 2024-12-25 ENCOUNTER — PATIENT MESSAGE (OUTPATIENT)
Dept: HEMATOLOGY/ONCOLOGY | Facility: CLINIC | Age: 39
End: 2024-12-25
Payer: COMMERCIAL

## 2025-01-02 ENCOUNTER — PATIENT MESSAGE (OUTPATIENT)
Dept: HEMATOLOGY/ONCOLOGY | Facility: CLINIC | Age: 40
End: 2025-01-02
Payer: COMMERCIAL

## 2025-01-02 RX ORDER — SODIUM CHLORIDE 0.9 % (FLUSH) 0.9 %
10 SYRINGE (ML) INJECTION
Status: CANCELLED | OUTPATIENT
Start: 2025-01-03

## 2025-01-02 RX ORDER — FAMOTIDINE 10 MG/ML
20 INJECTION INTRAVENOUS
Status: CANCELLED
Start: 2025-01-03 | End: 2025-01-03

## 2025-01-02 RX ORDER — PROCHLORPERAZINE EDISYLATE 5 MG/ML
10 INJECTION INTRAMUSCULAR; INTRAVENOUS ONCE AS NEEDED
Status: CANCELLED | OUTPATIENT
Start: 2025-01-03

## 2025-01-02 RX ORDER — DEXAMETHASONE SODIUM PHOSPHATE 4 MG/ML
20 INJECTION, SOLUTION INTRA-ARTICULAR; INTRALESIONAL; INTRAMUSCULAR; INTRAVENOUS; SOFT TISSUE
Status: CANCELLED
Start: 2025-01-03 | End: 2025-01-03

## 2025-01-02 RX ORDER — DIPHENHYDRAMINE HYDROCHLORIDE 50 MG/ML
50 INJECTION INTRAMUSCULAR; INTRAVENOUS ONCE AS NEEDED
Status: CANCELLED | OUTPATIENT
Start: 2025-01-03

## 2025-01-02 RX ORDER — EPINEPHRINE 0.3 MG/.3ML
0.3 INJECTION SUBCUTANEOUS ONCE AS NEEDED
Status: CANCELLED | OUTPATIENT
Start: 2025-01-03

## 2025-01-02 RX ORDER — MEPERIDINE HYDROCHLORIDE 50 MG/ML
25 INJECTION INTRAMUSCULAR; INTRAVENOUS; SUBCUTANEOUS ONCE AS NEEDED
Status: CANCELLED | OUTPATIENT
Start: 2025-01-03

## 2025-01-02 RX ORDER — HEPARIN 100 UNIT/ML
500 SYRINGE INTRAVENOUS
Status: CANCELLED | OUTPATIENT
Start: 2025-01-03

## 2025-01-03 ENCOUNTER — INFUSION (OUTPATIENT)
Dept: INFUSION THERAPY | Facility: HOSPITAL | Age: 40
End: 2025-01-03
Payer: COMMERCIAL

## 2025-01-03 ENCOUNTER — LAB VISIT (OUTPATIENT)
Dept: LAB | Facility: HOSPITAL | Age: 40
End: 2025-01-03
Payer: COMMERCIAL

## 2025-01-03 VITALS
HEIGHT: 64 IN | WEIGHT: 233.25 LBS | TEMPERATURE: 98 F | SYSTOLIC BLOOD PRESSURE: 124 MMHG | OXYGEN SATURATION: 97 % | DIASTOLIC BLOOD PRESSURE: 80 MMHG | HEART RATE: 89 BPM | RESPIRATION RATE: 17 BRPM | BODY MASS INDEX: 39.82 KG/M2

## 2025-01-03 DIAGNOSIS — Z17.0 MALIGNANT NEOPLASM OF UPPER-OUTER QUADRANT OF RIGHT BREAST IN FEMALE, ESTROGEN RECEPTOR POSITIVE: ICD-10-CM

## 2025-01-03 DIAGNOSIS — D64.9 ANEMIA, UNSPECIFIED TYPE: ICD-10-CM

## 2025-01-03 DIAGNOSIS — C50.411 MALIGNANT NEOPLASM OF UPPER-OUTER QUADRANT OF RIGHT BREAST IN FEMALE, ESTROGEN RECEPTOR POSITIVE: ICD-10-CM

## 2025-01-03 DIAGNOSIS — Z17.0 MALIGNANT NEOPLASM OF UPPER-OUTER QUADRANT OF RIGHT BREAST IN FEMALE, ESTROGEN RECEPTOR POSITIVE: Primary | ICD-10-CM

## 2025-01-03 DIAGNOSIS — C50.411 MALIGNANT NEOPLASM OF UPPER-OUTER QUADRANT OF RIGHT BREAST IN FEMALE, ESTROGEN RECEPTOR POSITIVE: Primary | ICD-10-CM

## 2025-01-03 LAB
ALBUMIN SERPL BCP-MCNC: 2.7 G/DL (ref 3.5–5.2)
ALP SERPL-CCNC: 70 U/L (ref 40–150)
ALT SERPL W/O P-5'-P-CCNC: 19 U/L (ref 10–44)
ANION GAP SERPL CALC-SCNC: 6 MMOL/L (ref 8–16)
AST SERPL-CCNC: 24 U/L (ref 10–40)
BILIRUB SERPL-MCNC: 0.2 MG/DL (ref 0.1–1)
BUN SERPL-MCNC: 8 MG/DL (ref 6–20)
CALCIUM SERPL-MCNC: 8.6 MG/DL (ref 8.7–10.5)
CHLORIDE SERPL-SCNC: 107 MMOL/L (ref 95–110)
CO2 SERPL-SCNC: 24 MMOL/L (ref 23–29)
CREAT SERPL-MCNC: 0.8 MG/DL (ref 0.5–1.4)
ERYTHROCYTE [DISTWIDTH] IN BLOOD BY AUTOMATED COUNT: 14 % (ref 11.5–14.5)
EST. GFR  (NO RACE VARIABLE): >60 ML/MIN/1.73 M^2
GLUCOSE SERPL-MCNC: 97 MG/DL (ref 70–110)
HCT VFR BLD AUTO: 33.4 % (ref 37–48.5)
HGB BLD-MCNC: 10.6 G/DL (ref 12–16)
IMM GRANULOCYTES # BLD AUTO: 0.01 K/UL (ref 0–0.04)
MCH RBC QN AUTO: 26.6 PG (ref 27–31)
MCHC RBC AUTO-ENTMCNC: 31.7 G/DL (ref 32–36)
MCV RBC AUTO: 84 FL (ref 82–98)
NEUTROPHILS # BLD AUTO: 2.3 K/UL (ref 1.8–7.7)
PLATELET # BLD AUTO: 329 K/UL (ref 150–450)
PMV BLD AUTO: 8.9 FL (ref 9.2–12.9)
POTASSIUM SERPL-SCNC: 3.8 MMOL/L (ref 3.5–5.1)
PROT SERPL-MCNC: 6.9 G/DL (ref 6–8.4)
RBC # BLD AUTO: 3.99 M/UL (ref 4–5.4)
SODIUM SERPL-SCNC: 137 MMOL/L (ref 136–145)
WBC # BLD AUTO: 4.87 K/UL (ref 3.9–12.7)

## 2025-01-03 PROCEDURE — 85027 COMPLETE CBC AUTOMATED: CPT | Performed by: NURSE PRACTITIONER

## 2025-01-03 PROCEDURE — 96413 CHEMO IV INFUSION 1 HR: CPT

## 2025-01-03 PROCEDURE — 36415 COLL VENOUS BLD VENIPUNCTURE: CPT | Performed by: NURSE PRACTITIONER

## 2025-01-03 PROCEDURE — 96367 TX/PROPH/DG ADDL SEQ IV INF: CPT

## 2025-01-03 PROCEDURE — 80053 COMPREHEN METABOLIC PANEL: CPT | Performed by: NURSE PRACTITIONER

## 2025-01-03 PROCEDURE — 96375 TX/PRO/DX INJ NEW DRUG ADDON: CPT

## 2025-01-03 PROCEDURE — 63600175 PHARM REV CODE 636 W HCPCS: Performed by: STUDENT IN AN ORGANIZED HEALTH CARE EDUCATION/TRAINING PROGRAM

## 2025-01-03 PROCEDURE — A4216 STERILE WATER/SALINE, 10 ML: HCPCS | Performed by: STUDENT IN AN ORGANIZED HEALTH CARE EDUCATION/TRAINING PROGRAM

## 2025-01-03 PROCEDURE — 25000003 PHARM REV CODE 250: Performed by: STUDENT IN AN ORGANIZED HEALTH CARE EDUCATION/TRAINING PROGRAM

## 2025-01-03 RX ORDER — SODIUM CHLORIDE 0.9 % (FLUSH) 0.9 %
10 SYRINGE (ML) INJECTION
Status: DISCONTINUED | OUTPATIENT
Start: 2025-01-03 | End: 2025-01-03 | Stop reason: HOSPADM

## 2025-01-03 RX ORDER — PROCHLORPERAZINE EDISYLATE 5 MG/ML
10 INJECTION INTRAMUSCULAR; INTRAVENOUS ONCE AS NEEDED
Status: DISCONTINUED | OUTPATIENT
Start: 2025-01-03 | End: 2025-01-03 | Stop reason: HOSPADM

## 2025-01-03 RX ORDER — EPINEPHRINE 0.3 MG/.3ML
0.3 INJECTION SUBCUTANEOUS ONCE AS NEEDED
Status: DISCONTINUED | OUTPATIENT
Start: 2025-01-03 | End: 2025-01-03 | Stop reason: HOSPADM

## 2025-01-03 RX ORDER — FAMOTIDINE 10 MG/ML
20 INJECTION INTRAVENOUS
Status: COMPLETED | OUTPATIENT
Start: 2025-01-03 | End: 2025-01-03

## 2025-01-03 RX ORDER — MEPERIDINE HYDROCHLORIDE 50 MG/ML
25 INJECTION INTRAMUSCULAR; INTRAVENOUS; SUBCUTANEOUS ONCE AS NEEDED
Status: DISCONTINUED | OUTPATIENT
Start: 2025-01-03 | End: 2025-01-03 | Stop reason: HOSPADM

## 2025-01-03 RX ORDER — HEPARIN 100 UNIT/ML
500 SYRINGE INTRAVENOUS
Status: DISCONTINUED | OUTPATIENT
Start: 2025-01-03 | End: 2025-01-03 | Stop reason: HOSPADM

## 2025-01-03 RX ORDER — DEXAMETHASONE SODIUM PHOSPHATE 4 MG/ML
20 INJECTION, SOLUTION INTRA-ARTICULAR; INTRALESIONAL; INTRAMUSCULAR; INTRAVENOUS; SOFT TISSUE
Status: DISCONTINUED | OUTPATIENT
Start: 2025-01-03 | End: 2025-01-03

## 2025-01-03 RX ORDER — DIPHENHYDRAMINE HYDROCHLORIDE 50 MG/ML
50 INJECTION INTRAMUSCULAR; INTRAVENOUS ONCE AS NEEDED
Status: DISCONTINUED | OUTPATIENT
Start: 2025-01-03 | End: 2025-01-03 | Stop reason: HOSPADM

## 2025-01-03 RX ADMIN — FAMOTIDINE 20 MG: 10 INJECTION INTRAVENOUS at 12:01

## 2025-01-03 RX ADMIN — HEPARIN SODIUM (PORCINE) LOCK FLUSH IV SOLN 100 UNIT/ML 500 UNITS: 100 SOLUTION at 02:01

## 2025-01-03 RX ADMIN — Medication 10 ML: at 02:01

## 2025-01-03 RX ADMIN — TRASTUZUMAB-ANNS 600 MG: 420 INJECTION, POWDER, LYOPHILIZED, FOR SOLUTION INTRAVENOUS at 12:01

## 2025-01-03 RX ADMIN — DEXAMETHASONE SODIUM PHOSPHATE 20 MG: 4 INJECTION, SOLUTION INTRA-ARTICULAR; INTRALESIONAL; INTRAMUSCULAR; INTRAVENOUS; SOFT TISSUE at 12:01

## 2025-01-03 RX ADMIN — DIPHENHYDRAMINE HYDROCHLORIDE 50 MG: 50 INJECTION, SOLUTION INTRAMUSCULAR; INTRAVENOUS at 11:01

## 2025-01-03 RX ADMIN — SODIUM CHLORIDE: 9 INJECTION, SOLUTION INTRAVENOUS at 11:01

## 2025-01-03 NOTE — PLAN OF CARE
Pt ambulatory to clinic alone for [facility] infusion. Denies any sig complaints. Pt has drainage tube from L hip aprox 30 days post op. Developed an infection and is on 2nd course of ABX. MD aware. Port accessed without difficulty. Good blood return. Pt tolerated treatment well. Port deaccessed after flushing. Ambulatory from clinic in [facility].

## 2025-01-13 ENCOUNTER — PATIENT MESSAGE (OUTPATIENT)
Dept: HEMATOLOGY/ONCOLOGY | Facility: CLINIC | Age: 40
End: 2025-01-13
Payer: COMMERCIAL

## 2025-01-16 ENCOUNTER — PATIENT MESSAGE (OUTPATIENT)
Dept: HEMATOLOGY/ONCOLOGY | Facility: CLINIC | Age: 40
End: 2025-01-16
Payer: COMMERCIAL

## 2025-01-22 ENCOUNTER — TELEPHONE (OUTPATIENT)
Dept: HEMATOLOGY/ONCOLOGY | Facility: CLINIC | Age: 40
End: 2025-01-22
Payer: COMMERCIAL

## 2025-01-22 NOTE — TELEPHONE ENCOUNTER
Spoke to patient, she will do a virtual visit tomorrow. Will work to get her treatment r/s, she is okay with Saturday

## 2025-01-23 ENCOUNTER — OFFICE VISIT (OUTPATIENT)
Dept: HEMATOLOGY/ONCOLOGY | Facility: CLINIC | Age: 40
End: 2025-01-23
Payer: COMMERCIAL

## 2025-01-23 DIAGNOSIS — Z71.3 NUTRITIONAL COUNSELING: ICD-10-CM

## 2025-01-23 DIAGNOSIS — R60.0 LEG EDEMA: ICD-10-CM

## 2025-01-23 DIAGNOSIS — G47.00 INSOMNIA, UNSPECIFIED TYPE: Primary | ICD-10-CM

## 2025-01-23 DIAGNOSIS — R45.89 ANXIETY ABOUT MASTECTOMY: ICD-10-CM

## 2025-01-23 DIAGNOSIS — T45.1X5A CHEMOTHERAPY-INDUCED NEUROPATHY: ICD-10-CM

## 2025-01-23 DIAGNOSIS — C50.411 MALIGNANT NEOPLASM OF UPPER-OUTER QUADRANT OF RIGHT BREAST IN FEMALE, ESTROGEN RECEPTOR POSITIVE: ICD-10-CM

## 2025-01-23 DIAGNOSIS — Z17.0 MALIGNANT NEOPLASM OF UPPER-OUTER QUADRANT OF RIGHT BREAST IN FEMALE, ESTROGEN RECEPTOR POSITIVE: ICD-10-CM

## 2025-01-23 DIAGNOSIS — M25.50 ARTHRALGIA, UNSPECIFIED JOINT: ICD-10-CM

## 2025-01-23 DIAGNOSIS — E88.09 HYPOALBUMINEMIA: ICD-10-CM

## 2025-01-23 DIAGNOSIS — G62.0 CHEMOTHERAPY-INDUCED NEUROPATHY: ICD-10-CM

## 2025-01-23 RX ORDER — SODIUM CHLORIDE 0.9 % (FLUSH) 0.9 %
10 SYRINGE (ML) INJECTION
Status: CANCELLED | OUTPATIENT
Start: 2025-01-25

## 2025-01-23 RX ORDER — PROCHLORPERAZINE EDISYLATE 5 MG/ML
10 INJECTION INTRAMUSCULAR; INTRAVENOUS ONCE AS NEEDED
Status: CANCELLED | OUTPATIENT
Start: 2025-01-25

## 2025-01-23 RX ORDER — TRAZODONE HYDROCHLORIDE 50 MG/1
50 TABLET ORAL NIGHTLY
Qty: 30 TABLET | Refills: 11 | Status: SHIPPED | OUTPATIENT
Start: 2025-01-23 | End: 2026-01-23

## 2025-01-23 RX ORDER — DEXAMETHASONE SODIUM PHOSPHATE 4 MG/ML
20 INJECTION, SOLUTION INTRA-ARTICULAR; INTRALESIONAL; INTRAMUSCULAR; INTRAVENOUS; SOFT TISSUE
Status: CANCELLED
Start: 2025-01-25 | End: 2025-01-24

## 2025-01-23 RX ORDER — MEPERIDINE HYDROCHLORIDE 50 MG/ML
25 INJECTION INTRAMUSCULAR; INTRAVENOUS; SUBCUTANEOUS ONCE AS NEEDED
Status: CANCELLED | OUTPATIENT
Start: 2025-01-25

## 2025-01-23 RX ORDER — DIPHENHYDRAMINE HYDROCHLORIDE 50 MG/ML
50 INJECTION INTRAMUSCULAR; INTRAVENOUS ONCE AS NEEDED
OUTPATIENT
Start: 2025-02-15

## 2025-01-23 RX ORDER — DEXAMETHASONE SODIUM PHOSPHATE 4 MG/ML
20 INJECTION, SOLUTION INTRA-ARTICULAR; INTRALESIONAL; INTRAMUSCULAR; INTRAVENOUS; SOFT TISSUE
Start: 2025-02-15 | End: 2025-02-14

## 2025-01-23 RX ORDER — EPINEPHRINE 0.3 MG/.3ML
0.3 INJECTION SUBCUTANEOUS ONCE AS NEEDED
OUTPATIENT
Start: 2025-02-15

## 2025-01-23 RX ORDER — FAMOTIDINE 10 MG/ML
20 INJECTION INTRAVENOUS
Start: 2025-02-15 | End: 2025-02-14

## 2025-01-23 RX ORDER — SODIUM CHLORIDE 0.9 % (FLUSH) 0.9 %
10 SYRINGE (ML) INJECTION
OUTPATIENT
Start: 2025-02-15

## 2025-01-23 RX ORDER — DIPHENHYDRAMINE HYDROCHLORIDE 50 MG/ML
50 INJECTION INTRAMUSCULAR; INTRAVENOUS ONCE AS NEEDED
Status: CANCELLED | OUTPATIENT
Start: 2025-01-25

## 2025-01-23 RX ORDER — HEPARIN 100 UNIT/ML
500 SYRINGE INTRAVENOUS
Status: CANCELLED | OUTPATIENT
Start: 2025-01-25

## 2025-01-23 RX ORDER — FAMOTIDINE 10 MG/ML
20 INJECTION INTRAVENOUS
Status: CANCELLED
Start: 2025-01-25 | End: 2025-01-24

## 2025-01-23 RX ORDER — HEPARIN 100 UNIT/ML
500 SYRINGE INTRAVENOUS
OUTPATIENT
Start: 2025-02-15

## 2025-01-23 RX ORDER — EPINEPHRINE 0.3 MG/.3ML
0.3 INJECTION SUBCUTANEOUS ONCE AS NEEDED
Status: CANCELLED | OUTPATIENT
Start: 2025-01-25

## 2025-01-23 RX ORDER — MEPERIDINE HYDROCHLORIDE 50 MG/ML
25 INJECTION INTRAMUSCULAR; INTRAVENOUS; SUBCUTANEOUS ONCE AS NEEDED
OUTPATIENT
Start: 2025-02-15

## 2025-01-23 RX ORDER — PROCHLORPERAZINE EDISYLATE 5 MG/ML
10 INJECTION INTRAMUSCULAR; INTRAVENOUS ONCE AS NEEDED
OUTPATIENT
Start: 2025-02-15

## 2025-01-23 NOTE — PROGRESS NOTES
The patient location is: [place]  The chief complaint leading to consultation is: breast cancer    Visit type: audiovisual    Each patient to whom he or she provides medical services by telemedicine is:  (1) informed of the relationship between the physician and patient and the respective role of any other health care provider with respect to management of the patient; and (2) notified that he or she may decline to receive medical services by telemedicine and may withdraw from such care at any time.      [facility]/ [facility]      Chief Complaint:   HR+/Her2+ breast cancer      Cancer Staging   Malignant neoplasm of upper-outer quadrant of right breast in female, estrogen receptor positive  Staging form: Breast, AJCC 8th Edition  - Clinical stage from 1/18/2024: Stage IB (cT2, cN0, cM0, G3, ER+, SD+, HER2+) - Signed by Kassie Blakely MD on 1/18/2024    HPI:  Madhu Dela Cruz is a 40yo woman who presents today for evaluation of newly diagnosed breast cancer. Oncologic history below:     -6/7/2023 Screening mammogram with no evidence of malignancy   -1/4/2024 Diagnostic mammogram (palpable right breast mass) with  2.2 x 1.8 x 2.1 cm right breast mass   -1//8/2024 Needle biopsy of right breast with IDC grade 3, 8mm in greatest dimension, %, SD 40%, HER2 3+, Ki-67 >90%.  -1/16/24 Breast MRI with right breast 2.4 cm x 2.1 cm x 2.3 cm mass at the 3 o'clock position. A smaller benign appearing right breast mass. Normal axillary lymph nodes.   -started neoadjuvant TCHP 2/2024, completed 6 cycles 5/31/24  -MRI breast showing resolution of known breast mass   -on 7/8/24 she underwent bilateral mastectomies w flap reconstruction; final pathology of Rt breast: no residual carcinoma, 0/4 LN. ypT0N0 (see outside path scanned in)  -continued adjuvant trastuzumab; started adjuvant tamoxifen 8/30/24      Gyn History:   Menarche: Age 10   Menopause:  Pre    Age at first pregnancy: 15  : 2nd child, not first  HRT: Was on hormonal birth control, stopped   Does not desire fertility preservation.   Family History: Maternal aunt, diagnosed later in life. No ovarian cancer.     She lives in [facility] with her , they run a photo lubin business.        Interval history:   Madhu returns today for virtual follow up. She remains on antibiotics for infection at drain insertion site following reconstruction. Cultures were positive for staph and she remains on keflex w 3 days remaining. Does report that pain and swelling have significantly improved. She is otherwise feeling well. Has been holding tamoxifen while on antibiotics in case washout was required. Remains on jardiance which has improved her shortness of breath and LE swelling- has not needed lasix. Does not some difficulty sleeping; moreso trouble falling asleep than staying asleep. Attributes this to her mind racing. Lexapro increased to 10mg 1-2 months ago. No additional concerns today.           Patient Active Problem List   Diagnosis    Malignant neoplasm of upper-outer quadrant of right breast in female, estrogen receptor positive    Chemotherapy induced diarrhea    Dehydration       Current Outpatient Medications   Medication Instructions    duke's soln (benadryl 30 mL, mylanta 30 mL, LIDOcaine 30 mL, nystatin 30 mL) 120mL 10 mLs, Oral, 4 times daily, Swish and swallow    ergocalciferol, vitamin D2, (VITAMIN D ORAL) No dose, route, or frequency recorded.    EScitalopram oxalate (LEXAPRO) 5 mg, Oral, Daily    gabapentin (NEURONTIN) 300 mg, Oral, 2 times daily    LACTOBACILLUS ACIDOPHILUS ORAL No dose, route, or frequency recorded.    LIDOcaine-prilocaine (EMLA) cream Topical (Top), As needed (PRN)    metoprolol tartrate (LOPRESSOR) 25 mg, Oral, 2 times daily    ondansetron (ZOFRAN) 4 MG tablet No dose, route, or frequency recorded.    oxyCODONE (ROXICODONE) 5 mg, Oral, Every 4 hours  PRN    phenazopyridine (PYRIDIUM) 100 mg, Oral, 3 times daily PRN    prochlorperazine (COMPAZINE) 10 mg, Oral, Every 6 hours PRN    tamoxifen (NOLVADEX) 20 mg, Oral, Daily       Review of Systems:   Answers submitted by the patient for this visit:  Review of Systems Questionnaire (Submitted on 1/23/2025)  appetite change : No  unexpected weight change: No  mouth sores: No  visual disturbance: No  cough: No  shortness of breath: No  chest pain: No  abdominal pain: No  diarrhea: No  frequency: No  back pain: No  rash: No  headaches: No  adenopathy: No  nervous/ anxious: No      PHYSICAL EXAM:  There were no vitals taken for this visit. Virtual visit   ECOG 0   G.eneral: well appearing, in no apparent distress  HEENT: Normocephalic, EOMI  Lungs: no increased wob  Skin: no rash  Neurologic: Alert and oriented x 4, normal speech   Psychiatric: Conversing appropriately with providers throughout today's encounter.      Pertinent Labs & Imaging:  Reviewed recent labs, imaging and pathology.     Assessment & Plan:  Ms. Dela Cruz is a cuco 38yo woman with recently diagnosed cT2N0 HR+/Her2+ breast cancer who presents today for follow up.     Given that her tumor is as least T2N0, proceeded with neoadjuvant treatment with TCHP. She completed 6 cycles and is s/p bilateral mastectomies on 7/8/24 demonstrating pCR.    In the setting of pCR, have recommend continuing with trastuzumab only q3 weeks for the remainder of one year. Have since added tamoxifen to address HR+ component of her breast cancer which she is tolerating well thus far. She was in agreement with the above plan.       #HR+/Her2+ breast cancer:  --proceed with trastuzumab q3 weeks as scheduled   -- BRCA testing negative (no pathologic mutations, had to VUS in met and pten genes)  --s/p referral to integrative onc  --echo 12/18/24 w stable EF 60-65%; repeat due 3/2025  --cont tamoxifen (SOT 8/2024), goal treatment for 5 years- will resume this after she completes  antibiotics in a few days   --RTC as scheduled       #Drain infection: cultures + for staph  --remains on keflex, 3 days left w improving symptoms  --she will cont to monitor this area closely and reach out if symptoms return      #Joint pain: likely exacerbated by tamoxifen. Improved for now   --cont toradol as needed,  --referral placed to acupuncture previously       #Chemo induced neuropathy- improved  --started on gabapentin 300 mg by surgery team- taking twice daily   --continue as has improved neuropathy       #Leg swelling: improving  --cont 20 mg lasix prn - not currently needing this  --now on jardiance which has helped  --low albumin may be playing a role       #Hypoalbuminemia  --referred to nutrition previously      #Anxiety   --cont lexapro 10 mg     #Insomnia:  --will order trial of trazodone         All questions were answered to her apparent satisfaction. Will see her back in 6 weeks or sooner should the need arise.          Route Chart for Scheduling    Med Onc Chart Routing      Follow up with physician    Follow up with GILBERTO . As scheduled   Infusion scheduling note   as scheduled   Injection scheduling note    Labs CBC and CMP   Scheduling:  Preferred lab:  Lab interval:  every 6 weeks (does not need labs w upcoming infusion)   Imaging    Pharmacy appointment    Other referrals                  Treatment Plan Information   OP BREAST TCHP (pertuzumab trastuzumab DOCEtaxel CARBOplatin) Q3W Kassie Blakely MD   Associated diagnosis: Malignant neoplasm of upper-outer quadrant of right breast in female, estrogen receptor positive Stage IB cT2, cN0, cM0, G3, ER+, WI+, HER2+ noted on 1/17/2024   Line of treatment: First Line  Treatment Goal: Curative     Upcoming Treatment Dates - OP BREAST TCHP (pertuzumab trastuzumab DOCEtaxel CARBOplatin) Q3W    1/24/2025       Chemotherapy       trastuzumab-anns (KANJINTI) 621 mg in 0.9% NaCl 250 mL chemo infusion       Supportive Care       prochlorperazine  injection 10 mg       meperidine injection 25 mg       Antiemetics       dexAMETHasone injection 20 mg       famotidine (PF) injection 20 mg       diphenhydrAMINE (BENADRYL) 50 mg in NS 50 mL IVPB  2/14/2025       Chemotherapy       trastuzumab-anns (KANJINTI) 621 mg in 0.9% NaCl 250 mL chemo infusion       Supportive Care       prochlorperazine injection 10 mg       meperidine injection 25 mg       Antiemetics       dexAMETHasone injection 20 mg       famotidine (PF) injection 20 mg       diphenhydrAMINE (BENADRYL) 50 mg in NS 50 mL IVPB  3/7/2025       Chemotherapy       trastuzumab-anns (KANJINTI) 621 mg in 0.9% NaCl 250 mL chemo infusion       Supportive Care       prochlorperazine injection 10 mg       meperidine injection 25 mg       Antiemetics       dexAMETHasone injection 20 mg       famotidine (PF) injection 20 mg       diphenhydrAMINE (BENADRYL) 50 mg in NS 50 mL IVPB    Supportive Plan Information  IV FLUIDS AND ELECTROLYTES Kassie Blakely MD   Associated Diagnosis: Dehydration   noted on 2/22/2024  Associated Diagnosis: Chemotherapy induced diarrhea   noted on 2/22/2024  Associated Diagnosis: Malignant neoplasm of upper-outer quadrant of right breast in female, estrogen receptor positive Stage IB cT2, cN0, cM0, G3, ER+, SC+, HER2+ noted on 1/17/2024   Line of treatment: Supportive Care   Treatment goal: Supportive     Upcoming Treatment Dates - IV FLUIDS AND ELECTROLYTES    No upcoming days in selected categories.      Total time of this visit, including time spent face to face with patient and/or via video/audio, and also in preparing for today's visit for MDM and documentation. (Medical Decision Making, including consideration of possible diagnoses, management options, complex medical record review, review of diagnostic tests and information, consideration and discussion of significant complications based on comorbidities, and discussion with providers involved with the care of the patient) 30  minutes. Greater than 50% was spent face to face with the patient counseling and coordinating care.        Kassie Blakely MD

## 2025-01-25 ENCOUNTER — INFUSION (OUTPATIENT)
Dept: INFUSION THERAPY | Facility: HOSPITAL | Age: 40
End: 2025-01-25
Payer: COMMERCIAL

## 2025-01-25 VITALS
RESPIRATION RATE: 18 BRPM | HEIGHT: 64 IN | SYSTOLIC BLOOD PRESSURE: 120 MMHG | OXYGEN SATURATION: 97 % | WEIGHT: 238.31 LBS | DIASTOLIC BLOOD PRESSURE: 61 MMHG | TEMPERATURE: 98 F | HEART RATE: 83 BPM | BODY MASS INDEX: 40.69 KG/M2

## 2025-01-25 DIAGNOSIS — Z17.0 MALIGNANT NEOPLASM OF UPPER-OUTER QUADRANT OF RIGHT BREAST IN FEMALE, ESTROGEN RECEPTOR POSITIVE: Primary | ICD-10-CM

## 2025-01-25 DIAGNOSIS — C50.411 MALIGNANT NEOPLASM OF UPPER-OUTER QUADRANT OF RIGHT BREAST IN FEMALE, ESTROGEN RECEPTOR POSITIVE: Primary | ICD-10-CM

## 2025-01-25 PROCEDURE — 96375 TX/PRO/DX INJ NEW DRUG ADDON: CPT

## 2025-01-25 PROCEDURE — 96367 TX/PROPH/DG ADDL SEQ IV INF: CPT

## 2025-01-25 PROCEDURE — 25000003 PHARM REV CODE 250: Performed by: STUDENT IN AN ORGANIZED HEALTH CARE EDUCATION/TRAINING PROGRAM

## 2025-01-25 PROCEDURE — 96413 CHEMO IV INFUSION 1 HR: CPT

## 2025-01-25 PROCEDURE — A4216 STERILE WATER/SALINE, 10 ML: HCPCS | Performed by: STUDENT IN AN ORGANIZED HEALTH CARE EDUCATION/TRAINING PROGRAM

## 2025-01-25 PROCEDURE — 63600175 PHARM REV CODE 636 W HCPCS: Performed by: STUDENT IN AN ORGANIZED HEALTH CARE EDUCATION/TRAINING PROGRAM

## 2025-01-25 RX ORDER — HEPARIN 100 UNIT/ML
500 SYRINGE INTRAVENOUS
Status: DISCONTINUED | OUTPATIENT
Start: 2025-01-25 | End: 2025-01-25 | Stop reason: HOSPADM

## 2025-01-25 RX ORDER — SODIUM CHLORIDE 0.9 % (FLUSH) 0.9 %
10 SYRINGE (ML) INJECTION
Status: DISCONTINUED | OUTPATIENT
Start: 2025-01-25 | End: 2025-01-25 | Stop reason: HOSPADM

## 2025-01-25 RX ORDER — DEXAMETHASONE SODIUM PHOSPHATE 4 MG/ML
20 INJECTION, SOLUTION INTRA-ARTICULAR; INTRALESIONAL; INTRAMUSCULAR; INTRAVENOUS; SOFT TISSUE
Status: DISCONTINUED | OUTPATIENT
Start: 2025-01-25 | End: 2025-01-25

## 2025-01-25 RX ORDER — PROCHLORPERAZINE EDISYLATE 5 MG/ML
10 INJECTION INTRAMUSCULAR; INTRAVENOUS ONCE AS NEEDED
Status: DISCONTINUED | OUTPATIENT
Start: 2025-01-25 | End: 2025-01-25 | Stop reason: HOSPADM

## 2025-01-25 RX ORDER — DIPHENHYDRAMINE HYDROCHLORIDE 50 MG/ML
50 INJECTION INTRAMUSCULAR; INTRAVENOUS ONCE AS NEEDED
Status: DISCONTINUED | OUTPATIENT
Start: 2025-01-25 | End: 2025-01-25 | Stop reason: HOSPADM

## 2025-01-25 RX ORDER — MEPERIDINE HYDROCHLORIDE 50 MG/ML
25 INJECTION INTRAMUSCULAR; INTRAVENOUS; SUBCUTANEOUS ONCE AS NEEDED
Status: DISCONTINUED | OUTPATIENT
Start: 2025-01-25 | End: 2025-01-25 | Stop reason: HOSPADM

## 2025-01-25 RX ORDER — EPINEPHRINE 0.3 MG/.3ML
0.3 INJECTION SUBCUTANEOUS ONCE AS NEEDED
Status: DISCONTINUED | OUTPATIENT
Start: 2025-01-25 | End: 2025-01-25 | Stop reason: HOSPADM

## 2025-01-25 RX ORDER — FAMOTIDINE 10 MG/ML
20 INJECTION INTRAVENOUS
Status: COMPLETED | OUTPATIENT
Start: 2025-01-25 | End: 2025-01-25

## 2025-01-25 RX ADMIN — Medication 10 ML: at 12:01

## 2025-01-25 RX ADMIN — HEPARIN 500 UNITS: 100 SYRINGE at 12:01

## 2025-01-25 RX ADMIN — FAMOTIDINE 20 MG: 10 INJECTION INTRAVENOUS at 10:01

## 2025-01-25 RX ADMIN — DIPHENHYDRAMINE HYDROCHLORIDE 50 MG: 50 INJECTION INTRAMUSCULAR; INTRAVENOUS at 10:01

## 2025-01-25 RX ADMIN — TRASTUZUMAB-ANNS 600 MG: 420 INJECTION, POWDER, LYOPHILIZED, FOR SOLUTION INTRAVENOUS at 10:01

## 2025-01-25 RX ADMIN — DEXAMETHASONE SODIUM PHOSPHATE 20 MG: 4 INJECTION, SOLUTION INTRA-ARTICULAR; INTRALESIONAL; INTRAMUSCULAR; INTRAVENOUS; SOFT TISSUE at 09:01

## 2025-01-25 NOTE — PLAN OF CARE
"/61 (Patient Position: Sitting)   Pulse 83   Temp 98.3 °F (36.8 °C)   Resp 18   Ht 5' 4" (1.626 m)   Wt 108.1 kg (238 lb 5.1 oz)   SpO2 97%   BMI 40.91 kg/m² Pleasant, alert and oriented patient to Chemo infusion per self with  for C15 Kanjinti over 1hour - VSS and LCW Port accessed with blood return observed, flushed with NS, dressing applied and patient tolerated procedure well - patient tolerated treatment with no AVE's, port flushed with NS/Heparin, blood return observed, port de-accessed, band-aide applied and patient discharged to home with no concerns - RTC on 2/13/25     "

## 2025-02-13 ENCOUNTER — INFUSION (OUTPATIENT)
Dept: INFUSION THERAPY | Facility: HOSPITAL | Age: 40
End: 2025-02-13
Payer: COMMERCIAL

## 2025-02-13 ENCOUNTER — LAB VISIT (OUTPATIENT)
Dept: LAB | Facility: HOSPITAL | Age: 40
End: 2025-02-13
Attending: STUDENT IN AN ORGANIZED HEALTH CARE EDUCATION/TRAINING PROGRAM
Payer: COMMERCIAL

## 2025-02-13 ENCOUNTER — PATIENT MESSAGE (OUTPATIENT)
Dept: HEMATOLOGY/ONCOLOGY | Facility: CLINIC | Age: 40
End: 2025-02-13
Payer: COMMERCIAL

## 2025-02-13 VITALS
DIASTOLIC BLOOD PRESSURE: 64 MMHG | TEMPERATURE: 98 F | SYSTOLIC BLOOD PRESSURE: 128 MMHG | BODY MASS INDEX: 40.87 KG/M2 | HEART RATE: 88 BPM | WEIGHT: 238.13 LBS | OXYGEN SATURATION: 95 % | RESPIRATION RATE: 18 BRPM

## 2025-02-13 DIAGNOSIS — C50.411 MALIGNANT NEOPLASM OF UPPER-OUTER QUADRANT OF RIGHT BREAST IN FEMALE, ESTROGEN RECEPTOR POSITIVE: ICD-10-CM

## 2025-02-13 DIAGNOSIS — Z17.0 MALIGNANT NEOPLASM OF UPPER-OUTER QUADRANT OF RIGHT BREAST IN FEMALE, ESTROGEN RECEPTOR POSITIVE: ICD-10-CM

## 2025-02-13 DIAGNOSIS — C50.411 MALIGNANT NEOPLASM OF UPPER-OUTER QUADRANT OF RIGHT BREAST IN FEMALE, ESTROGEN RECEPTOR POSITIVE: Primary | ICD-10-CM

## 2025-02-13 DIAGNOSIS — Z17.0 MALIGNANT NEOPLASM OF UPPER-OUTER QUADRANT OF RIGHT BREAST IN FEMALE, ESTROGEN RECEPTOR POSITIVE: Primary | ICD-10-CM

## 2025-02-13 LAB
ALBUMIN SERPL BCP-MCNC: 3.3 G/DL (ref 3.5–5.2)
ALP SERPL-CCNC: 105 U/L (ref 40–150)
ALT SERPL W/O P-5'-P-CCNC: 49 U/L (ref 10–44)
ANION GAP SERPL CALC-SCNC: 9 MMOL/L (ref 8–16)
AST SERPL-CCNC: 42 U/L (ref 10–40)
BASOPHILS # BLD AUTO: 0.01 K/UL (ref 0–0.2)
BASOPHILS NFR BLD: 0.1 % (ref 0–1.9)
BILIRUB SERPL-MCNC: 0.3 MG/DL (ref 0.1–1)
BUN SERPL-MCNC: 11 MG/DL (ref 6–20)
CALCIUM SERPL-MCNC: 8.8 MG/DL (ref 8.7–10.5)
CHLORIDE SERPL-SCNC: 105 MMOL/L (ref 95–110)
CO2 SERPL-SCNC: 24 MMOL/L (ref 23–29)
CREAT SERPL-MCNC: 0.7 MG/DL (ref 0.5–1.4)
DIFFERENTIAL METHOD BLD: ABNORMAL
EOSINOPHIL # BLD AUTO: 0.2 K/UL (ref 0–0.5)
EOSINOPHIL NFR BLD: 2.8 % (ref 0–8)
ERYTHROCYTE [DISTWIDTH] IN BLOOD BY AUTOMATED COUNT: 15 % (ref 11.5–14.5)
EST. GFR  (NO RACE VARIABLE): >60 ML/MIN/1.73 M^2
GLUCOSE SERPL-MCNC: 84 MG/DL (ref 70–110)
HCT VFR BLD AUTO: 37.3 % (ref 37–48.5)
HGB BLD-MCNC: 11.6 G/DL (ref 12–16)
IMM GRANULOCYTES # BLD AUTO: 0.02 K/UL (ref 0–0.04)
IMM GRANULOCYTES NFR BLD AUTO: 0.3 % (ref 0–0.5)
LYMPHOCYTES # BLD AUTO: 3.2 K/UL (ref 1–4.8)
LYMPHOCYTES NFR BLD: 43.1 % (ref 18–48)
MCH RBC QN AUTO: 25.2 PG (ref 27–31)
MCHC RBC AUTO-ENTMCNC: 31.1 G/DL (ref 32–36)
MCV RBC AUTO: 81 FL (ref 82–98)
MONOCYTES # BLD AUTO: 0.8 K/UL (ref 0.3–1)
MONOCYTES NFR BLD: 10.6 % (ref 4–15)
NEUTROPHILS # BLD AUTO: 3.2 K/UL (ref 1.8–7.7)
NEUTROPHILS NFR BLD: 43.1 % (ref 38–73)
NRBC BLD-RTO: 0 /100 WBC
PLATELET # BLD AUTO: 275 K/UL (ref 150–450)
PMV BLD AUTO: 8.9 FL (ref 9.2–12.9)
POTASSIUM SERPL-SCNC: 4 MMOL/L (ref 3.5–5.1)
PROT SERPL-MCNC: 6.9 G/DL (ref 6–8.4)
RBC # BLD AUTO: 4.6 M/UL (ref 4–5.4)
SODIUM SERPL-SCNC: 138 MMOL/L (ref 136–145)
WBC # BLD AUTO: 7.45 K/UL (ref 3.9–12.7)

## 2025-02-13 PROCEDURE — 85025 COMPLETE CBC W/AUTO DIFF WBC: CPT | Performed by: STUDENT IN AN ORGANIZED HEALTH CARE EDUCATION/TRAINING PROGRAM

## 2025-02-13 PROCEDURE — 25000003 PHARM REV CODE 250: Performed by: STUDENT IN AN ORGANIZED HEALTH CARE EDUCATION/TRAINING PROGRAM

## 2025-02-13 PROCEDURE — 96375 TX/PRO/DX INJ NEW DRUG ADDON: CPT

## 2025-02-13 PROCEDURE — 96367 TX/PROPH/DG ADDL SEQ IV INF: CPT

## 2025-02-13 PROCEDURE — 36415 COLL VENOUS BLD VENIPUNCTURE: CPT | Performed by: STUDENT IN AN ORGANIZED HEALTH CARE EDUCATION/TRAINING PROGRAM

## 2025-02-13 PROCEDURE — 63600175 PHARM REV CODE 636 W HCPCS: Performed by: STUDENT IN AN ORGANIZED HEALTH CARE EDUCATION/TRAINING PROGRAM

## 2025-02-13 PROCEDURE — 96413 CHEMO IV INFUSION 1 HR: CPT

## 2025-02-13 PROCEDURE — 80053 COMPREHEN METABOLIC PANEL: CPT | Performed by: STUDENT IN AN ORGANIZED HEALTH CARE EDUCATION/TRAINING PROGRAM

## 2025-02-13 RX ORDER — DEXAMETHASONE SODIUM PHOSPHATE 4 MG/ML
20 INJECTION, SOLUTION INTRA-ARTICULAR; INTRALESIONAL; INTRAMUSCULAR; INTRAVENOUS; SOFT TISSUE
Status: COMPLETED | OUTPATIENT
Start: 2025-02-13 | End: 2025-02-13

## 2025-02-13 RX ORDER — EPINEPHRINE 0.3 MG/.3ML
0.3 INJECTION SUBCUTANEOUS ONCE AS NEEDED
Status: DISCONTINUED | OUTPATIENT
Start: 2025-02-13 | End: 2025-02-13 | Stop reason: HOSPADM

## 2025-02-13 RX ORDER — MEPERIDINE HYDROCHLORIDE 50 MG/ML
25 INJECTION INTRAMUSCULAR; INTRAVENOUS; SUBCUTANEOUS ONCE AS NEEDED
Status: DISCONTINUED | OUTPATIENT
Start: 2025-02-13 | End: 2025-02-13 | Stop reason: HOSPADM

## 2025-02-13 RX ORDER — HEPARIN 100 UNIT/ML
500 SYRINGE INTRAVENOUS
Status: DISCONTINUED | OUTPATIENT
Start: 2025-02-13 | End: 2025-02-13 | Stop reason: HOSPADM

## 2025-02-13 RX ORDER — PROCHLORPERAZINE EDISYLATE 5 MG/ML
10 INJECTION INTRAMUSCULAR; INTRAVENOUS ONCE AS NEEDED
Status: DISCONTINUED | OUTPATIENT
Start: 2025-02-13 | End: 2025-02-13 | Stop reason: HOSPADM

## 2025-02-13 RX ORDER — DIPHENHYDRAMINE HYDROCHLORIDE 50 MG/ML
50 INJECTION INTRAMUSCULAR; INTRAVENOUS ONCE AS NEEDED
Status: DISCONTINUED | OUTPATIENT
Start: 2025-02-13 | End: 2025-02-13 | Stop reason: HOSPADM

## 2025-02-13 RX ORDER — FAMOTIDINE 10 MG/ML
20 INJECTION INTRAVENOUS
Status: COMPLETED | OUTPATIENT
Start: 2025-02-13 | End: 2025-02-13

## 2025-02-13 RX ORDER — SODIUM CHLORIDE 0.9 % (FLUSH) 0.9 %
10 SYRINGE (ML) INJECTION
Status: DISCONTINUED | OUTPATIENT
Start: 2025-02-13 | End: 2025-02-13 | Stop reason: HOSPADM

## 2025-02-13 RX ADMIN — HEPARIN 500 UNITS: 100 SYRINGE at 04:02

## 2025-02-13 RX ADMIN — TRASTUZUMAB-ANNS 600 MG: 420 INJECTION, POWDER, LYOPHILIZED, FOR SOLUTION INTRAVENOUS at 03:02

## 2025-02-13 RX ADMIN — DEXAMETHASONE SODIUM PHOSPHATE 20 MG: 4 INJECTION, SOLUTION INTRAMUSCULAR; INTRAVENOUS at 03:02

## 2025-02-13 RX ADMIN — FAMOTIDINE 20 MG: 10 INJECTION, SOLUTION INTRAVENOUS at 03:02

## 2025-02-13 RX ADMIN — DIPHENHYDRAMINE HYDROCHLORIDE 50 MG: 50 INJECTION INTRAMUSCULAR; INTRAVENOUS at 03:02

## 2025-02-14 ENCOUNTER — PATIENT MESSAGE (OUTPATIENT)
Dept: HEMATOLOGY/ONCOLOGY | Facility: CLINIC | Age: 40
End: 2025-02-14
Payer: COMMERCIAL

## 2025-02-27 ENCOUNTER — LAB VISIT (OUTPATIENT)
Dept: LAB | Facility: HOSPITAL | Age: 40
End: 2025-02-27
Attending: NURSE PRACTITIONER
Payer: COMMERCIAL

## 2025-02-27 DIAGNOSIS — Z17.0 MALIGNANT NEOPLASM OF UPPER-OUTER QUADRANT OF RIGHT BREAST IN FEMALE, ESTROGEN RECEPTOR POSITIVE: ICD-10-CM

## 2025-02-27 DIAGNOSIS — C50.411 MALIGNANT NEOPLASM OF UPPER-OUTER QUADRANT OF RIGHT BREAST IN FEMALE, ESTROGEN RECEPTOR POSITIVE: ICD-10-CM

## 2025-02-27 LAB
ALBUMIN SERPL BCP-MCNC: 3 G/DL (ref 3.5–5.2)
ALP SERPL-CCNC: 102 U/L (ref 40–150)
ALT SERPL W/O P-5'-P-CCNC: 55 U/L (ref 10–44)
ANION GAP SERPL CALC-SCNC: 8 MMOL/L (ref 8–16)
AST SERPL-CCNC: 70 U/L (ref 10–40)
BILIRUB SERPL-MCNC: 0.4 MG/DL (ref 0.1–1)
BUN SERPL-MCNC: 13 MG/DL (ref 6–20)
CALCIUM SERPL-MCNC: 8.6 MG/DL (ref 8.7–10.5)
CHLORIDE SERPL-SCNC: 104 MMOL/L (ref 95–110)
CO2 SERPL-SCNC: 24 MMOL/L (ref 23–29)
CREAT SERPL-MCNC: 0.8 MG/DL (ref 0.5–1.4)
ERYTHROCYTE [DISTWIDTH] IN BLOOD BY AUTOMATED COUNT: 15.9 % (ref 11.5–14.5)
EST. GFR  (NO RACE VARIABLE): >60 ML/MIN/1.73 M^2
GLUCOSE SERPL-MCNC: 99 MG/DL (ref 70–110)
HCT VFR BLD AUTO: 38.2 % (ref 37–48.5)
HGB BLD-MCNC: 11.5 G/DL (ref 12–16)
IMM GRANULOCYTES # BLD AUTO: 0.01 K/UL (ref 0–0.04)
MCH RBC QN AUTO: 24.3 PG (ref 27–31)
MCHC RBC AUTO-ENTMCNC: 30.1 G/DL (ref 32–36)
MCV RBC AUTO: 81 FL (ref 82–98)
NEUTROPHILS # BLD AUTO: 4.5 K/UL (ref 1.8–7.7)
PLATELET # BLD AUTO: 227 K/UL (ref 150–450)
PMV BLD AUTO: 9.9 FL (ref 9.2–12.9)
POTASSIUM SERPL-SCNC: 4 MMOL/L (ref 3.5–5.1)
PROT SERPL-MCNC: 6.8 G/DL (ref 6–8.4)
RBC # BLD AUTO: 4.74 M/UL (ref 4–5.4)
SODIUM SERPL-SCNC: 136 MMOL/L (ref 136–145)
WBC # BLD AUTO: 7.96 K/UL (ref 3.9–12.7)

## 2025-02-27 PROCEDURE — 36415 COLL VENOUS BLD VENIPUNCTURE: CPT | Mod: PN | Performed by: NURSE PRACTITIONER

## 2025-02-27 PROCEDURE — 80053 COMPREHEN METABOLIC PANEL: CPT | Performed by: NURSE PRACTITIONER

## 2025-02-27 PROCEDURE — 85027 COMPLETE CBC AUTOMATED: CPT | Performed by: NURSE PRACTITIONER

## 2025-02-28 ENCOUNTER — OFFICE VISIT (OUTPATIENT)
Dept: HEMATOLOGY/ONCOLOGY | Facility: CLINIC | Age: 40
End: 2025-02-28
Payer: COMMERCIAL

## 2025-02-28 DIAGNOSIS — C50.411 MALIGNANT NEOPLASM OF UPPER-OUTER QUADRANT OF RIGHT BREAST IN FEMALE, ESTROGEN RECEPTOR POSITIVE: Primary | ICD-10-CM

## 2025-02-28 DIAGNOSIS — G62.0 CHEMOTHERAPY-INDUCED NEUROPATHY: ICD-10-CM

## 2025-02-28 DIAGNOSIS — R45.89 ANXIETY ABOUT MASTECTOMY: ICD-10-CM

## 2025-02-28 DIAGNOSIS — T45.1X5A CHEMOTHERAPY-INDUCED NEUROPATHY: ICD-10-CM

## 2025-02-28 DIAGNOSIS — Z17.0 MALIGNANT NEOPLASM OF UPPER-OUTER QUADRANT OF RIGHT BREAST IN FEMALE, ESTROGEN RECEPTOR POSITIVE: Primary | ICD-10-CM

## 2025-02-28 DIAGNOSIS — G47.00 INSOMNIA, UNSPECIFIED TYPE: ICD-10-CM

## 2025-02-28 DIAGNOSIS — R79.89 ELEVATED LFTS: ICD-10-CM

## 2025-02-28 DIAGNOSIS — E88.09 HYPOALBUMINEMIA: ICD-10-CM

## 2025-02-28 DIAGNOSIS — M25.50 ARTHRALGIA, UNSPECIFIED JOINT: ICD-10-CM

## 2025-02-28 RX ORDER — DIPHENHYDRAMINE HYDROCHLORIDE 50 MG/ML
50 INJECTION INTRAMUSCULAR; INTRAVENOUS ONCE AS NEEDED
OUTPATIENT
Start: 2025-03-06

## 2025-02-28 RX ORDER — DEXAMETHASONE SODIUM PHOSPHATE 4 MG/ML
20 INJECTION, SOLUTION INTRA-ARTICULAR; INTRALESIONAL; INTRAMUSCULAR; INTRAVENOUS; SOFT TISSUE
Start: 2025-03-06 | End: 2025-03-06

## 2025-02-28 RX ORDER — PROCHLORPERAZINE EDISYLATE 5 MG/ML
10 INJECTION INTRAMUSCULAR; INTRAVENOUS ONCE AS NEEDED
OUTPATIENT
Start: 2025-03-06

## 2025-02-28 RX ORDER — SODIUM CHLORIDE 0.9 % (FLUSH) 0.9 %
10 SYRINGE (ML) INJECTION
OUTPATIENT
Start: 2025-03-06

## 2025-02-28 RX ORDER — FAMOTIDINE 10 MG/ML
20 INJECTION INTRAVENOUS
Start: 2025-03-06 | End: 2025-03-06

## 2025-02-28 RX ORDER — EPINEPHRINE 0.3 MG/.3ML
0.3 INJECTION SUBCUTANEOUS ONCE AS NEEDED
OUTPATIENT
Start: 2025-03-06

## 2025-02-28 RX ORDER — HEPARIN 100 UNIT/ML
500 SYRINGE INTRAVENOUS
OUTPATIENT
Start: 2025-03-06

## 2025-02-28 RX ORDER — MEPERIDINE HYDROCHLORIDE 50 MG/ML
25 INJECTION INTRAMUSCULAR; INTRAVENOUS; SUBCUTANEOUS ONCE AS NEEDED
OUTPATIENT
Start: 2025-03-06

## 2025-02-28 NOTE — PROGRESS NOTES
The patient location is: [place]  The chief complaint leading to consultation is: breast cancer    Visit type: audiovisual    Each patient to whom he or she provides medical services by telemedicine is:  (1) informed of the relationship between the physician and patient and the respective role of any other health care provider with respect to management of the patient; and (2) notified that he or she may decline to receive medical services by telemedicine and may withdraw from such care at any time.      [facility]/ [facility]      Chief Complaint:   HR+/Her2+ breast cancer      Cancer Staging   Malignant neoplasm of upper-outer quadrant of right breast in female, estrogen receptor positive  Staging form: Breast, AJCC 8th Edition  - Clinical stage from 1/18/2024: Stage IB (cT2, cN0, cM0, G3, ER+, KY+, HER2+) - Signed by Kassie Blakely MD on 1/18/2024    HPI:  Madhu Dela Cruz is a 38yo woman who presents today for evaluation of newly diagnosed breast cancer. Oncologic history below:     Per Dr. Blakely's note:   -6/7/2023 Screening mammogram with no evidence of malignancy   -1/4/2024 Diagnostic mammogram (palpable right breast mass) with  2.2 x 1.8 x 2.1 cm right breast mass   -1//8/2024 Needle biopsy of right breast with IDC grade 3, 8mm in greatest dimension, %, KY 40%, HER2 3+, Ki-67 >90%.  -1/16/24 Breast MRI with right breast 2.4 cm x 2.1 cm x 2.3 cm mass at the 3 o'clock position. A smaller benign appearing right breast mass. Normal axillary lymph nodes.   -started neoadjuvant TCHP 2/2024, completed 6 cycles 5/31/24  -MRI breast showing resolution of known breast mass   -on 7/8/24 she underwent bilateral mastectomies w flap reconstruction; final pathology of Rt breast: no residual carcinoma, 0/4 LN. ypT0N0 (see outside path scanned in)  -continued adjuvant trastuzumab; started adjuvant tamoxifen 8/30/24    Gyn History:   Menarche:  Age 10   Menopause: Pre    Age at first pregnancy: 15  : 2nd child, not first  HRT: Was on hormonal birth control, stopped   Does not desire fertility preservation.   Family History: Maternal aunt, diagnosed later in life. No ovarian cancer.     She lives in [facility] with her , they run a photo lubin business.        Interval history:   Madhu returns today for virtual follow up. She will be getting her last infusion of adjuvant trastuzumab next week.  Her drain site infection after reconstruction has resolved.  She has completed antibiotics and had a follow up with plastics.  She remains on jardiance which has improved her shortness of breath and LE swelling- has lasix as needed, not using often. Does note difficulty sleeping; moreso trouble falling asleep than staying asleep. Attributes this to her mind racing. Lexapro increased to 10mg 1-2 months ago. No additional concerns today.     Started on wellbutrin in January, stopped it bc liver enzymes started to increase and she thinks this may be the cause  Not sleeping well at night, having a hard time falling asleep, No improvement with trazodone, but not taking consistently  Has some joint pain  Hot flashes have improved, tolerable    Patient Active Problem List   Diagnosis    Malignant neoplasm of upper-outer quadrant of right breast in female, estrogen receptor positive    Chemotherapy induced diarrhea    Dehydration       Current Outpatient Medications   Medication Instructions    duke's soln (benadryl 30 mL, mylanta 30 mL, LIDOcaine 30 mL, nystatin 30 mL) 120mL 10 mLs, Oral, 4 times daily, Swish and swallow    ergocalciferol, vitamin D2, (VITAMIN D ORAL) No dose, route, or frequency recorded.    EScitalopram oxalate (LEXAPRO) 5 mg, Oral, Daily    gabapentin (NEURONTIN) 300 mg, Oral, 2 times daily    LACTOBACILLUS ACIDOPHILUS ORAL No dose, route, or frequency recorded.    LIDOcaine-prilocaine (EMLA) cream Topical (Top), As needed  (PRN)    metoprolol tartrate (LOPRESSOR) 25 mg, Oral, 2 times daily    ondansetron (ZOFRAN) 4 MG tablet No dose, route, or frequency recorded.    oxyCODONE (ROXICODONE) 5 mg, Oral, Every 4 hours PRN    phenazopyridine (PYRIDIUM) 100 mg, Oral, 3 times daily PRN    prochlorperazine (COMPAZINE) 10 mg, Oral, Every 6 hours PRN    tamoxifen (NOLVADEX) 20 mg, Oral, Daily    traZODone (DESYREL) 50 mg, Oral, Nightly       Review of Systems:   Answers submitted by the patient for this visit:  Answers submitted by the patient for this visit:    Review of Systems Questionnaire (Submitted on 2/27/2025)  appetite change : No  unexpected weight change: No  mouth sores: No  visual disturbance: No  cough: Yes  shortness of breath: No  chest pain: No  abdominal pain: No  diarrhea: No  frequency: No  back pain: Yes  rash: No  headaches: Yes  adenopathy: No  nervous/ anxious: No    PHYSICAL EXAM:  There were no vitals taken for this visit.     Virtual visit   ECOG 0   G.eneral: well appearing, in no apparent distress  HEENT: Normocephalic, EOMI  Lungs: no increased wob  Skin: no rash  Neurologic: Alert and oriented x 4, normal speech   Psychiatric: Conversing appropriately with providers throughout today's encounter.    Pertinent Labs & Imaging:  Reviewed recent labs, imaging and pathology.     Assessment & Plan:  Ms. Dela Cruz is a cuco 38yo woman with recently diagnosed cT2N0 HR+/Her2+ breast cancer who presents today for follow up.     Given that her tumor is as least T2N0, proceeded with neoadjuvant treatment with TCHP. She completed 6 cycles and is s/p bilateral mastectomies on 7/8/24 demonstrating pCR.    In the setting of pCR, have recommend continuing with trastuzumab only q3 weeks for the remainder of one year. Have since added tamoxifen to address HR+ component of her breast cancer which she is tolerating well thus far. She was in agreement with the above plan.     #HR+/Her2+ breast cancer:  --proceed with trastuzumab q3  weeks as scheduled. Last dose on 3/6  -- BRCA testing negative (no pathologic mutations, had to VUS in met and pten genes)  --s/p referral to integrative onc  --echo 12/18/24 w stable EF 60-65%; repeat due 3/2025  --cont tamoxifen (SOT 8/2024), goal treatment for 5 years- will resume this after she completes antibiotics in a few days   --RTC in 6 weeks with labs to check liver enzymes     #Drain infection: cultures + for staph  --improved, completed keflex    #Joint pain: likely exacerbated by tamoxifen. Improved for now   --cont toradol or ibuprofen as needed,  --referral placed to acupuncture previously   -- was taking tylenol, but stopped when liver enzymes increased    #Chemo induced neuropathy- improved  --started on gabapentin 300 mg by surgery team- taking twice daily   --continue as has improved neuropathy     #Leg swelling: improving  --cont 20 mg lasix prn - not currently needing this  --now on jardiance which has helped  --low albumin may be playing a role     #Hypoalbuminemia  --referred to nutrition previously    #Anxiety   --cont lexapro 10 mg     #Insomnia:  --trial of magnesium glycinate  --trial of taking trazodone consistently  -- will reach out to her psych team if she feels she needs stronger sleep aids    # elevated lfts  -- possibly d/t wellbutrin or recent antibiotic use  -- will repeat in 6 weeks    All questions were answered to her apparent satisfaction. Will see her back in 6 weeks or sooner should the need arise.    Route Chart for Scheduling    Med Onc Chart Routing  Urgent    Follow up with physician 6 weeks. with Dr. Blakely   Follow up with GILBERTO    Infusion scheduling note    Injection scheduling note    Labs CBC and CMP   Scheduling:  Preferred lab:  Lab interval:  in 6 weeks, day prior to Dr. Blakely visit   Imaging ECHO   in March   Pharmacy appointment    Other referrals                  Treatment Plan Information   OP BREAST TCHP (pertuzumab trastuzumab DOCEtaxel CARBOplatin) Q3W  Kassie Blakely MD   Associated diagnosis: Malignant neoplasm of upper-outer quadrant of right breast in female, estrogen receptor positive Stage IB cT2, cN0, cM0, G3, ER+, NC+, HER2+ noted on 1/17/2024   Line of treatment: First Line  Treatment Goal: Curative     Upcoming Treatment Dates - OP BREAST TCHP (pertuzumab trastuzumab DOCEtaxel CARBOplatin) Q3W    3/6/2025       Chemotherapy       trastuzumab-anns (KANJINTI) 621 mg in 0.9% NaCl 250 mL chemo infusion       Supportive Care       prochlorperazine injection 10 mg       meperidine injection 25 mg       Antiemetics       dexAMETHasone injection 20 mg       famotidine (PF) injection 20 mg       diphenhydrAMINE (BENADRYL) 50 mg in NS 50 mL IVPB    Supportive Plan Information  IV FLUIDS AND ELECTROLYTES Kassie Blakely MD   Associated Diagnosis: Dehydration   noted on 2/22/2024  Associated Diagnosis: Chemotherapy induced diarrhea   noted on 2/22/2024  Associated Diagnosis: Malignant neoplasm of upper-outer quadrant of right breast in female, estrogen receptor positive Stage IB cT2, cN0, cM0, G3, ER+, NC+, HER2+ noted on 1/17/2024   Line of treatment: Supportive Care   Treatment goal: Supportive     Upcoming Treatment Dates - IV FLUIDS AND ELECTROLYTES    No upcoming days in selected categories.      Total time of this visit, including time spent face to face with patient and/or via video/audio, and also in preparing for today's visit for MDM and documentation. (Medical Decision Making, including consideration of possible diagnoses, management options, complex medical record review, review of diagnostic tests and information, consideration and discussion of significant complications based on comorbidities, and discussion with providers involved with the care of the patient) 30 minutes. Greater than 50% was spent face to face with the patient counseling and coordinating care.    Cristina Allen NP

## 2025-03-06 ENCOUNTER — INFUSION (OUTPATIENT)
Dept: INFUSION THERAPY | Facility: HOSPITAL | Age: 40
End: 2025-03-06
Payer: COMMERCIAL

## 2025-03-06 VITALS
WEIGHT: 238.13 LBS | RESPIRATION RATE: 18 BRPM | BODY MASS INDEX: 40.65 KG/M2 | SYSTOLIC BLOOD PRESSURE: 143 MMHG | HEIGHT: 64 IN | DIASTOLIC BLOOD PRESSURE: 65 MMHG | HEART RATE: 95 BPM | TEMPERATURE: 98 F

## 2025-03-06 DIAGNOSIS — C50.411 MALIGNANT NEOPLASM OF UPPER-OUTER QUADRANT OF RIGHT BREAST IN FEMALE, ESTROGEN RECEPTOR POSITIVE: Primary | ICD-10-CM

## 2025-03-06 DIAGNOSIS — Z17.0 MALIGNANT NEOPLASM OF UPPER-OUTER QUADRANT OF RIGHT BREAST IN FEMALE, ESTROGEN RECEPTOR POSITIVE: Primary | ICD-10-CM

## 2025-03-06 PROCEDURE — 96375 TX/PRO/DX INJ NEW DRUG ADDON: CPT

## 2025-03-06 PROCEDURE — 36593 DECLOT VASCULAR DEVICE: CPT

## 2025-03-06 PROCEDURE — 63600175 PHARM REV CODE 636 W HCPCS: Performed by: NURSE PRACTITIONER

## 2025-03-06 PROCEDURE — 96367 TX/PROPH/DG ADDL SEQ IV INF: CPT

## 2025-03-06 PROCEDURE — A4216 STERILE WATER/SALINE, 10 ML: HCPCS | Performed by: NURSE PRACTITIONER

## 2025-03-06 PROCEDURE — 96409 CHEMO IV PUSH SNGL DRUG: CPT

## 2025-03-06 PROCEDURE — 25000003 PHARM REV CODE 250: Performed by: NURSE PRACTITIONER

## 2025-03-06 RX ORDER — MEPERIDINE HYDROCHLORIDE 50 MG/ML
25 INJECTION INTRAMUSCULAR; INTRAVENOUS; SUBCUTANEOUS ONCE AS NEEDED
Status: DISCONTINUED | OUTPATIENT
Start: 2025-03-06 | End: 2025-03-06 | Stop reason: HOSPADM

## 2025-03-06 RX ORDER — DEXAMETHASONE SODIUM PHOSPHATE 4 MG/ML
20 INJECTION, SOLUTION INTRA-ARTICULAR; INTRALESIONAL; INTRAMUSCULAR; INTRAVENOUS; SOFT TISSUE
Status: DISCONTINUED | OUTPATIENT
Start: 2025-03-06 | End: 2025-03-06 | Stop reason: SDUPTHER

## 2025-03-06 RX ORDER — EPINEPHRINE 0.3 MG/.3ML
0.3 INJECTION SUBCUTANEOUS ONCE AS NEEDED
Status: DISCONTINUED | OUTPATIENT
Start: 2025-03-06 | End: 2025-03-06 | Stop reason: HOSPADM

## 2025-03-06 RX ORDER — DIPHENHYDRAMINE HYDROCHLORIDE 50 MG/ML
50 INJECTION INTRAMUSCULAR; INTRAVENOUS ONCE AS NEEDED
Status: DISCONTINUED | OUTPATIENT
Start: 2025-03-06 | End: 2025-03-06 | Stop reason: HOSPADM

## 2025-03-06 RX ORDER — FAMOTIDINE 10 MG/ML
20 INJECTION INTRAVENOUS
Status: COMPLETED | OUTPATIENT
Start: 2025-03-06 | End: 2025-03-06

## 2025-03-06 RX ORDER — HEPARIN 100 UNIT/ML
500 SYRINGE INTRAVENOUS
Status: DISCONTINUED | OUTPATIENT
Start: 2025-03-06 | End: 2025-03-06 | Stop reason: HOSPADM

## 2025-03-06 RX ORDER — PROCHLORPERAZINE EDISYLATE 5 MG/ML
10 INJECTION INTRAMUSCULAR; INTRAVENOUS ONCE AS NEEDED
Status: DISCONTINUED | OUTPATIENT
Start: 2025-03-06 | End: 2025-03-06 | Stop reason: HOSPADM

## 2025-03-06 RX ORDER — SODIUM CHLORIDE 0.9 % (FLUSH) 0.9 %
10 SYRINGE (ML) INJECTION
Status: DISCONTINUED | OUTPATIENT
Start: 2025-03-06 | End: 2025-03-06 | Stop reason: HOSPADM

## 2025-03-06 RX ADMIN — FAMOTIDINE 20 MG: 10 INJECTION INTRAVENOUS at 09:03

## 2025-03-06 RX ADMIN — ALTEPLASE 2 MG: 2.2 INJECTION, POWDER, LYOPHILIZED, FOR SOLUTION INTRAVENOUS at 09:03

## 2025-03-06 RX ADMIN — TRASTUZUMAB-ANNS 600 MG: 420 INJECTION, POWDER, LYOPHILIZED, FOR SOLUTION INTRAVENOUS at 10:03

## 2025-03-06 RX ADMIN — DIPHENHYDRAMINE HYDROCHLORIDE 50 MG: 50 INJECTION INTRAMUSCULAR; INTRAVENOUS at 09:03

## 2025-03-06 RX ADMIN — Medication 10 ML: at 11:03

## 2025-03-06 RX ADMIN — DEXAMETHASONE SODIUM PHOSPHATE 20 MG: 4 INJECTION, SOLUTION INTRA-ARTICULAR; INTRALESIONAL; INTRAMUSCULAR; INTRAVENOUS; SOFT TISSUE at 09:03

## 2025-03-06 RX ADMIN — HEPARIN 500 UNITS: 100 SYRINGE at 11:03

## 2025-03-06 NOTE — NURSING
Port was declotted this am.    Azithromycin Pregnancy And Lactation Text: This medication is considered safe during pregnancy and is also secreted in breast milk.

## 2025-03-07 ENCOUNTER — PATIENT MESSAGE (OUTPATIENT)
Dept: HEMATOLOGY/ONCOLOGY | Facility: CLINIC | Age: 40
End: 2025-03-07
Payer: COMMERCIAL

## 2025-03-10 ENCOUNTER — TELEPHONE (OUTPATIENT)
Dept: INFUSION THERAPY | Facility: HOSPITAL | Age: 40
End: 2025-03-10
Payer: COMMERCIAL

## 2025-03-10 DIAGNOSIS — Z17.0 MALIGNANT NEOPLASM OF UPPER-OUTER QUADRANT OF RIGHT BREAST IN FEMALE, ESTROGEN RECEPTOR POSITIVE: Primary | ICD-10-CM

## 2025-03-10 DIAGNOSIS — C50.411 MALIGNANT NEOPLASM OF UPPER-OUTER QUADRANT OF RIGHT BREAST IN FEMALE, ESTROGEN RECEPTOR POSITIVE: Primary | ICD-10-CM

## 2025-03-11 ENCOUNTER — TELEPHONE (OUTPATIENT)
Dept: INTERVENTIONAL RADIOLOGY/VASCULAR | Facility: CLINIC | Age: 40
End: 2025-03-11
Payer: COMMERCIAL

## 2025-03-11 DIAGNOSIS — C50.411 MALIGNANT NEOPLASM OF UPPER-OUTER QUADRANT OF RIGHT BREAST IN FEMALE, ESTROGEN RECEPTOR POSITIVE: Primary | ICD-10-CM

## 2025-03-11 DIAGNOSIS — Z17.0 MALIGNANT NEOPLASM OF UPPER-OUTER QUADRANT OF RIGHT BREAST IN FEMALE, ESTROGEN RECEPTOR POSITIVE: Primary | ICD-10-CM

## 2025-03-17 ENCOUNTER — TELEPHONE (OUTPATIENT)
Dept: INTERVENTIONAL RADIOLOGY/VASCULAR | Facility: HOSPITAL | Age: 40
End: 2025-03-17
Payer: COMMERCIAL

## 2025-03-17 NOTE — NURSING
Pre-procedure call complete.  2 patient identifier used (name and ).   Arrival time 7:30.      No food after midnight.  You may drink clear liquids (sports drinks, clear juices) until 2 hours before arrival time.  Clear liquids include only water, black coffee (no creamer), clear oral rehydration drinks, clear sports drinks and clear fruit juices.  Clear liquids do NOT include anything with pulp or food particles (chicken broth, ice cream, yogurt, jello, etc).  NO orange juice, pulpy juices, or apple cider.  If you can read newsprint through the liquid, it qualifies as clear.  IF UNSURE, drink water instead.      Have NOTHING BY MOUTH 2 hours before arrival time.  Medication the morning of your procedure may be taken with a sip of water.    Patient aware will need someone to provide transport home and monitor pt 8 hours post procedure.  No driving for at least 24 hours after procedure.   Patient advised to take blood pressure and heart medications with a sip of water morning of procedure.  Patient verbalized aware of which medications to take.  Do not take sleep medication (including OTC) the night before procedure.  Arrival time and location given.  Expected length of stay reviewed.  Covid screening completed.  Patient verbalized understanding of all pre-procedure instructions.  Written instructions and directions sent to patient in [employer]/portal.

## 2025-03-18 ENCOUNTER — LAB VISIT (OUTPATIENT)
Dept: LAB | Facility: HOSPITAL | Age: 40
End: 2025-03-18
Payer: COMMERCIAL

## 2025-03-18 DIAGNOSIS — Z17.0 MALIGNANT NEOPLASM OF UPPER-OUTER QUADRANT OF RIGHT BREAST IN FEMALE, ESTROGEN RECEPTOR POSITIVE: ICD-10-CM

## 2025-03-18 DIAGNOSIS — C50.411 MALIGNANT NEOPLASM OF UPPER-OUTER QUADRANT OF RIGHT BREAST IN FEMALE, ESTROGEN RECEPTOR POSITIVE: ICD-10-CM

## 2025-03-18 LAB
BASOPHILS # BLD AUTO: 0.02 K/UL (ref 0–0.2)
BASOPHILS NFR BLD: 0.2 % (ref 0–1.9)
DIFFERENTIAL METHOD BLD: ABNORMAL
EOSINOPHIL # BLD AUTO: 0.1 K/UL (ref 0–0.5)
EOSINOPHIL NFR BLD: 1.2 % (ref 0–8)
ERYTHROCYTE [DISTWIDTH] IN BLOOD BY AUTOMATED COUNT: 16.8 % (ref 11.5–14.5)
HCT VFR BLD AUTO: 39.4 % (ref 37–48.5)
HGB BLD-MCNC: 11.9 G/DL (ref 12–16)
IMM GRANULOCYTES # BLD AUTO: 0.04 K/UL (ref 0–0.04)
IMM GRANULOCYTES NFR BLD AUTO: 0.4 % (ref 0–0.5)
INR PPP: 1 (ref 0.8–1.2)
LYMPHOCYTES # BLD AUTO: 3.2 K/UL (ref 1–4.8)
LYMPHOCYTES NFR BLD: 34.5 % (ref 18–48)
MCH RBC QN AUTO: 24.2 PG (ref 27–31)
MCHC RBC AUTO-ENTMCNC: 30.2 G/DL (ref 32–36)
MCV RBC AUTO: 80 FL (ref 82–98)
MONOCYTES # BLD AUTO: 1.1 K/UL (ref 0.3–1)
MONOCYTES NFR BLD: 11.4 % (ref 4–15)
NEUTROPHILS # BLD AUTO: 4.8 K/UL (ref 1.8–7.7)
NEUTROPHILS NFR BLD: 52.3 % (ref 38–73)
NRBC BLD-RTO: 0 /100 WBC
PLATELET # BLD AUTO: 224 K/UL (ref 150–450)
PMV BLD AUTO: 10.6 FL (ref 9.2–12.9)
PROTHROMBIN TIME: 11.2 SEC (ref 9–12.5)
RBC # BLD AUTO: 4.91 M/UL (ref 4–5.4)
WBC # BLD AUTO: 9.19 K/UL (ref 3.9–12.7)

## 2025-03-18 PROCEDURE — 36415 COLL VENOUS BLD VENIPUNCTURE: CPT | Mod: PN

## 2025-03-18 PROCEDURE — 85610 PROTHROMBIN TIME: CPT

## 2025-03-18 PROCEDURE — 85025 COMPLETE CBC W/AUTO DIFF WBC: CPT

## 2025-03-20 ENCOUNTER — HOSPITAL ENCOUNTER (OUTPATIENT)
Dept: INTERVENTIONAL RADIOLOGY/VASCULAR | Facility: HOSPITAL | Age: 40
Discharge: HOME | End: 2025-03-20
Payer: COMMERCIAL

## 2025-03-20 VITALS
HEART RATE: 82 BPM | WEIGHT: 245 LBS | OXYGEN SATURATION: 96 % | SYSTOLIC BLOOD PRESSURE: 113 MMHG | DIASTOLIC BLOOD PRESSURE: 60 MMHG | TEMPERATURE: 98 F | BODY MASS INDEX: 42.05 KG/M2 | RESPIRATION RATE: 23 BRPM

## 2025-03-20 DIAGNOSIS — Z17.0 MALIGNANT NEOPLASM OF UPPER-OUTER QUADRANT OF RIGHT BREAST IN FEMALE, ESTROGEN RECEPTOR POSITIVE: Primary | ICD-10-CM

## 2025-03-20 DIAGNOSIS — C50.411 MALIGNANT NEOPLASM OF UPPER-OUTER QUADRANT OF RIGHT BREAST IN FEMALE, ESTROGEN RECEPTOR POSITIVE: Primary | ICD-10-CM

## 2025-03-20 LAB
B-HCG UR QL: NEGATIVE
CTP QC/QA: YES

## 2025-03-20 PROCEDURE — 63600175 PHARM REV CODE 636 W HCPCS: Performed by: RADIOLOGY

## 2025-03-20 PROCEDURE — 81025 URINE PREGNANCY TEST: CPT

## 2025-03-20 PROCEDURE — 94760 N-INVAS EAR/PLS OXIMETRY 1: CPT

## 2025-03-20 PROCEDURE — 99900035 HC TECH TIME PER 15 MIN (STAT)

## 2025-03-20 RX ORDER — PANTOPRAZOLE SODIUM 20 MG/1
20 TABLET, DELAYED RELEASE ORAL 2 TIMES DAILY
COMMUNITY

## 2025-03-20 RX ORDER — LEVOCETIRIZINE DIHYDROCHLORIDE 5 MG/1
5 TABLET, FILM COATED ORAL NIGHTLY
COMMUNITY

## 2025-03-20 RX ORDER — SODIUM CHLORIDE 9 MG/ML
INJECTION, SOLUTION INTRAVENOUS CONTINUOUS
Status: DISCONTINUED | OUTPATIENT
Start: 2025-03-20 | End: 2025-03-21 | Stop reason: HOSPADM

## 2025-03-20 RX ORDER — FENTANYL CITRATE 50 UG/ML
INJECTION, SOLUTION INTRAMUSCULAR; INTRAVENOUS
Status: COMPLETED | OUTPATIENT
Start: 2025-03-20 | End: 2025-03-20

## 2025-03-20 RX ORDER — MIDAZOLAM HYDROCHLORIDE 1 MG/ML
INJECTION, SOLUTION INTRAMUSCULAR; INTRAVENOUS
Status: COMPLETED | OUTPATIENT
Start: 2025-03-20 | End: 2025-03-20

## 2025-03-20 RX ORDER — LIDOCAINE HYDROCHLORIDE 10 MG/ML
1 INJECTION, SOLUTION EPIDURAL; INFILTRATION; INTRACAUDAL; PERINEURAL ONCE
Status: DISCONTINUED | OUTPATIENT
Start: 2025-03-20 | End: 2025-03-21 | Stop reason: HOSPADM

## 2025-03-20 RX ORDER — EMPAGLIFLOZIN 10 MG/1
10 TABLET, FILM COATED ORAL DAILY
COMMUNITY
Start: 2024-10-28

## 2025-03-20 RX ORDER — ONDANSETRON HYDROCHLORIDE 2 MG/ML
4 INJECTION, SOLUTION INTRAVENOUS EVERY 6 HOURS PRN
Status: DISCONTINUED | OUTPATIENT
Start: 2025-03-20 | End: 2025-03-21 | Stop reason: HOSPADM

## 2025-03-20 RX ADMIN — FENTANYL CITRATE 100 MCG: 50 INJECTION, SOLUTION INTRAMUSCULAR; INTRAVENOUS at 09:03

## 2025-03-20 RX ADMIN — MIDAZOLAM HYDROCHLORIDE 2 MG: 1 INJECTION, SOLUTION INTRAMUSCULAR; INTRAVENOUS at 09:03

## 2025-03-20 NOTE — DISCHARGE INSTRUCTIONS
Port Removal Discharge Instructions    You have had a port removed, and unless otherwise instructed no further IR follow up is needed and you should follow up with the doctor who ordered the port.     Special Instructions      Wound care: The dressing over your port site may be removed after 3-4  days. After removing the dressing, gently wash area daily with mild soap  and water. Pat dry. Cover with sterile dressing or band aid for 7-10 days  or until site is healed.   You can use a large pad-style bandage, or a 4x4 gauze dressing and tape to cover the site if your clothing (or bra straps) rubbing against it is uncomfortable to you. These items can be found at your local pharmacy or grocery/shopping center.       If I have skin adhesive how do I care for it?     You may have a type a skin adhesive (glue) instead of a bandage. Do not  bandage a wound treated with a skin adhesive. The skin adhesive works like a  bandage.   Do not use antibiotic ointment as it can break down the adhesive.   You can shower while the adhesive is on your skin (same day is ok), but do  not take a bath or soak or scrub the area for 14 days. Dry your skin by  patting it gently with a towel.   The adhesive will peel off on its own, usually in 5 to 10 days. o If it has been  10 days and you still have adhesive on you, you can use antibiotic ointment  or petroleum jelly to gently rub it off.       What activities can I perform?   If you have been given sedation no driving for 24 hours   Avoid lifting over 10 pounds for 2 days    Avoid heavy lifting or strenuous upper body activities for 5 days          Hand hygiene is VERY important! Anyone, including you, who touches the port site should do so with CLEAN hands.             [facility]   [phone], choose prompt 3, Monday - Friday, 8:00 am - 4:00 pm   [phone] After hours and on holidays. Ask to speak with the interventional radiologist on call.

## 2025-03-20 NOTE — PROCEDURES
Radiology Post-Procedure Note    Pre Op Diagnosis: Breast cancer    Post Op Diagnosis: Same    Procedure: Left chest port removal    Procedure performed by: Marc Barnett MD    Written Informed Consent Obtained: Yes  Specimen Removed: YES Left chest port  Estimated Blood Loss: Minimal    Findings:   Left chest port removed without complications. See dictation.    Patient tolerated procedure well.    Marc Barnett M.D.  Interventional Radiology  Department of Radiology

## 2025-03-20 NOTE — PLAN OF CARE
Pt in preop bay 29, VSS and IV inserted. Pt denies any open wounds on body or the use of any weight loss injections. Pt ready to roll.

## 2025-03-20 NOTE — DISCHARGE SUMMARY
Radiology Discharge Summary      Hospital Course: No complications    Admit Date: 3/20/2025  Discharge Date: 03/20/2025     Instructions Given to Patient: Yes  Diet: Resume prior diet  Activity: activity as tolerated and no driving for today    Description of Condition on Discharge: Stable  Vital Signs (Most Recent): Temp: 97.5 °F (36.4 °C) (03/20/25 0814)  Pulse: 77 (03/20/25 0814)  Resp: 16 (03/20/25 0814)  BP: (!) 126/59 (03/20/25 0814)  SpO2: 97 % (03/20/25 0814)    Discharge Disposition: Home    Discharge Diagnosis: Breast cancer s/p left chest port removal    Follow up: As scheduled    Marc Barnett M.D.  Interventional Radiology  Department of Radiology

## 2025-03-20 NOTE — PLAN OF CARE
Discharge instructions given and explained to patient with verbalization of understanding all instructions.  Patients v/s stable, denies n/v and tolerating po, rates pain level tolerable, IV removed, catheter tip intact. Escorted patient via wheelchair to family member awaiting in private vehicle.

## 2025-03-20 NOTE — H&P
Radiology History & Physical      SUBJECTIVE:     Chief Complaint: Breast cancer    History of Present Illness:  Madhu Dela Cruz is a 39 y.o. female who presents for left chest port removal.  Past Medical History:   Diagnosis Date    Esophageal reflux     Migraine      No past surgical history on file.    Home Meds:   Prior to Admission medications    Medication Sig Start Date End Date Taking? Authorizing Provider   duke's soln (benadryl 30 mL, mylanta 30 mL, LIDOcaine 30 mL, nystatin 30 mL) 120mL Take 10 mLs by mouth 4 (four) times daily. Swish and swallow 2/22/24   Cristina Allen NP   ergocalciferol, vitamin D2, (VITAMIN D ORAL)     Provider, Historical   EScitalopram oxalate (LEXAPRO) 5 MG Tab Take 1 tablet (5 mg total) by mouth once daily. 8/30/24 8/30/25  Paulina Rivera NP   gabapentin (NEURONTIN) 300 MG capsule Take 1 capsule (300 mg total) by mouth 2 (two) times daily. 9/9/24 9/9/25  Paulina Rivera NP   LACTOBACILLUS ACIDOPHILUS ORAL     Provider, Historical   LIDOcaine-prilocaine (EMLA) cream Apply topically as needed (apply 30-60 minutes prior to chemotherapy). 1/24/24   Paulina Rivera NP   metoprolol tartrate (LOPRESSOR) 25 MG tablet Take 1 tablet (25 mg total) by mouth 2 (two) times daily. 4/5/24 4/5/25  Paulina Rivera NP   ondansetron (ZOFRAN) 4 MG tablet  12/13/17   Provider, Historical   oxyCODONE (ROXICODONE) 5 MG immediate release tablet Take 1 tablet (5 mg total) by mouth every 4 (four) hours as needed for Pain. 9/19/24   Kassie Blakely MD   phenazopyridine (PYRIDIUM) 100 MG tablet Take 1 tablet (100 mg total) by mouth 3 (three) times daily as needed for Pain. 3/1/24   Cristina Allen NP   prochlorperazine (COMPAZINE) 5 MG tablet Take 2 tablets (10 mg total) by mouth every 6 (six) hours as needed for Nausea. 5/30/24   [place], Paulina S., NP   tamoxifen (NOLVADEX) 20 MG Tab Take 1 tablet (20 mg total) by mouth once daily. 9/4/24   Paulina Rivera  NP   traZODone (DESYREL) 50 MG tablet Take 1 tablet (50 mg total) by mouth every evening. 1/23/25 1/23/26  Ksasie Blakely MD     Anticoagulants/Antiplatelets: no anticoagulation    Allergies:   Review of patient's allergies indicates:   Allergen Reactions    Pertuzumab      Rigors during Kanjinti infusion (after pertuzumab). Resolved with meperidine and ondansetron.    Trastuzumab      Rigors during Kanjinti infusion. Resolved with meperidine and ondansetron.      Sedation History:  no adverse reactions    Review of Systems:   Hematological: no known coagulopathies  Respiratory: no shortness of breath  Cardiovascular: no chest pain  Gastrointestinal: no abdominal pain  Genito-Urinary: no dysuria  Musculoskeletal: negative  Neurological: no TIA or stroke symptoms         OBJECTIVE:     Vital Signs (Most Recent)       Physical Exam:  ASA: 3  Mallampati: 2    General: no acute distress  Mental Status: alert and oriented to person, place and time  HEENT: normocephalic, atraumatic  Chest: unlabored breathing  Heart: regular heart rate  Abdomen: nondistended  Extremity: moves all extremities    Laboratory  Lab Results   Component Value Date    INR 1.0 03/18/2025       Lab Results   Component Value Date    WBC 9.19 03/18/2025    HGB 11.9 (L) 03/18/2025    HCT 39.4 03/18/2025    MCV 80 (L) 03/18/2025     03/18/2025      Lab Results   Component Value Date    GLU 99 02/27/2025     02/27/2025    K 4.0 02/27/2025     02/27/2025    CO2 24 02/27/2025    BUN 13 02/27/2025    CREATININE 0.8 02/27/2025    CALCIUM 8.6 (L) 02/27/2025    MG 1.6 02/23/2024    ALT 55 (H) 02/27/2025    AST 70 (H) 02/27/2025    ALBUMIN 3.0 (L) 02/27/2025    BILITOT 0.4 02/27/2025       ASSESSMENT/PLAN:     Sedation Plan: Moderate  Patient will undergo left chest port removal.    Marc Barnett M.D.  Interventional Radiology  Department of Radiology

## 2025-03-21 ENCOUNTER — PATIENT MESSAGE (OUTPATIENT)
Dept: HEMATOLOGY/ONCOLOGY | Facility: CLINIC | Age: 40
End: 2025-03-21
Payer: COMMERCIAL

## 2025-03-25 ENCOUNTER — LAB VISIT (OUTPATIENT)
Dept: LAB | Facility: HOSPITAL | Age: 40
End: 2025-03-25
Attending: STUDENT IN AN ORGANIZED HEALTH CARE EDUCATION/TRAINING PROGRAM
Payer: COMMERCIAL

## 2025-03-25 ENCOUNTER — PATIENT MESSAGE (OUTPATIENT)
Dept: HEMATOLOGY/ONCOLOGY | Facility: CLINIC | Age: 40
End: 2025-03-25
Payer: COMMERCIAL

## 2025-03-25 DIAGNOSIS — C50.411 MALIGNANT NEOPLASM OF UPPER-OUTER QUADRANT OF RIGHT BREAST IN FEMALE, ESTROGEN RECEPTOR POSITIVE: ICD-10-CM

## 2025-03-25 DIAGNOSIS — Z17.0 MALIGNANT NEOPLASM OF UPPER-OUTER QUADRANT OF RIGHT BREAST IN FEMALE, ESTROGEN RECEPTOR POSITIVE: ICD-10-CM

## 2025-03-25 LAB
ABSOLUTE NEUTROPHIL COUNT (OHS): 3.23 K/UL (ref 1.8–7.7)
ALBUMIN SERPL BCP-MCNC: 3 G/DL (ref 3.5–5.2)
ALP SERPL-CCNC: 100 UNIT/L (ref 40–150)
ALT SERPL W/O P-5'-P-CCNC: 59 UNIT/L (ref 10–44)
ANION GAP (OHS): 7 MMOL/L (ref 8–16)
AST SERPL-CCNC: 71 UNIT/L (ref 11–45)
BILIRUB SERPL-MCNC: 0.4 MG/DL (ref 0.1–1)
BUN SERPL-MCNC: 12 MG/DL (ref 6–20)
CALCIUM SERPL-MCNC: 8.4 MG/DL (ref 8.7–10.5)
CHLORIDE SERPL-SCNC: 107 MMOL/L (ref 95–110)
CO2 SERPL-SCNC: 23 MMOL/L (ref 23–29)
CREAT SERPL-MCNC: 0.7 MG/DL (ref 0.5–1.4)
ERYTHROCYTE [DISTWIDTH] IN BLOOD BY AUTOMATED COUNT: 17.9 % (ref 11.5–14.5)
GFR SERPLBLD CREATININE-BSD FMLA CKD-EPI: >60 ML/MIN/1.73/M2
GLUCOSE SERPL-MCNC: 86 MG/DL (ref 70–110)
HCT VFR BLD AUTO: 37.5 % (ref 37–48.5)
HGB BLD-MCNC: 11.4 GM/DL (ref 12–16)
IMM GRANULOCYTES # BLD AUTO: 0.02 K/UL (ref 0–0.04)
MCH RBC QN AUTO: 24.5 PG (ref 27–50)
MCHC RBC AUTO-ENTMCNC: 30.4 G/DL (ref 32–36)
MCV RBC AUTO: 81 FL (ref 82–98)
PLATELET # BLD AUTO: 254 K/UL (ref 150–450)
PMV BLD AUTO: 10.1 FL (ref 9.2–12.9)
POTASSIUM SERPL-SCNC: 3.9 MMOL/L (ref 3.5–5.1)
PROT SERPL-MCNC: 6.6 GM/DL (ref 6–8.4)
RBC # BLD AUTO: 4.66 M/UL (ref 4–5.4)
SODIUM SERPL-SCNC: 137 MMOL/L (ref 136–145)
WBC # BLD AUTO: 6.7 K/UL (ref 3.9–12.7)

## 2025-03-25 PROCEDURE — 36415 COLL VENOUS BLD VENIPUNCTURE: CPT | Mod: PN

## 2025-03-25 PROCEDURE — 80053 COMPREHEN METABOLIC PANEL: CPT

## 2025-03-25 PROCEDURE — 85027 COMPLETE CBC AUTOMATED: CPT

## 2025-03-28 DIAGNOSIS — R79.89 ELEVATED LFTS: Primary | ICD-10-CM

## 2025-04-01 DIAGNOSIS — Z17.0 MALIGNANT NEOPLASM OF UPPER-OUTER QUADRANT OF RIGHT BREAST IN FEMALE, ESTROGEN RECEPTOR POSITIVE: Primary | ICD-10-CM

## 2025-04-01 DIAGNOSIS — C50.411 MALIGNANT NEOPLASM OF UPPER-OUTER QUADRANT OF RIGHT BREAST IN FEMALE, ESTROGEN RECEPTOR POSITIVE: Primary | ICD-10-CM

## 2025-04-03 ENCOUNTER — HOSPITAL ENCOUNTER (OUTPATIENT)
Dept: CARDIOLOGY | Facility: OTHER | Age: 40
Discharge: HOME | End: 2025-04-03
Attending: STUDENT IN AN ORGANIZED HEALTH CARE EDUCATION/TRAINING PROGRAM
Payer: COMMERCIAL

## 2025-04-03 ENCOUNTER — HOSPITAL ENCOUNTER (OUTPATIENT)
Dept: RADIOLOGY | Facility: OTHER | Age: 40
Discharge: HOME | End: 2025-04-03
Attending: STUDENT IN AN ORGANIZED HEALTH CARE EDUCATION/TRAINING PROGRAM
Payer: COMMERCIAL

## 2025-04-03 VITALS
WEIGHT: 245 LBS | SYSTOLIC BLOOD PRESSURE: 140 MMHG | HEIGHT: 64 IN | BODY MASS INDEX: 41.83 KG/M2 | DIASTOLIC BLOOD PRESSURE: 76 MMHG

## 2025-04-03 DIAGNOSIS — Z17.0 MALIGNANT NEOPLASM OF UPPER-OUTER QUADRANT OF RIGHT BREAST IN FEMALE, ESTROGEN RECEPTOR POSITIVE: ICD-10-CM

## 2025-04-03 DIAGNOSIS — C50.411 MALIGNANT NEOPLASM OF UPPER-OUTER QUADRANT OF RIGHT BREAST IN FEMALE, ESTROGEN RECEPTOR POSITIVE: ICD-10-CM

## 2025-04-03 DIAGNOSIS — R79.89 ELEVATED LFTS: ICD-10-CM

## 2025-04-03 PROCEDURE — 76705 ECHO EXAM OF ABDOMEN: CPT | Mod: TC

## 2025-04-03 PROCEDURE — 93306 TTE W/DOPPLER COMPLETE: CPT | Mod: 26,,, | Performed by: INTERNAL MEDICINE

## 2025-04-03 PROCEDURE — 93356 MYOCRD STRAIN IMG SPCKL TRCK: CPT | Mod: ,,, | Performed by: INTERNAL MEDICINE

## 2025-04-03 PROCEDURE — 93306 TTE W/DOPPLER COMPLETE: CPT

## 2025-04-03 PROCEDURE — 76705 ECHO EXAM OF ABDOMEN: CPT | Mod: 26,,, | Performed by: RADIOLOGY

## 2025-04-04 DIAGNOSIS — L08.9 SKIN INFECTION: Primary | ICD-10-CM

## 2025-04-04 LAB
AORTIC ROOT ANNULUS: 2.49 CM
ASCENDING AORTA: 2.7 CM
AV INDEX (PROSTH): 0.75
AV MEAN GRADIENT: 6 MMHG
AV PEAK GRADIENT: 13 MMHG
AV VALVE AREA BY VELOCITY RATIO: 2.3 CM²
AV VALVE AREA: 2.6 CM²
AV VELOCITY RATIO: 0.67
BSA FOR ECHO PROCEDURE: 2.24 M2
CV ECHO LV RWT: 0.38 CM
DOP CALC AO PEAK VEL: 1.8 M/S
DOP CALC AO VTI: 36.7 CM
DOP CALC LVOT AREA: 3.5 CM2
DOP CALC LVOT DIAMETER: 2.1 CM
DOP CALC LVOT PEAK VEL: 1.2 M/S
DOP CALC LVOT STROKE VOLUME: 95.2 CM3
DOP CALCLVOT PEAK VEL VTI: 27.5 CM
E WAVE DECELERATION TIME: 209 MSEC
E/A RATIO: 1
E/E' RATIO: 6 M/S
ECHO LV POSTERIOR WALL: 0.8 CM (ref 0.6–1.1)
FRACTIONAL SHORTENING: 52.4 % (ref 28–44)
GLOBAL LONGITUIDAL STRAIN: 18.1 %
INTERVENTRICULAR SEPTUM: 0.9 CM (ref 0.6–1.1)
IVC DIAMETER: 1.31 CM
LA MAJOR: 6.3 CM
LA MINOR: 5.7 CM
LA WIDTH: 4.1 CM
LAAS-AP2: 19 CM2
LAAS-AP4: 20 CM2
LEFT ATRIUM AREA SYSTOLIC (APICAL 2 CHAMBER): 18.4 CM2
LEFT ATRIUM AREA SYSTOLIC (APICAL 4 CHAMBER): 20.98 CM2
LEFT ATRIUM SIZE: 2.9 CM
LEFT ATRIUM VOLUME INDEX MOD: 23 ML/M2
LEFT ATRIUM VOLUME INDEX: 28 ML/M2
LEFT ATRIUM VOLUME MOD: 50 ML
LEFT ATRIUM VOLUME: 60 CM3
LEFT INTERNAL DIMENSION IN SYSTOLE: 2 CM (ref 2.1–4)
LEFT VENTRICLE DIASTOLIC VOLUME INDEX: 37.56 ML/M2
LEFT VENTRICLE DIASTOLIC VOLUME: 80 ML
LEFT VENTRICLE END SYSTOLIC VOLUME APICAL 2 CHAMBER: 45.44 ML
LEFT VENTRICLE END SYSTOLIC VOLUME APICAL 4 CHAMBER: 52.98 ML
LEFT VENTRICLE MASS INDEX: 51.6 G/M2
LEFT VENTRICLE SYSTOLIC VOLUME INDEX: 6.1 ML/M2
LEFT VENTRICLE SYSTOLIC VOLUME: 13 ML
LEFT VENTRICULAR INTERNAL DIMENSION IN DIASTOLE: 4.2 CM (ref 3.5–6)
LEFT VENTRICULAR MASS: 109.8 G
LV LATERAL E/E' RATIO: 5.7 M/S
LV SEPTAL E/E' RATIO: 6.7 M/S
LVED V (TEICH): 80.12 ML
LVES V (TEICH): 12.52 ML
LVOT MG: 3.28 MMHG
LVOT MV: 0.85 CM/S
MV PEAK A VEL: 0.8 M/S
MV PEAK E VEL: 0.8 M/S
MV STENOSIS PRESSURE HALF TIME: 60.65 MS
MV VALVE AREA P 1/2 METHOD: 3.63 CM2
OHS CV RV/LV RATIO: 0.36 CM
PISA TR MAX VEL: 2 M/S
PV MV: 0.67 M/S
PV PEAK GRADIENT: 4 MMHG
PV PEAK VELOCITY: 0.97 M/S
RA MAJOR: 4.8 CM
RA PRESSURE ESTIMATED: 3 MMHG
RA WIDTH: 1.9 CM
RIGHT VENTRICLE DIASTOLIC BASEL DIMENSION: 1.5 CM
RIGHT VENTRICULAR END-DIASTOLIC DIMENSION: 1.45 CM
RV TB RVSP: 5 MMHG
RV TISSUE DOPPLER FREE WALL SYSTOLIC VELOCITY 1 (APICAL 4 CHAMBER VIEW): 11.79 CM/S
SINUS: 2.4 CM
STJ: 2.7 CM
TDI LATERAL: 0.14 M/S
TDI SEPTAL: 0.12 M/S
TDI: 0.13 M/S
TR MAX PG: 16 MMHG
TRICUSPID ANNULAR PLANE SYSTOLIC EXCURSION: 1.6 CM
TV REST PULMONARY ARTERY PRESSURE: 19 MMHG
Z-SCORE OF LEFT VENTRICULAR DIMENSION IN END DIASTOLE: -4.8
Z-SCORE OF LEFT VENTRICULAR DIMENSION IN END SYSTOLE: -5.75

## 2025-04-04 RX ORDER — MUPIROCIN 20 MG/G
OINTMENT TOPICAL 3 TIMES DAILY
Qty: 22 G | Refills: 0 | Status: SHIPPED | OUTPATIENT
Start: 2025-04-04

## 2025-04-16 ENCOUNTER — PATIENT MESSAGE (OUTPATIENT)
Dept: HEMATOLOGY/ONCOLOGY | Facility: CLINIC | Age: 40
End: 2025-04-16
Payer: COMMERCIAL

## 2025-04-16 DIAGNOSIS — R11.0 CHEMOTHERAPY-INDUCED NAUSEA: Primary | ICD-10-CM

## 2025-04-16 DIAGNOSIS — T45.1X5A CHEMOTHERAPY-INDUCED NAUSEA: Primary | ICD-10-CM

## 2025-04-16 RX ORDER — ONDANSETRON HYDROCHLORIDE 8 MG/1
8 TABLET, FILM COATED ORAL EVERY 6 HOURS PRN
Qty: 30 TABLET | Refills: 1 | Status: SHIPPED | OUTPATIENT
Start: 2025-04-16

## 2025-04-23 ENCOUNTER — OFFICE VISIT (OUTPATIENT)
Dept: HEMATOLOGY/ONCOLOGY | Facility: CLINIC | Age: 40
End: 2025-04-23
Payer: COMMERCIAL

## 2025-04-23 ENCOUNTER — LAB VISIT (OUTPATIENT)
Dept: LAB | Facility: HOSPITAL | Age: 40
End: 2025-04-23
Attending: STUDENT IN AN ORGANIZED HEALTH CARE EDUCATION/TRAINING PROGRAM
Payer: COMMERCIAL

## 2025-04-23 ENCOUNTER — TELEPHONE (OUTPATIENT)
Dept: PSYCHIATRY | Facility: CLINIC | Age: 40
End: 2025-04-23
Payer: COMMERCIAL

## 2025-04-23 VITALS
BODY MASS INDEX: 42.48 KG/M2 | TEMPERATURE: 98 F | WEIGHT: 248.81 LBS | HEIGHT: 64 IN | HEART RATE: 85 BPM | RESPIRATION RATE: 20 BRPM | SYSTOLIC BLOOD PRESSURE: 111 MMHG | OXYGEN SATURATION: 95 % | DIASTOLIC BLOOD PRESSURE: 72 MMHG

## 2025-04-23 DIAGNOSIS — T45.1X5A CHEMOTHERAPY-INDUCED NEUROPATHY: ICD-10-CM

## 2025-04-23 DIAGNOSIS — Z17.0 MALIGNANT NEOPLASM OF UPPER-OUTER QUADRANT OF RIGHT BREAST IN FEMALE, ESTROGEN RECEPTOR POSITIVE: ICD-10-CM

## 2025-04-23 DIAGNOSIS — R74.01 TRANSAMINITIS: ICD-10-CM

## 2025-04-23 DIAGNOSIS — M25.50 ARTHRALGIA, UNSPECIFIED JOINT: ICD-10-CM

## 2025-04-23 DIAGNOSIS — C50.411 MALIGNANT NEOPLASM OF UPPER-OUTER QUADRANT OF RIGHT BREAST IN FEMALE, ESTROGEN RECEPTOR POSITIVE: ICD-10-CM

## 2025-04-23 DIAGNOSIS — G62.0 CHEMOTHERAPY-INDUCED NEUROPATHY: ICD-10-CM

## 2025-04-23 DIAGNOSIS — Z17.0 MALIGNANT NEOPLASM OF UPPER-OUTER QUADRANT OF RIGHT BREAST IN FEMALE, ESTROGEN RECEPTOR POSITIVE: Primary | ICD-10-CM

## 2025-04-23 DIAGNOSIS — G47.00 INSOMNIA, UNSPECIFIED TYPE: ICD-10-CM

## 2025-04-23 DIAGNOSIS — C50.411 MALIGNANT NEOPLASM OF UPPER-OUTER QUADRANT OF RIGHT BREAST IN FEMALE, ESTROGEN RECEPTOR POSITIVE: Primary | ICD-10-CM

## 2025-04-23 DIAGNOSIS — E88.09 HYPOALBUMINEMIA: ICD-10-CM

## 2025-04-23 DIAGNOSIS — R45.89 ANXIETY ABOUT MASTECTOMY: ICD-10-CM

## 2025-04-23 DIAGNOSIS — T45.1X5A PERIPHERAL NEUROPATHY DUE TO CHEMOTHERAPY: ICD-10-CM

## 2025-04-23 DIAGNOSIS — G62.0 PERIPHERAL NEUROPATHY DUE TO CHEMOTHERAPY: ICD-10-CM

## 2025-04-23 LAB
ABSOLUTE EOSINOPHIL (OHS): 0.17 K/UL
ABSOLUTE MONOCYTE (OHS): 0.81 K/UL (ref 0.3–1)
ABSOLUTE NEUTROPHIL COUNT (OHS): 4.52 K/UL (ref 1.8–7.7)
ALBUMIN SERPL BCP-MCNC: 3 G/DL (ref 3.5–5.2)
ALP SERPL-CCNC: 112 UNIT/L (ref 40–150)
ALT SERPL W/O P-5'-P-CCNC: 76 UNIT/L (ref 10–44)
ANION GAP (OHS): 8 MMOL/L (ref 8–16)
AST SERPL-CCNC: 75 UNIT/L (ref 11–45)
BASOPHILS # BLD AUTO: 0.01 K/UL
BASOPHILS NFR BLD AUTO: 0.1 %
BILIRUB SERPL-MCNC: 0.3 MG/DL (ref 0.1–1)
BUN SERPL-MCNC: 10 MG/DL (ref 6–20)
CALCIUM SERPL-MCNC: 8.5 MG/DL (ref 8.7–10.5)
CHLORIDE SERPL-SCNC: 108 MMOL/L (ref 95–110)
CO2 SERPL-SCNC: 24 MMOL/L (ref 23–29)
CREAT SERPL-MCNC: 0.6 MG/DL (ref 0.5–1.4)
ERYTHROCYTE [DISTWIDTH] IN BLOOD BY AUTOMATED COUNT: 17.7 % (ref 11.5–14.5)
GFR SERPLBLD CREATININE-BSD FMLA CKD-EPI: >60 ML/MIN/1.73/M2
GLUCOSE SERPL-MCNC: 102 MG/DL (ref 70–110)
HCT VFR BLD AUTO: 38 % (ref 37–48.5)
HGB BLD-MCNC: 11.8 GM/DL (ref 12–16)
IMM GRANULOCYTES # BLD AUTO: 0.02 K/UL (ref 0–0.04)
IMM GRANULOCYTES NFR BLD AUTO: 0.2 % (ref 0–0.5)
LYMPHOCYTES # BLD AUTO: 2.73 K/UL (ref 1–4.8)
MCH RBC QN AUTO: 25 PG (ref 27–31)
MCHC RBC AUTO-ENTMCNC: 31.1 G/DL (ref 32–36)
MCV RBC AUTO: 81 FL (ref 82–98)
NUCLEATED RBC (/100WBC) (OHS): 0 /100 WBC
PLATELET # BLD AUTO: 236 K/UL (ref 150–450)
PMV BLD AUTO: 9.3 FL (ref 9.2–12.9)
POTASSIUM SERPL-SCNC: 4.2 MMOL/L (ref 3.5–5.1)
PROT SERPL-MCNC: 7 GM/DL (ref 6–8.4)
RBC # BLD AUTO: 4.72 M/UL (ref 4–5.4)
RELATIVE EOSINOPHIL (OHS): 2.1 %
RELATIVE LYMPHOCYTE (OHS): 33.1 % (ref 18–48)
RELATIVE MONOCYTE (OHS): 9.8 % (ref 4–15)
RELATIVE NEUTROPHIL (OHS): 54.7 % (ref 38–73)
SODIUM SERPL-SCNC: 140 MMOL/L (ref 136–145)
WBC # BLD AUTO: 8.26 K/UL (ref 3.9–12.7)

## 2025-04-23 PROCEDURE — G2211 COMPLEX E/M VISIT ADD ON: HCPCS | Mod: S$GLB,,, | Performed by: STUDENT IN AN ORGANIZED HEALTH CARE EDUCATION/TRAINING PROGRAM

## 2025-04-23 PROCEDURE — 3074F SYST BP LT 130 MM HG: CPT | Mod: CPTII,S$GLB,, | Performed by: STUDENT IN AN ORGANIZED HEALTH CARE EDUCATION/TRAINING PROGRAM

## 2025-04-23 PROCEDURE — 99214 OFFICE O/P EST MOD 30 MIN: CPT | Mod: S$GLB,,, | Performed by: STUDENT IN AN ORGANIZED HEALTH CARE EDUCATION/TRAINING PROGRAM

## 2025-04-23 PROCEDURE — 1159F MED LIST DOCD IN RCRD: CPT | Mod: CPTII,S$GLB,, | Performed by: STUDENT IN AN ORGANIZED HEALTH CARE EDUCATION/TRAINING PROGRAM

## 2025-04-23 PROCEDURE — 99999 PR PBB SHADOW E&M-EST. PATIENT-LVL V: CPT | Mod: PBBFAC,,, | Performed by: STUDENT IN AN ORGANIZED HEALTH CARE EDUCATION/TRAINING PROGRAM

## 2025-04-23 PROCEDURE — 3008F BODY MASS INDEX DOCD: CPT | Mod: CPTII,S$GLB,, | Performed by: STUDENT IN AN ORGANIZED HEALTH CARE EDUCATION/TRAINING PROGRAM

## 2025-04-23 PROCEDURE — 84155 ASSAY OF PROTEIN SERUM: CPT

## 2025-04-23 PROCEDURE — 3078F DIAST BP <80 MM HG: CPT | Mod: CPTII,S$GLB,, | Performed by: STUDENT IN AN ORGANIZED HEALTH CARE EDUCATION/TRAINING PROGRAM

## 2025-04-23 PROCEDURE — 36415 COLL VENOUS BLD VENIPUNCTURE: CPT

## 2025-04-23 PROCEDURE — 85025 COMPLETE CBC W/AUTO DIFF WBC: CPT

## 2025-04-23 RX ORDER — ESCITALOPRAM OXALATE 10 MG/1
10 TABLET ORAL
COMMUNITY

## 2025-04-23 RX ORDER — GABAPENTIN 300 MG/1
CAPSULE ORAL
Qty: 60 CAPSULE | Refills: 11 | Status: SHIPPED | OUTPATIENT
Start: 2025-04-23

## 2025-04-23 RX ORDER — ALPRAZOLAM 1 MG/1
1 TABLET ORAL 2 TIMES DAILY PRN
COMMUNITY

## 2025-04-23 RX ORDER — FUROSEMIDE 20 MG/1
1 TABLET ORAL 2 TIMES DAILY
COMMUNITY
Start: 2024-10-28 | End: 2025-10-28

## 2025-04-23 RX ORDER — METOPROLOL SUCCINATE 25 MG/1
25 TABLET, EXTENDED RELEASE ORAL
COMMUNITY
Start: 2025-04-01

## 2025-04-23 NOTE — PROGRESS NOTES
[facility]/ [facility]      Chief Complaint:   HR+/Her2+ breast cancer      Cancer Staging   Malignant neoplasm of upper-outer quadrant of right breast in female, estrogen receptor positive  Staging form: Breast, AJCC 8th Edition  - Clinical stage from 2024: Stage IB (cT2, cN0, cM0, G3, ER+, AK+, HER2+) - Signed by Kassie Blakely MD on 2024    HPI:  Madhu Dela Cruz is a 38yo woman who presents today for evaluation of newly diagnosed breast cancer. Oncologic history below:     Per Dr. Blakely's note:   -2023 Screening mammogram with no evidence of malignancy   -2024 Diagnostic mammogram (palpable right breast mass) with  2.2 x 1.8 x 2.1 cm right breast mass   -2024 Needle biopsy of right breast with IDC grade 3, 8mm in greatest dimension, %, AK 40%, HER2 3+, Ki-67 >90%.  -24 Breast MRI with right breast 2.4 cm x 2.1 cm x 2.3 cm mass at the 3 o'clock position. A smaller benign appearing right breast mass. Normal axillary lymph nodes.   -started neoadjuvant TCHP 2024, completed 6 cycles 24  -MRI breast showing resolution of known breast mass   -on 24 she underwent bilateral mastectomies w flap reconstruction; final pathology of Rt breast: no residual carcinoma, 0/4 LN. ypT0N0 (see outside path scanned in)  -continued adjuvant trastuzumab; started adjuvant tamoxifen 24    Gyn History:   Menarche: Age 10   Menopause: Pre    Age at first pregnancy: 15  : 2nd child, not first  HRT: Was on hormonal birth control, stopped   Does not desire fertility preservation.   Family History: Maternal aunt, diagnosed later in life. No ovarian cancer.     She lives in [facility] with her , they run a photo lubin business.        Interval history:   Ms. Dela Cruz returns today for follow up. She received last infusion of adjuvant trastuzumab on 3/6/25.  Her drain site infection after  reconstruction and port removal site have resolved. She has completed antibiotics and finished keflex this past Saturday for 14 days. She remains on jardiance which has improved her shortness of breath and LE swelling - takes lasix as needed, about once a week. Her insomnia is slightly improving, taking medical marijuana or trazodone as needed. She continues to have some joint pain at night occasionally requiring medications like ibuprofen. Hot flashes occur a few times a day and are tolerable. She reports mild nausea a couple of times a week that improves with nausea, no abdominal pain or diarrhea. She notes neuropathy worsening on the left side of her hand with occasional right hand neuropathy. Denies bilateral lower extremity neuropathy. Regarding liver enzymes, she stopped wellbutrin as liver enzymes started to increase. No new medications or supplements. She is not taking tylenol or drinking alcohol.    Patient Active Problem List   Diagnosis    Malignant neoplasm of upper-outer quadrant of right breast in female, estrogen receptor positive    Chemotherapy induced diarrhea    Dehydration       Current Outpatient Medications   Medication Instructions    ALPRAZolam (XANAX) 1 mg, 2 times daily PRN    azar's soln (benadryl 30 mL, mylanta 30 mL, LIDOcaine 30 mL, nystatin 30 mL) 120mL 10 mLs, Oral, 4 times daily, Swish and swallow    ergocalciferol, vitamin D2, (VITAMIN D ORAL) No dose, route, or frequency recorded.    EScitalopram oxalate (LEXAPRO) 5 mg, Oral, Daily    EScitalopram oxalate (LEXAPRO) 10 mg    furosemide (LASIX) 20 MG tablet 1 tablet, 2 times daily    gabapentin (NEURONTIN) 300 MG capsule Take 1 capsule (300 mg total) by mouth in the AM. Take 2 capsules (600 mg total) by mouth at night.    JARDIANCE 10 mg, Daily    LACTOBACILLUS ACIDOPHILUS ORAL No dose, route, or frequency recorded.    levocetirizine (XYZAL) 5 mg, Nightly    LIDOcaine-prilocaine (EMLA) cream Topical (Top), As needed (PRN)     "metoprolol succinate (TOPROL-XL) 25 mg    metoprolol tartrate (LOPRESSOR) 25 mg, Oral, 2 times daily    mupirocin (BACTROBAN) 2 % ointment Topical (Top), 3 times daily    ondansetron (ZOFRAN) 8 mg, Oral, Every 6 hours PRN    oxyCODONE (ROXICODONE) 5 mg, Oral, Every 4 hours PRN    pantoprazole (PROTONIX) 20 mg, 2 times daily    phenazopyridine (PYRIDIUM) 100 mg, Oral, 3 times daily PRN    prochlorperazine (COMPAZINE) 10 mg, Oral, Every 6 hours PRN    tamoxifen (NOLVADEX) 20 mg, Oral, Daily    traZODone (DESYREL) 50 mg, Oral, Nightly       Review of Systems:   Answers submitted by the patient for this visit:  Review of Systems Questionnaire (Submitted on 4/22/2025)  appetite change : No  unexpected weight change: No  mouth sores: No  visual disturbance: No  cough: Yes  shortness of breath: No  chest pain: No  abdominal pain: No  diarrhea: No  frequency: Yes  back pain: Yes  rash: No  headaches: Yes  adenopathy: No  nervous/ anxious: Yes      PHYSICAL EXAM:  /72   Pulse 85   Temp 98.1 °F (36.7 °C) (Oral)   Resp 20   Ht 5' 4" (1.626 m)   Wt 112.9 kg (248 lb 12.6 oz)   SpO2 95%   BMI 42.70 kg/m²      ECOG 0   G.eneral: well appearing, in no apparent distress  HEENT: Normocephalic, EOMI  Cardiovascular: S1 and S2 normal, no murmurs  Lungs: no increased wob, effort normal  Skin: left chest port site appears to be healing well, no rash  Neurologic: Alert and oriented x 4, normal speech   Psychiatric: Conversing appropriately with providers throughout today's encounter.    Pertinent Labs & Imaging:  Reviewed recent labs, imaging and pathology.     Assessment & Plan:  Ms. Dela Cruz is a cuco 40yo woman with recently diagnosed cT2N0 HR+/Her2+ breast cancer who presents today for follow up.     Given that her tumor is as least T2N0, proceeded with neoadjuvant treatment with TCHP. She completed 6 cycles and is s/p bilateral mastectomies on 7/8/24 demonstrating pCR.    In the setting of pCR, have recommend " continuing with trastuzumab only q3 weeks for the remainder of one year. Have since added tamoxifen to address HR+ component of her breast cancer which she is tolerating well thus far. She was in agreement with the above plan.       #HR+/Her2+ breast cancer:  --completed adjuvant trastuzumab 3/6/25  --BRCA testing negative (no pathologic mutations, had to VUS in met and pten genes)  --previously referred to integrative onc - will establish to start acupuncture  --post-treatment echo 4/3/25 w/ stable EF 65-70%  --cont tamoxifen (SOT 8/2024), goal treatment for 5 years  --RTC in 6-8 weeks with labs to check liver enzymes     #Drain infection: cultures + for staph  --improved, completed keflex    #Joint pain: likely exacerbated by tamoxifen.   --cont ibuprofen as needed,  --referral placed to acupuncture previously, will establish with them    #Chemo induced neuropathy:  --adjusted gabapentin 300 mg in AM and 600 mg in PM as left sided hand pain worsening    #Leg swelling: improving  --cont 20 mg lasix prn - taking about once a week  --now on jardiance which has helped  --low albumin may be playing a role     #Hypoalbuminemia  --referred to nutrition previously    #Anxiety   --cont lexapro 10 mg     #Insomnia:  --taking medical marijuana or trazodone as needed    #Transaminitis  --possibly due to recent medications (ie wellbutrin, antibiotics)  --liver ultrasound showed hepatomegaly and hepatic steatosis  --will repeat in 6-8 weeks with hepatitis panel  --consider referral to Hepatology if not not improving    All questions were answered to her apparent satisfaction. Will see her back in 6-8 weeks or sooner should the need arise.    Route Chart for Scheduling    Med Onc Chart Routing      Follow up with physician 6 weeks. 6-8 weeks with MD or GILBERTO   Follow up with GILBERTO    Infusion scheduling note    Injection scheduling note    Labs CMP and CBC   Scheduling:  Preferred lab:  Lab interval:  CMP, CBC, hep panel, PT/INR in  6-8 weeks prior to visit   Imaging    Pharmacy appointment    Other referrals       Additional referrals needed  Heme/Onc psych             Treatment Plan Information   OP BREAST TCHP (pertuzumab trastuzumab DOCEtaxel CARBOplatin) Q3W Kassie Blakely MD   Associated diagnosis: Malignant neoplasm of upper-outer quadrant of right breast in female, estrogen receptor positive Stage IB cT2, cN0, cM0, G3, ER+, DC+, HER2+ noted on 1/17/2024   Line of treatment: First Line  Treatment Goal: Curative     Upcoming Treatment Dates - OP BREAST TCHP (pertuzumab trastuzumab DOCEtaxel CARBOplatin) Q3W    No upcoming days in selected categories.    Supportive Plan Information  IV FLUIDS AND ELECTROLYTES Kassie Blakely MD   Associated Diagnosis: Dehydration   noted on 2/22/2024  Associated Diagnosis: Chemotherapy induced diarrhea   noted on 2/22/2024  Associated Diagnosis: Malignant neoplasm of upper-outer quadrant of right breast in female, estrogen receptor positive Stage IB cT2, cN0, cM0, G3, ER+, DC+, HER2+ noted on 1/17/2024   Line of treatment: Supportive Care   Treatment goal: Supportive     Upcoming Treatment Dates - IV FLUIDS AND ELECTROLYTES    No upcoming days in selected categories.      Total time of this visit, including time spent face to face with patient and/or via video/audio, and also in preparing for today's visit for MDM and documentation. (Medical Decision Making, including consideration of possible diagnoses, management options, complex medical record review, review of diagnostic tests and information, consideration and discussion of significant complications based on comorbidities, and discussion with providers involved with the care of the patient) 30 minutes. Greater than 50% was spent face to face with the patient counseling and coordinating care.    Discussed with Dr. Blakely.    Carol Bay MD   Hematology and Oncology Fellow, PGY VI     I personally interviewed and examined this patient today with  Dr. Bay and agree with the assessment and plan set forth in her note. Madhu reports feeling overall well today; port removal site continues to heal. Discussed etiology of elevated LFTs- recent imaging show only hepatic steatosis and she has since d/c  several medications that could be contributing. Possibly 2/2 tamoxifen, although she started this 8/2024 and LFTs only elevated in the past 2 months. Will repeat labs in 6-8 weeks including Hep panel. Can consider referral to hepatology if these remain elevated.     Kassie Blakely MD

## 2025-05-01 ENCOUNTER — PATIENT MESSAGE (OUTPATIENT)
Dept: HEMATOLOGY/ONCOLOGY | Facility: CLINIC | Age: 40
End: 2025-05-01
Payer: COMMERCIAL

## 2025-05-02 ENCOUNTER — TELEPHONE (OUTPATIENT)
Dept: PSYCHIATRY | Facility: CLINIC | Age: 40
End: 2025-05-02
Payer: COMMERCIAL

## 2025-05-04 ENCOUNTER — PATIENT MESSAGE (OUTPATIENT)
Dept: HEMATOLOGY/ONCOLOGY | Facility: CLINIC | Age: 40
End: 2025-05-04
Payer: COMMERCIAL

## 2025-05-05 DIAGNOSIS — T45.1X5A PERIPHERAL NEUROPATHY DUE TO CHEMOTHERAPY: ICD-10-CM

## 2025-05-05 DIAGNOSIS — G62.0 PERIPHERAL NEUROPATHY DUE TO CHEMOTHERAPY: ICD-10-CM

## 2025-05-05 RX ORDER — GABAPENTIN 300 MG/1
CAPSULE ORAL
Qty: 60 CAPSULE | Refills: 11 | Status: SHIPPED | OUTPATIENT
Start: 2025-05-05 | End: 2025-05-07

## 2025-05-07 DIAGNOSIS — T45.1X5A PERIPHERAL NEUROPATHY DUE TO CHEMOTHERAPY: ICD-10-CM

## 2025-05-07 DIAGNOSIS — G62.0 PERIPHERAL NEUROPATHY DUE TO CHEMOTHERAPY: ICD-10-CM

## 2025-05-07 RX ORDER — GABAPENTIN 300 MG/1
600 CAPSULE ORAL NIGHTLY
Qty: 180 CAPSULE | Refills: 3 | Status: SHIPPED | OUTPATIENT
Start: 2025-05-07 | End: 2026-05-07

## 2025-05-07 NOTE — TELEPHONE ENCOUNTER
----- Message from Talya sent at 5/7/2025 11:13 AM CDT -----  Regarding: Refill  Contact: [phone]  Name of Pharmacy:  [id number] - [facility] [address] [facility] [place] [id number]: [phone] Fax: [phone] Name Of caller:   Tech Name of Medication:  gabapentin (NEURONTIN) 300 MG capsuleComments/advisory:  Requesting clarification on direction

## 2025-05-12 ENCOUNTER — OFFICE VISIT (OUTPATIENT)
Dept: PSYCHIATRY | Facility: CLINIC | Age: 40
End: 2025-05-12
Payer: COMMERCIAL

## 2025-05-12 DIAGNOSIS — R45.89 ANXIETY ABOUT MASTECTOMY: ICD-10-CM

## 2025-05-12 DIAGNOSIS — F43.22 ADJUSTMENT DISORDER WITH ANXIOUS MOOD: Primary | ICD-10-CM

## 2025-05-12 DIAGNOSIS — Z17.0 MALIGNANT NEOPLASM OF UPPER-OUTER QUADRANT OF RIGHT BREAST IN FEMALE, ESTROGEN RECEPTOR POSITIVE: ICD-10-CM

## 2025-05-12 DIAGNOSIS — C50.411 MALIGNANT NEOPLASM OF UPPER-OUTER QUADRANT OF RIGHT BREAST IN FEMALE, ESTROGEN RECEPTOR POSITIVE: ICD-10-CM

## 2025-05-12 PROCEDURE — 1159F MED LIST DOCD IN RCRD: CPT | Mod: CPTII,95,, | Performed by: PSYCHOLOGIST

## 2025-05-12 PROCEDURE — 90791 PSYCH DIAGNOSTIC EVALUATION: CPT | Mod: 95,,, | Performed by: PSYCHOLOGIST

## 2025-05-12 NOTE — PROGRESS NOTES
INFORMED CONSENT/ LIMITS of CONFIDENTIALITY: Prior to beginning the interview, the patient's identification was confirmed using two identifiers. Madhu Dela Cruz  was informed of the possible risks and benefits of psychological interventions (e.g., counseling, psychotherapy, testing) and provided information regarding the handling of protected health records and   the limits of confidentiality, including the importance of reporting any suicidal or homicidal ideation to ensure safety of all parties. This provider explained the purpose of today's appointment and the patient was provided with time to ask questions regarding this information.  Acceptance and understanding of these conditions was expressed, and Madhu Dela Cruz freely consented to this evaluation.     TELEMEDICINE PSYCHO-ONCOLOGY INTAKE    Consultation started: 5/12/2025 at 2:31 PM   The chief complaint leading to consultation is: adjustment  Virtual visit with synchronous audio and video    The patient location is:  Patient home  Patient alone at the time of consultation.    Each patient provided medical services by telemedicine is:  (1) informed of the relationship between the physician and patient and the respective role of any other health care provider with respect to management of the patient; and (2) notified that he or she may decline to receive medical services by telemedicine and may withdraw from such care at any time    Crisis Disclaimer: Patient was informed that due to the virtual nature of the visit, that if a crisis develops, protocols will be implemented to ensure patient safety, including but not limited to: 1) Initiating a welfare check with local Law Enforcement, 2) Calling 1/National Crisis Hotline, and/or 3) Initiating PEC/CEC procedures.     Diagnostic Interview - CPT 76994    Site of Clinician: [facility]     Evaluation Length (direct face-to-face time):  1 hour     Referral Source: Carol Bay  MD   Oncologist:   PCP: Davis Hanson MD    Clinical status of patient: Outpatient    Madhu Dela Cruz, a 40 y.o. female, seen for initial evaluation visit.  Met with patient.    Chief complaint/reason for encounter: adjustment to illness, depression and anxiety    Medical/Surgical History:    Patient Active Problem List   Diagnosis    Malignant neoplasm of upper-outer quadrant of right breast in female, estrogen receptor positive    Chemotherapy induced diarrhea    Dehydration       Health Behaviors:       ETOH Use: No (rare)       Tobacco Use: No   Illicit Drug Use:  No     Prescription Misuse:No   Caffeine: minimal   Exercise:The patient engages in environmental activity only.   Firearms:  Yes, Stored Safely   Advanced directives:No     Family History:   Psychiatric illness: Yes Cousin with Untreated Depression; Father with Depression; Mother with depression    Alcohol/Drug Abuse: No     Suicide: Yes, Cousin 1 yr ago, was close      Past Psychiatric History:   Inpatient treatment: No     Outpatient treatment: Yes  Therapy for marital- individual (infidelity): Has thoughts about still being attractive to her  given history of infidelity; Saw Dr. Cohn once last years-was not ready for therapy   Prior substance abuse treatment: No     Suicide Attempts: No     Psychotropic Medications:  Current: Lexapro , Trazodone      Past: Wellbutrin    Current medications as per below, allergies reviewed in chart.    Current Outpatient Medications   Medication    ALPRAZolam (XANAX) 1 MG tablet    ergocalciferol, vitamin D2, (VITAMIN D ORAL)    EScitalopram oxalate (LEXAPRO) 10 MG tablet    furosemide (LASIX) 20 MG tablet    gabapentin (NEURONTIN) 300 MG capsule    JARDIANCE 10 mg tablet    LACTOBACILLUS ACIDOPHILUS ORAL    levocetirizine (XYZAL) 5 MG tablet    metoprolol succinate (TOPROL-XL) 25 MG 24 hr tablet    mupirocin (BACTROBAN) 2 % ointment    ondansetron (ZOFRAN) 8 MG tablet    oxyCODONE  (ROXICODONE) 5 MG immediate release tablet    pantoprazole (PROTONIX) 20 MG tablet    phenazopyridine (PYRIDIUM) 100 MG tablet    prochlorperazine (COMPAZINE) 5 MG tablet    tamoxifen (NOLVADEX) 20 MG Tab    traZODone (DESYREL) 50 MG tablet     Current Facility-Administered Medications   Medication Frequency    copper intrauterine device 380 square mm 380 mm IUD          Social situation  Living/Social History  Current living situation: lives with , son (17) in [facility]  Marital status:  since 2015  Partner/Spouse Name: Tim  Family of Origin: Parents: Mother  Siblings:Sister  Children/Dependents: 2 children , daughter 24, son 17 ([employer] School [employer])  Social support: good    Primary supports: , Mother, Sister, Daughter, Son    Spiritual/Methodist: Jain.    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   Partner/Spouse Employment Status: employed- [employer]    Educational/ Linguistic History  Education level: college graduate      Stressors: Current: No interests  Additional stressors: None  Strengths:Values and traditions, Motivation, readiness for change, Setting and pursuing goals, hopes, dreams, aspirations, Interpersonal relationships and supports available - family, relatives, friends, and Cultural/spiritual/Quaker and community involvement  Current Evaluation:     Mental Status Exam: Madhu Dela Cruz arrived promptly for the assessment session.  The patient was fully cooperative throughout the interview and was an adequate historian   Appearance: age appropriate, appropriately  dressed, adequately  groomed  Behavior/Cooperation: friendly and cooperative  Speech: normal in rate, volume, and tone and appropriate quality, quantity and organization of sentences  Mood: anxious, sad  Affect: mood congruent, tearful  Thought Process: goal-directed, logical  Thought Content:  normal, no suicidality, no homicidality, delusions, or paranoia;did not appear to be responding to internal stimuli during the interview.   Orientation: grossly intact  Memory: Grossly intact  Attention Span/Concentration: Attends to interview without distraction; reports no difficulty  Fund of Knowledge: average  Estimate of Intelligence: average from verbal skills and history  Cognition: grossly intact  Insight: patient has awareness of illness; good insight into own behavior and behavior of others  Judgment: the patient's behavior is adequate to circumstances    Distress Score    Distress Score: 5        Practical Problems Physical Problems                                                   Family Problems                                         Emotional Problems                                                         Spiritual/Religions Concerns               Other Problems            PHQ ANSWERS    Q1. Little interest or pleasure in doing things: (Patient-Rptd) (P) Not at all (05/12/25 1427)  Q2. Feeling down, depressed, or hopeless: (Patient-Rptd) (P) Not at all (05/12/25 1427)  Q3. Trouble falling or staying asleep, or sleeping too much: (Patient-Rptd) (P) Several days (05/12/25 1427)  Q4. Feeling tired or having little energy: (Patient-Rptd) (P) Several days (05/12/25 1427)  Q5. Poor appetite or overeating: (Patient-Rptd) (P) Several days (05/12/25 1427)  Q6. Feeling bad about yourself - or that you are a failure or have let yourself or your family down: (Patient-Rptd) (P) Not at all (05/12/25 1427)  Q7. Trouble concentrating on things, such as reading the newspaper or watching television: (Patient-Rptd) (P) Not at all (05/12/25 1427)  Q8. Moving or speaking so slowly that other people could have noticed. Or the opposite - being so fidgety or restless that you have been moving around a lot more than usual: (Patient-Rptd) (P) Not at all (05/12/25 1427)  Q9.  0    PHQ8 Score : (Patient-Rptd) (P) 3 (05/12/25  5366)  PHQ-9 Total Score: (Patient-Rptd) (P) 3 (05/12/25 1427)     ROMELIA-7 Answers        5/12/2025     2:26 PM   GAD7   1. Feeling nervous, anxious, or on edge? 1   2. Not being able to stop or control worrying? 0   3. Worrying too much about different things? 1   4. Trouble relaxing? 0   5. Being so restless that it is hard to sit still? 0   6. Becoming easily annoyed or irritable? 1   7. Feeling afraid as if something awful might happen? 0   8. If you checked off any problems, how difficult have these problems made it for you to do your work, take care of things at home, or get along with other people? 0   ROMELIA-7 Score 3        Patient-reported     ROMELIA-7 Score: (Patient-Rptd) (P) 3  Interpretation: (Patient-Rptd) (P) Normal       History of present illness:    Oncology History   Malignant neoplasm of upper-outer quadrant of right breast in female, estrogen receptor positive   1/17/2024 Initial Diagnosis    Malignant neoplasm of upper-outer quadrant of right breast in female, estrogen receptor positive     1/18/2024 Cancer Staged    Staging form: Breast, AJCC 8th Edition  - Clinical stage from 1/18/2024: Stage IB (cT2, cN0, cM0, G3, ER+, CT+, HER2+)     2/15/2024 -  Chemotherapy    Treatment Summary   Plan Name: OP BREAST TCHP (pertuzumab trastuzumab DOCEtaxel CARBOplatin) Q3W  Treatment Goal: Curative  Status: Active  Start Date: 2/15/2024  End Date: 3/6/2025  Provider: Kassie Blakely MD  Chemotherapy: CARBOplatin (PARAPLATIN) 900 mg in sodium chloride 0.9% 285 mL chemo infusion, 900 mg, Intravenous, Clinic/HOD 1 time, 6 of 6 cycles  Administration: 900 mg (2/15/2024), 900 mg (3/6/2024), 900 mg (3/28/2024), 900 mg (4/19/2024), 900 mg (5/10/2024), 900 mg (5/31/2024)  DOCEtaxel (TAXOTERE) 75 mg/m2 = 154 mg in sodium chloride 0.9% 300.4 mL chemo infusion, 75 mg/m2 = 154 mg, Intravenous, Clinic/HOD 1 time, 6 of 6 cycles  Dose modification: 60 mg/m2 (original dose 75 mg/m2, Cycle 4)  Administration: 154 mg  (2/15/2024), 154 mg (3/6/2024), 154 mg (3/28/2024), 120 mg (4/19/2024), 120 mg (5/10/2024), 120 mg (5/31/2024)  pertuzumab (PERJETA) 840 mg in sodium chloride 0.9% 313 mL infusion, 840 mg, Intravenous, Clinic/HOD 1 time, 6 of 6 cycles  Administration: 840 mg (2/15/2024), 420 mg (3/6/2024), 420 mg (3/28/2024), 420 mg (4/19/2024), 420 mg (5/10/2024), 420 mg (5/31/2024)  trastuzumab-anns (KANJINTI) 747 mg in sodium chloride 0.9% 320.57 mL chemo infusion, 8 mg/kg = 747 mg, Intravenous, Clinic/HOD 1 time, 17 of 17 cycles  Dose modification: 8 mg/kg (original dose 6 mg/kg, Cycle 7)  Administration: 747 mg (2/15/2024), 560 mg (3/6/2024), 560 mg (3/28/2024), 560 mg (4/19/2024), 600 mg (8/30/2024), 560 mg (5/10/2024), 560 mg (5/31/2024), 828 mg (8/9/2024), 600 mg (9/20/2024), 600 mg (10/11/2024), 600 mg (11/1/2024), 600 mg (11/22/2024), 600 mg (12/12/2024), 600 mg (1/3/2025), 600 mg (1/25/2025), 600 mg (2/13/2025), 600 mg (3/6/2025)       Patient with TPBC. Completed bilateral mastectomies with infection complication. Patient reported challenges with adjustment following treatment, specifically pain with Tamoxifen, fatigue. Patient also expresses worry of recurrence, worries about small changes and aches and pain. Avoided emotions and negative thinking during treatment. Places pressure on self to recover quickly.     Madhu Belchermarlene has adjusted to illness with difficulty primarily through focus on alternative activities, focus on work, and focus on family. She has engaged in appropriate information gathering.  The patient has good family/friend support.  Her support system is coping well with the diagnosis/treatment/prognosis. Illness-related psychosocial stressors include difficulty meeting family responsibilities and changes in ability to engage in leisure activities.  The patient has a good partnership with [place] [facility] [place] [place]. The patient reports the following barriers to cancer care:none.      Patient Reported Cancer Treatment Symptoms:  bone pain and fatigue    Behavioral Health Symptoms:   Mood: Depression: fatigue and tearfulness low mood;  no prior; no SI/HI  Ericka: Denies  Psychosis: Denies   Anxiety: Feeling nervous, anxious, or on edge, Uncontrollable worry (about recurrence), Excessive worry (interfering with mood), Difficulty relaxing, Restlessness, Irritability, and Fear of unknown; no prior  Generalized anxiety: Denies    Panic Disorder: Denies  Social/specific phobia: Denies   OCD: Denies  Trauma: Denies; Psychological Manipulation by father  Sexual Dysfunction:  Denies  Substance abuse: denied  Cognitive functioning: denied  Health behaviors: noncontributory  Sleep: Trazodone, Gabapentin- neuropathy present, trouble falling and staying asleep.  Pain: Ms. Dela Cruz reports neuropathy pain. Symptoms interfere with daily activity, sleeping and work.   CAM Therapies: THC           Assessment - Diagnosis - Goals:       ICD-10-CM ICD-9-CM   1. Adjustment disorder with anxious mood  F43.22 309.24   2. Anxiety about mastectomy  R45.89 799.29   3. Malignant neoplasm of upper-outer quadrant of right breast in female, estrogen receptor positive  C50.411 174.4    Z17.0 V86.0          Plan:individual psychotherapy and medication management by physician    Summary and Recommendations  Madhu Dela Cruz is a 40 y.o. female referred by team for psychological evaluation and treatment.  Ms. Dela Cruz appears to be coping poorly with her diagnosis and proposed treatment course. Patient has good support system, good relationship with spouse or significant other, and good relationship with children. Mood protective strategies during cancer treatment were discussed.  She is interested in individual therapy for cancer coping skills and will follow up with me for that purpose..      Next Session: Emotion Explanation, Confronting Emotions    Arline Ortiz, PhD  Clinical Psychologist  [place] License #[id number]  [place]  License #[id number]

## 2025-05-29 ENCOUNTER — PATIENT MESSAGE (OUTPATIENT)
Dept: PSYCHIATRY | Facility: CLINIC | Age: 40
End: 2025-05-29
Payer: COMMERCIAL

## 2025-05-29 ENCOUNTER — OFFICE VISIT (OUTPATIENT)
Dept: PSYCHIATRY | Facility: CLINIC | Age: 40
End: 2025-05-29
Payer: COMMERCIAL

## 2025-05-29 DIAGNOSIS — C50.411 MALIGNANT NEOPLASM OF UPPER-OUTER QUADRANT OF RIGHT BREAST IN FEMALE, ESTROGEN RECEPTOR POSITIVE: ICD-10-CM

## 2025-05-29 DIAGNOSIS — F43.22 ADJUSTMENT DISORDER WITH ANXIOUS MOOD: Primary | ICD-10-CM

## 2025-05-29 DIAGNOSIS — Z17.0 MALIGNANT NEOPLASM OF UPPER-OUTER QUADRANT OF RIGHT BREAST IN FEMALE, ESTROGEN RECEPTOR POSITIVE: ICD-10-CM

## 2025-05-29 PROCEDURE — 1159F MED LIST DOCD IN RCRD: CPT | Mod: CPTII,95,, | Performed by: PSYCHOLOGIST

## 2025-05-29 PROCEDURE — 90834 PSYTX W PT 45 MINUTES: CPT | Mod: 95,,, | Performed by: PSYCHOLOGIST

## 2025-06-11 ENCOUNTER — PATIENT MESSAGE (OUTPATIENT)
Dept: HEMATOLOGY/ONCOLOGY | Facility: CLINIC | Age: 40
End: 2025-06-11

## 2025-06-11 ENCOUNTER — OFFICE VISIT (OUTPATIENT)
Dept: HEMATOLOGY/ONCOLOGY | Facility: CLINIC | Age: 40
End: 2025-06-11
Payer: COMMERCIAL

## 2025-06-11 ENCOUNTER — LAB VISIT (OUTPATIENT)
Dept: LAB | Facility: HOSPITAL | Age: 40
End: 2025-06-11
Attending: STUDENT IN AN ORGANIZED HEALTH CARE EDUCATION/TRAINING PROGRAM
Payer: COMMERCIAL

## 2025-06-11 VITALS
TEMPERATURE: 98 F | RESPIRATION RATE: 16 BRPM | HEART RATE: 74 BPM | DIASTOLIC BLOOD PRESSURE: 62 MMHG | BODY MASS INDEX: 41.67 KG/M2 | HEIGHT: 64 IN | SYSTOLIC BLOOD PRESSURE: 97 MMHG | OXYGEN SATURATION: 95 % | WEIGHT: 244.06 LBS

## 2025-06-11 DIAGNOSIS — Z17.0 MALIGNANT NEOPLASM OF UPPER-OUTER QUADRANT OF RIGHT BREAST IN FEMALE, ESTROGEN RECEPTOR POSITIVE: ICD-10-CM

## 2025-06-11 DIAGNOSIS — M25.50 ARTHRALGIA, UNSPECIFIED JOINT: ICD-10-CM

## 2025-06-11 DIAGNOSIS — R74.01 TRANSAMINITIS: ICD-10-CM

## 2025-06-11 DIAGNOSIS — T45.1X5A PERIPHERAL NEUROPATHY DUE TO CHEMOTHERAPY: Primary | ICD-10-CM

## 2025-06-11 DIAGNOSIS — T45.1X5A CHEMOTHERAPY-INDUCED NEUROPATHY: ICD-10-CM

## 2025-06-11 DIAGNOSIS — C50.411 MALIGNANT NEOPLASM OF UPPER-OUTER QUADRANT OF RIGHT BREAST IN FEMALE, ESTROGEN RECEPTOR POSITIVE: ICD-10-CM

## 2025-06-11 DIAGNOSIS — G62.0 CHEMOTHERAPY-INDUCED NEUROPATHY: ICD-10-CM

## 2025-06-11 DIAGNOSIS — G62.0 PERIPHERAL NEUROPATHY DUE TO CHEMOTHERAPY: Primary | ICD-10-CM

## 2025-06-11 LAB
ABSOLUTE EOSINOPHIL (OHS): 0.14 K/UL
ABSOLUTE MONOCYTE (OHS): 0.76 K/UL (ref 0.3–1)
ABSOLUTE NEUTROPHIL COUNT (OHS): 3.66 K/UL (ref 1.8–7.7)
ALBUMIN SERPL BCP-MCNC: 3.3 G/DL (ref 3.5–5.2)
ALBUMIN SERPL BCP-MCNC: 3.4 G/DL (ref 3.5–5.2)
ALP SERPL-CCNC: 110 UNIT/L (ref 40–150)
ALP SERPL-CCNC: 113 UNIT/L (ref 40–150)
ALT SERPL W/O P-5'-P-CCNC: 107 UNIT/L (ref 10–44)
ALT SERPL W/O P-5'-P-CCNC: 99 UNIT/L (ref 10–44)
ANION GAP (OHS): 8 MMOL/L (ref 8–16)
ANION GAP (OHS): 9 MMOL/L (ref 8–16)
AST SERPL-CCNC: 102 UNIT/L (ref 11–45)
AST SERPL-CCNC: 96 UNIT/L (ref 11–45)
BASOPHILS # BLD AUTO: 0.02 K/UL
BASOPHILS NFR BLD AUTO: 0.3 %
BILIRUB SERPL-MCNC: 0.4 MG/DL (ref 0.1–1)
BILIRUB SERPL-MCNC: 0.4 MG/DL (ref 0.1–1)
BUN SERPL-MCNC: 11 MG/DL (ref 6–20)
BUN SERPL-MCNC: 11 MG/DL (ref 6–20)
CALCIUM SERPL-MCNC: 8.4 MG/DL (ref 8.7–10.5)
CALCIUM SERPL-MCNC: 8.4 MG/DL (ref 8.7–10.5)
CHLORIDE SERPL-SCNC: 105 MMOL/L (ref 95–110)
CHLORIDE SERPL-SCNC: 106 MMOL/L (ref 95–110)
CO2 SERPL-SCNC: 24 MMOL/L (ref 23–29)
CO2 SERPL-SCNC: 25 MMOL/L (ref 23–29)
CREAT SERPL-MCNC: 0.7 MG/DL (ref 0.5–1.4)
CREAT SERPL-MCNC: 0.7 MG/DL (ref 0.5–1.4)
ERYTHROCYTE [DISTWIDTH] IN BLOOD BY AUTOMATED COUNT: 16.1 % (ref 11.5–14.5)
GFR SERPLBLD CREATININE-BSD FMLA CKD-EPI: >60 ML/MIN/1.73/M2
GFR SERPLBLD CREATININE-BSD FMLA CKD-EPI: >60 ML/MIN/1.73/M2
GLUCOSE SERPL-MCNC: 89 MG/DL (ref 70–110)
GLUCOSE SERPL-MCNC: 90 MG/DL (ref 70–110)
HAV IGM SERPL QL IA: NORMAL
HBV CORE IGM SERPL QL IA: NORMAL
HBV SURFACE AG SERPL QL IA: NORMAL
HCT VFR BLD AUTO: 41 % (ref 37–48.5)
HCV AB SERPL QL IA: NORMAL
HGB BLD-MCNC: 13.1 GM/DL (ref 12–16)
IMM GRANULOCYTES # BLD AUTO: 0.02 K/UL (ref 0–0.04)
IMM GRANULOCYTES NFR BLD AUTO: 0.3 % (ref 0–0.5)
INR PPP: 1.1 (ref 0.8–1.2)
LYMPHOCYTES # BLD AUTO: 2.66 K/UL (ref 1–4.8)
MCH RBC QN AUTO: 26.1 PG (ref 27–31)
MCHC RBC AUTO-ENTMCNC: 32 G/DL (ref 32–36)
MCV RBC AUTO: 82 FL (ref 82–98)
NUCLEATED RBC (/100WBC) (OHS): 0 /100 WBC
PLATELET # BLD AUTO: 208 K/UL (ref 150–450)
PMV BLD AUTO: 9.1 FL (ref 9.2–12.9)
POTASSIUM SERPL-SCNC: 4.1 MMOL/L (ref 3.5–5.1)
POTASSIUM SERPL-SCNC: 4.1 MMOL/L (ref 3.5–5.1)
PROT SERPL-MCNC: 6.9 GM/DL (ref 6–8.4)
PROT SERPL-MCNC: 7 GM/DL (ref 6–8.4)
PROTHROMBIN TIME: 12.2 SECONDS (ref 9–12.5)
RBC # BLD AUTO: 5.01 M/UL (ref 4–5.4)
RELATIVE EOSINOPHIL (OHS): 1.9 %
RELATIVE LYMPHOCYTE (OHS): 36.6 % (ref 18–48)
RELATIVE MONOCYTE (OHS): 10.5 % (ref 4–15)
RELATIVE NEUTROPHIL (OHS): 50.4 % (ref 38–73)
SODIUM SERPL-SCNC: 138 MMOL/L (ref 136–145)
SODIUM SERPL-SCNC: 139 MMOL/L (ref 136–145)
WBC # BLD AUTO: 7.26 K/UL (ref 3.9–12.7)

## 2025-06-11 PROCEDURE — G2211 COMPLEX E/M VISIT ADD ON: HCPCS | Mod: S$GLB,,, | Performed by: STUDENT IN AN ORGANIZED HEALTH CARE EDUCATION/TRAINING PROGRAM

## 2025-06-11 PROCEDURE — 1159F MED LIST DOCD IN RCRD: CPT | Mod: CPTII,S$GLB,, | Performed by: STUDENT IN AN ORGANIZED HEALTH CARE EDUCATION/TRAINING PROGRAM

## 2025-06-11 PROCEDURE — 82040 ASSAY OF SERUM ALBUMIN: CPT

## 2025-06-11 PROCEDURE — 36415 COLL VENOUS BLD VENIPUNCTURE: CPT

## 2025-06-11 PROCEDURE — 99215 OFFICE O/P EST HI 40 MIN: CPT | Mod: S$GLB,,, | Performed by: STUDENT IN AN ORGANIZED HEALTH CARE EDUCATION/TRAINING PROGRAM

## 2025-06-11 PROCEDURE — 3008F BODY MASS INDEX DOCD: CPT | Mod: CPTII,S$GLB,, | Performed by: STUDENT IN AN ORGANIZED HEALTH CARE EDUCATION/TRAINING PROGRAM

## 2025-06-11 PROCEDURE — 3074F SYST BP LT 130 MM HG: CPT | Mod: CPTII,S$GLB,, | Performed by: STUDENT IN AN ORGANIZED HEALTH CARE EDUCATION/TRAINING PROGRAM

## 2025-06-11 PROCEDURE — 99999 PR PBB SHADOW E&M-EST. PATIENT-LVL V: CPT | Mod: PBBFAC,,, | Performed by: STUDENT IN AN ORGANIZED HEALTH CARE EDUCATION/TRAINING PROGRAM

## 2025-06-11 PROCEDURE — 3078F DIAST BP <80 MM HG: CPT | Mod: CPTII,S$GLB,, | Performed by: STUDENT IN AN ORGANIZED HEALTH CARE EDUCATION/TRAINING PROGRAM

## 2025-06-11 PROCEDURE — 85025 COMPLETE CBC W/AUTO DIFF WBC: CPT

## 2025-06-11 PROCEDURE — 85610 PROTHROMBIN TIME: CPT

## 2025-06-11 PROCEDURE — 80074 ACUTE HEPATITIS PANEL: CPT

## 2025-06-11 NOTE — PROGRESS NOTES
[facility]      Chief Complaint:   HR+/Her2+ breast cancer      Cancer Staging   Malignant neoplasm of upper-outer quadrant of right breast in female, estrogen receptor positive  Staging form: Breast, AJCC 8th Edition  - Clinical stage from 2024: Stage IB (cT2, cN0, cM0, G3, ER+, AZ+, HER2+) - Signed by Kassie Blakely MD on 2024    HPI:  Madhu Dela Cruz is a 38yo woman who presents today for evaluation of newly diagnosed breast cancer. Oncologic history below:       -2023 Screening mammogram with no evidence of malignancy   -2024 Diagnostic mammogram (palpable right breast mass) with  2.2 x 1.8 x 2.1 cm right breast mass   -2024 Needle biopsy of right breast with IDC grade 3, 8mm in greatest dimension, %, AZ 40%, HER2 3+, Ki-67 >90%.  -24 Breast MRI with right breast 2.4 cm x 2.1 cm x 2.3 cm mass at the 3 o'clock position. A smaller benign appearing right breast mass. Normal axillary lymph nodes.   -started neoadjuvant TCHP 2024, completed 6 cycles 24  -MRI breast showing resolution of known breast mass   -on 24 she underwent bilateral mastectomies w flap reconstruction; final pathology of Rt breast: no residual carcinoma, 0/4 LN. ypT0N0 (see outside path scanned in)  -continued adjuvant trastuzumab; started adjuvant tamoxifen 24    Gyn History:   Menarche: Age 10   Menopause: Pre    Age at first pregnancy: 15  : 2nd child, not first  HRT: Was on hormonal birth control, stopped   Does not desire fertility preservation.   Family History: Maternal aunt, diagnosed later in life. No ovarian cancer.     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        Interval history:   Ms. Dela Cruz returns today for follow up.  She has been doing well since her last visit and continues to tolerate tamoxifen without difficulty.  Was discouraged  to see that her liver enzymes remained elevated.  She started Mounjaro approximately 2 months ago and has lost about 8 lb.  Notes that overall she did gain about 40 lb over the course of her treatment.  Her neuropathy remains well-controlled with gabapentin nightly.  She is otherwise feeling well and denies new concerns.        Patient Active Problem List   Diagnosis    Malignant neoplasm of upper-outer quadrant of right breast in female, estrogen receptor positive    Chemotherapy induced diarrhea    Dehydration       Current Outpatient Medications   Medication Instructions    ALPRAZolam (XANAX) 1 mg, 2 times daily PRN    ergocalciferol, vitamin D2, (VITAMIN D ORAL) No dose, route, or frequency recorded.    EScitalopram oxalate (LEXAPRO) 10 mg    furosemide (LASIX) 20 MG tablet 1 tablet, 2 times daily    gabapentin (NEURONTIN) 600 mg, Oral, Nightly, Take 1 capsule (300 mg total) by mouth in the AM. Take 2 capsules (600 mg total) by mouth at night.    JARDIANCE 10 mg, Daily    LACTOBACILLUS ACIDOPHILUS ORAL No dose, route, or frequency recorded.    levocetirizine (XYZAL) 5 mg, Nightly    metoprolol succinate (TOPROL-XL) 25 mg    mupirocin (BACTROBAN) 2 % ointment Topical (Top), 3 times daily    ondansetron (ZOFRAN) 8 mg, Oral, Every 6 hours PRN    oxyCODONE (ROXICODONE) 5 mg, Oral, Every 4 hours PRN    pantoprazole (PROTONIX) 20 mg, 2 times daily    phenazopyridine (PYRIDIUM) 100 mg, Oral, 3 times daily PRN    prochlorperazine (COMPAZINE) 10 mg, Oral, Every 6 hours PRN    tamoxifen (NOLVADEX) 20 mg, Oral, Daily    traZODone (DESYREL) 50 mg, Oral, Nightly       Review of Systems:   Answers submitted by the patient for this visit:  Review of Systems Questionnaire (Submitted on 6/10/2025)  appetite change : No  unexpected weight change: No  mouth sores: No  visual disturbance: No  cough: No  shortness of breath: No  chest pain: No  abdominal pain: No  diarrhea: No  frequency: No  back pain: No  rash: No  headaches:  "No  adenopathy: No  nervous/ anxious: No      PHYSICAL EXAM:  BP 97/62 (BP Location: Left arm, Patient Position: Sitting)   Pulse 74   Temp 98.1 °F (36.7 °C) (Oral)   Resp 16   Ht 5' 4" (1.626 m)   Wt 110.7 kg (244 lb 0.8 oz)   SpO2 95%   BMI 41.89 kg/m²      ECOG 0   G.eneral: well appearing, in no apparent distress  HEENT: Normocephalic, EOMI  Cardiovascular: well-perfused  Lungs: no increased wob  Breast: s/p b/l mastectomies w reconstruction, incisions well healed   Skin: no rash  Neurologic: Alert and oriented x 4, normal speech   Psychiatric: Conversing appropriately with providers throughout today's encounter.      Pertinent Labs & Imaging:  Reviewed recent labs, imaging and pathology.         Assessment & Plan:  Ms. Dela Cruz is a cuco 39yo woman with recently diagnosed cT2N0 HR+/Her2+ breast cancer who presents today for follow up.     Given that her tumor is as least T2N0, proceeded with neoadjuvant treatment with TCHP. She completed 6 cycles and is s/p bilateral mastectomies on 7/8/24 demonstrating pCR.    In the setting of pCR, have recommend continuing with trastuzumab only q3 weeks for the remainder of one year. Have since added tamoxifen to address HR+ component of her breast cancer which she is tolerating well thus far.     Given elevated LFTs and hepatic steatosis noted on imaging will hold tamoxifen for now- see below.    #Transaminitis: non-obstructive pattern. Hepatic steatosis w hepatomegaly noted on imaging.   --possibly 2/2 tamoxifen? (LFT elevation began approx 6mo after starting Patel). No other obvious medications contributing. She has been avoiding tylenol and EtOH. Does not weight gain while on treatment so also possibly contributed  --hep panel negative  --referral placed to hepatology  --will see her back in 3mo and repeat labs. Discuss resuming patel at that time       #HR+/Her2+ breast cancer:  --completed adjuvant trastuzumab 3/6/25  --tamoxifen (SOT 8/2024), holding for now " given elevated LFTs. She had a pCR w Her2 directed therapy so do not feel like resuming this is something we need to do at the cost of her liver. Will have her discuss w hepatology and hold Rowley; can consider resuming at 3mo if numbers improved  --RTC 3mo      #Joint pain: improving  --cont ibuprofen as needed  --referral placed to acupuncture previously      #Chemo induced neuropathy: stable   --cont gabapentin 600 mg in PM       #Anxiety   --cont lexapro 10 mg       #Insomnia:  --taking medical marijuana or trazodone as needed        All questions were answered to her apparent satisfaction. Will see her back in 3mo or sooner should the need arise.    Route Chart for Scheduling    Med Onc Chart Routing      Follow up with physician 6 months.   Follow up with GILBERTO 3 months.   Infusion scheduling note    Injection scheduling note    Labs CMP   Scheduling:  Preferred lab:  Lab interval:  CMP in 3mo   Imaging    Pharmacy appointment    Other referrals                Treatment Plan Information   OP BREAST TCHP (pertuzumab trastuzumab DOCEtaxel CARBOplatin) Q3W Kassie Blakely MD   Associated diagnosis: Malignant neoplasm of upper-outer quadrant of right breast in female, estrogen receptor positive Stage IB cT2, cN0, cM0, G3, ER+, DC+, HER2+ noted on 1/17/2024   Line of treatment: First Line  Treatment Goal: Curative     Upcoming Treatment Dates - OP BREAST TCHP (pertuzumab trastuzumab DOCEtaxel CARBOplatin) Q3W    No upcoming days in selected categories.    Supportive Plan Information  IV FLUIDS AND ELECTROLYTES Kassie Blakely MD   Associated Diagnosis: Dehydration   noted on 2/22/2024  Associated Diagnosis: Chemotherapy induced diarrhea   noted on 2/22/2024  Associated Diagnosis: Malignant neoplasm of upper-outer quadrant of right breast in female, estrogen receptor positive Stage IB cT2, cN0, cM0, G3, ER+, DC+, HER2+ noted on 1/17/2024   Line of treatment: Supportive Care   Treatment goal: Supportive     Upcoming  Treatment Dates - IV FLUIDS AND ELECTROLYTES    No upcoming days in selected categories.          MDM includes  :    - Acute or chronic illness or injury that poses a threat to life or bodily function  - Review of prior external notes from unique source  - Independent review and explanation of 3+ results from unique tests  - Discussion of management and ordering 3+ unique tests  - Extensive discussion of treatment and management  - Prescription drug management  - Drug therapy requiring intensive monitoring for toxicity    Kassie Blakely MD

## 2025-06-13 ENCOUNTER — PATIENT MESSAGE (OUTPATIENT)
Dept: PSYCHIATRY | Facility: CLINIC | Age: 40
End: 2025-06-13
Payer: COMMERCIAL

## 2025-06-13 ENCOUNTER — OFFICE VISIT (OUTPATIENT)
Dept: PSYCHIATRY | Facility: CLINIC | Age: 40
End: 2025-06-13
Payer: COMMERCIAL

## 2025-06-13 DIAGNOSIS — F43.22 ADJUSTMENT DISORDER WITH ANXIOUS MOOD: Primary | ICD-10-CM

## 2025-06-13 DIAGNOSIS — C50.411 MALIGNANT NEOPLASM OF UPPER-OUTER QUADRANT OF RIGHT BREAST IN FEMALE, ESTROGEN RECEPTOR POSITIVE: ICD-10-CM

## 2025-06-13 DIAGNOSIS — Z17.0 MALIGNANT NEOPLASM OF UPPER-OUTER QUADRANT OF RIGHT BREAST IN FEMALE, ESTROGEN RECEPTOR POSITIVE: ICD-10-CM

## 2025-06-13 PROCEDURE — 90834 PSYTX W PT 45 MINUTES: CPT | Mod: 95,,, | Performed by: PSYCHOLOGIST

## 2025-06-13 PROCEDURE — 1159F MED LIST DOCD IN RCRD: CPT | Mod: CPTII,95,, | Performed by: PSYCHOLOGIST

## 2025-06-13 NOTE — PROGRESS NOTES
INFORMED CONSENT: Patient was identified using two patient-identifiers. The patient has been informed of the risks and benefits associated with engaging in psychotherapy, the handling of protected health information, the rights of privacy and the limits of confidentiality. The patient has also been informed of the importance of reporting any suicidal or homicidal ideation to this or any provider to ensure safety of all parties, and the Madhu Dela Cruz expressed understanding. The patient was agreeable to these terms and freely participates in individual psychotherapy.    Telemedicine PSYCHO-ONCOLOGY NOTE/ Individual Psychotherapy     Consultation started: 6/13/2025 at 1:01 PM   The chief complaint leading to consultation is: adjustment  The patient location is: [place],  Patient home  Virtual visit with synchronous audio and video   Patient alone at the time of consultation      Crisis Disclaimer: Patient was informed that due to the virtual nature of the visit, that if a crisis develops, protocols will be implemented to ensure patient safety, including but not limited to: 1) Initiating a welfare check with local Law Enforcement, 2) Calling 1/National Crisis Hotline, and/or 3) Initiating PEC/CEC procedures.      Each patient provided medical services by telemedicine is:  (1) informed of the relationship between the physician and patient and the respective role of any other health care provider with respect to management of the patient; and (2) notified that he or she may decline to receive medical services by telemedicine and may withdraw from such care at any time.    Date: 6/13/2025   Site of therapist:  [facility]         Therapeutic Intervention: Met with patient.  Outpatient - Insight oriented psychotherapy 45 min - CPT code 56971    Referring provider:    Chief complaint/reason for encounter: depression and anxiety   Met with patient to evaluate psychosocial adaptation to survivorship of  BC    Patient was last seen by me on 5/29/2025    Objective:      Madhu Dela Cruz arrived promptly for the session.   Appearance: age appropriate, appropriately  dressed, adequately  groomed  Behavior/Cooperation: friendly and cooperative  Speech: normal in rate, volume, and tone and appropriate quality, quantity and organization of sentences  Mood: steady  Affect: mood congruent  Thought Process: goal-directed, logical  Thought Content: normal,  No delusions or paranoia; did not appear to be responding to internal stimuli during the session  Orientation: grossly intact  Memory: Grossly intact  Attention Span/Concentration: Attends to session without distraction; reports no difficulty  Fund of Knowledge: average  Estimate of Intelligence: average from verbal skills and history  Cognition: grossly intact  Insight: patient has awareness of illness; good insight into own behavior and behavior of others  Judgment: the patient's behavior is adequate to circumstances      Interval history and content of current session: Patient reviewed information and documented task. Naturally stopped task to benefit her functioning.   Discussed diagnosis, treatment, prognosis, current adaptation to disease and treatment status, and family's adaptation to disease and treatment status. Reports to be coping with great difficulty. Evaluated cognitive response, paying particular attention to negative intrusive thoughts of a persistent and detrimental nature. Thoughts of this type are in evidence with mild distress. Identified and evaluated psychosocial and environmental stressors secondary to diagnosis and treatment.     Focused on providing cognitive behavioral therapy to address negative cognitions. Examined proactive behaviors that may be implemented to minimize or ameliorate psychosocial stressors secondary to diagnosis and treatment.     Risk parameters:   Patient reports no suicidal ideation  Patient reports no homicidal  ideation  Patient reports no self-injurious behavior  Patient reports no violent behavior   Safety needs:  None at this time      Verbal deficits: None     Patient's response to intervention:The patient's response to intervention is accepting.     Progress toward goals and other mental status changes:  The patient's progress toward goals is good.      Progress to date:Progress as Expected      Goals from last visit: Met      Patient reported outcomes:      Distress Thermometer:   Distress Score    Distress Score: 3        Practical Problems Physical Problems                                                   Family Problems                                         Emotional Problems                                                         Spiritual/Religions Concerns               Other Problems             PHQ ANSWERS    Q1. Little interest or pleasure in doing things: (Patient-Rptd) (P) Not at all (06/13/25 1256)  Q2. Feeling down, depressed, or hopeless: (Patient-Rptd) (P) Not at all (06/13/25 1256)  Q3. Trouble falling or staying asleep, or sleeping too much: (Patient-Rptd) (P) Not at all (06/13/25 1256)  Q4. Feeling tired or having little energy: (Patient-Rptd) (P) Several days (06/13/25 1256)  Q5. Poor appetite or overeating: (Patient-Rptd) (P) Not at all (06/13/25 1256)  Q6. Feeling bad about yourself - or that you are a failure or have let yourself or your family down: (Patient-Rptd) (P) Not at all (06/13/25 1256)  Q7. Trouble concentrating on things, such as reading the newspaper or watching television: (Patient-Rptd) (P) Not at all (06/13/25 1256)  Q8. Moving or speaking so slowly that other people could have noticed. Or the opposite - being so fidgety or restless that you have been moving around a lot more than usual: (Patient-Rptd) (P) Not at all (06/13/25 1256)  Q9.  0    PHQ8 Score : (Patient-Rptd) (P) 1 (06/13/25 1256)  PHQ-9 Total Score: (Patient-Rptd) (P) 1 (06/13/25 1256)     ROMELIA-7 Answers         6/13/2025    12:56 PM   GAD7   1. Feeling nervous, anxious, or on edge? 0   2. Not being able to stop or control worrying? 0   3. Worrying too much about different things? 0   4. Trouble relaxing? 0   5. Being so restless that it is hard to sit still? 0   6. Becoming easily annoyed or irritable? 1   7. Feeling afraid as if something awful might happen? 0   8. If you checked off any problems, how difficult have these problems made it for you to do your work, take care of things at home, or get along with other people? 0   ROMELIA-7 Score 1        Patient-reported     ROMELIA-7 Score: (Patient-Rptd) (P) 1  Interpretation: (Patient-Rptd) (P) Normal       Client Strengths: verbal, intelligent, successful, good social support, good insight, commitment to wellness, strong jay, strong cultural traditions      ICD-10-CM ICD-9-CM   1. Adjustment disorder with anxious mood  F43.22 309.24   2. Malignant neoplasm of upper-outer quadrant of right breast in female, estrogen receptor positive  C50.411 174.4    Z17.0 V86.0        Treatment Plan:individual psychotherapy and medication management by physician  Target symptoms: anxiety   Why chosen therapy is appropriate versus another modality: relevant to diagnosis, patient responds to this modality, evidence based practice  Outcome monitoring methods: self-report, observation, checklist/rating scale  Therapeutic intervention type: insight oriented psychotherapy, behavior modifying psychotherapy  Prognosis: Good      Behavioral goals:    Exercise:   Stress management: Label 3 emotions daily.    Social engagement:   Nutrition:   Smoking Cessation:   Therapy:  Increase daily self-care and attention to health management  Pleasant events scheduling and increased social interaction  Monitor stressors in writing and bring to next visit    Return to clinic: as scheduled    Next Session: Suffering vs. Vitality     Length of Service (minutes direct face-to-face contact): 45          Arline GARZON  Diana, PhD  Clinical Psychologist  [place] License #[id number]  [place] License #[id number]

## 2025-06-17 ENCOUNTER — TELEPHONE (OUTPATIENT)
Dept: PSYCHIATRY | Facility: CLINIC | Age: 40
End: 2025-06-17
Payer: COMMERCIAL

## 2025-07-02 ENCOUNTER — PATIENT MESSAGE (OUTPATIENT)
Dept: PSYCHIATRY | Facility: CLINIC | Age: 40
End: 2025-07-02
Payer: COMMERCIAL

## 2025-07-02 ENCOUNTER — TELEPHONE (OUTPATIENT)
Dept: PSYCHIATRY | Facility: CLINIC | Age: 40
End: 2025-07-02
Payer: COMMERCIAL

## 2025-07-07 ENCOUNTER — PATIENT MESSAGE (OUTPATIENT)
Dept: HEMATOLOGY/ONCOLOGY | Facility: CLINIC | Age: 40
End: 2025-07-07
Payer: COMMERCIAL

## 2025-07-17 ENCOUNTER — LAB VISIT (OUTPATIENT)
Dept: LAB | Facility: HOSPITAL | Age: 40
End: 2025-07-17
Attending: STUDENT IN AN ORGANIZED HEALTH CARE EDUCATION/TRAINING PROGRAM
Payer: COMMERCIAL

## 2025-07-17 DIAGNOSIS — Z17.0 MALIGNANT NEOPLASM OF UPPER-OUTER QUADRANT OF RIGHT BREAST IN FEMALE, ESTROGEN RECEPTOR POSITIVE: ICD-10-CM

## 2025-07-17 DIAGNOSIS — C50.411 MALIGNANT NEOPLASM OF UPPER-OUTER QUADRANT OF RIGHT BREAST IN FEMALE, ESTROGEN RECEPTOR POSITIVE: ICD-10-CM

## 2025-07-17 LAB
ALBUMIN SERPL BCP-MCNC: 3.4 G/DL (ref 3.5–5.2)
ALP SERPL-CCNC: 128 UNIT/L (ref 40–150)
ALT SERPL W/O P-5'-P-CCNC: 133 UNIT/L (ref 10–44)
ANION GAP (OHS): 8 MMOL/L (ref 8–16)
AST SERPL-CCNC: 103 UNIT/L (ref 11–45)
BILIRUB SERPL-MCNC: 0.2 MG/DL (ref 0.1–1)
BUN SERPL-MCNC: 8 MG/DL (ref 6–20)
CALCIUM SERPL-MCNC: 9 MG/DL (ref 8.7–10.5)
CHLORIDE SERPL-SCNC: 104 MMOL/L (ref 95–110)
CO2 SERPL-SCNC: 26 MMOL/L (ref 23–29)
CREAT SERPL-MCNC: 0.8 MG/DL (ref 0.5–1.4)
GFR SERPLBLD CREATININE-BSD FMLA CKD-EPI: >60 ML/MIN/1.73/M2
GLUCOSE SERPL-MCNC: 112 MG/DL (ref 70–110)
POTASSIUM SERPL-SCNC: 3.9 MMOL/L (ref 3.5–5.1)
PROT SERPL-MCNC: 7.3 GM/DL (ref 6–8.4)
SODIUM SERPL-SCNC: 138 MMOL/L (ref 136–145)

## 2025-07-17 PROCEDURE — 36415 COLL VENOUS BLD VENIPUNCTURE: CPT | Mod: PN

## 2025-07-17 PROCEDURE — 80053 COMPREHEN METABOLIC PANEL: CPT

## 2025-07-18 ENCOUNTER — OFFICE VISIT (OUTPATIENT)
Dept: HEPATOLOGY | Facility: CLINIC | Age: 40
End: 2025-07-18
Payer: COMMERCIAL

## 2025-07-18 ENCOUNTER — TELEPHONE (OUTPATIENT)
Dept: HEPATOLOGY | Facility: CLINIC | Age: 40
End: 2025-07-18

## 2025-07-18 DIAGNOSIS — R74.01 TRANSAMINITIS: ICD-10-CM

## 2025-07-18 DIAGNOSIS — E66.813 CLASS 3 SEVERE OBESITY WITH BODY MASS INDEX (BMI) OF 40.0 TO 44.9 IN ADULT, UNSPECIFIED OBESITY TYPE, UNSPECIFIED WHETHER SERIOUS COMORBIDITY PRESENT: ICD-10-CM

## 2025-07-18 DIAGNOSIS — K76.0 METABOLIC DYSFUNCTION-ASSOCIATED STEATOTIC LIVER DISEASE (MASLD): Primary | ICD-10-CM

## 2025-07-18 NOTE — TELEPHONE ENCOUNTER
----- Message from Jennifer Scheuermann, PA sent at 7/18/2025 11:53 AM CDT -----  Pls schedule   -labs in 3-4 weeks (tell her intentionally scheduling labs then so we can trend the liver numbers  -fibroscan  - f/u visit w/ me after above

## 2025-07-18 NOTE — Clinical Note
Pls schedule  -labs in 3-4 weeks (tell her intentionally scheduling labs then so we can trend the liver numbers -fibroscan - f/u visit w/ me after above

## 2025-07-18 NOTE — PROGRESS NOTES
HEPATOLOGY VIDEO VISIT NOTE   Patient's location: [place]  Visit type: Audiovisual     Each patient to whom he or she provides medical services by telemedicine is:  (1) informed of the relationship between the physician and patient and the respective role of any other health care provider with respect to management of the patient; and (2) notified that he or she may decline to receive medical services by telemedicine and may withdraw from such care at any time.     REFERRING PROVIDER: Kassie Blakely MD  CHIEF COMPLAINT: Abnl liver labs, fatty liver       HISTORY      This is a 40 y.o. Black or  female referred for elevated liver enzymes w/ hepatic steatosis on imaging.    PMH significant for breast cancer dx 1/2024, s/p neoadjuvant TCHP followed by b/l mastectomy 7/2024. She was treated w/ trastuzumab immunotherapy through 3/2025 and tamoxifen (started 8/2024 -> d/c 6/2025 due to transaminitis) during which time she gained ~40lbs.       Prior liver disease or icteric illness: No     Per [facility]  Imaging:  U/S 4/2025: HM 19.2cm w/ increased liver echogenicity c/f hepatic steatosis    Labs:  Transaminases normal (teens-20s) through 1/2025  Beginning 2/2025 AST/ALT elevated: 50s-70s at first --> now 100s/130s  TBili, ALP, Plt persistently normal     Risks for steatotic liver:  DM: HbA1c 6.6 (4/2025)   HLD: no meds  Class 3 Obesity; BMI 41 (6/2025); 40 lb wt gain 1/2024 -> 4/2025  Denies heavy / regular alcohol use    Liver staging:  No formal staging  Labs / U/S w/o obvious cirrhosis, portal HTN. No liver dysfunction.  FIB-4 1.95 (6/2025) - intermediate-risk of advanced fibrosis for ages 36-64.      Weight history:  40 lb wt gain associated w/ cancer treatment  208 lbs / BMI 35 (1/2024) --> 248 lbs / BMI 42 (4/2025)  Started mounjaro early June: down 8 lbs as of June oncology visit    Screening for other liver disease:  HCVAB: neg, 6/2025  HBsAg: neg, 6/2025     Alternative / herbal products:  Turmeric x  1 week but then d/c  Dandelion tea  Green tea    Denies jaundice, dark urine, abdominal distention, hematemesis, melena, slowed mentation.   (+) fatigue  Nausea for few weeks    PMH, PSH, SOCIAL HX, FAMILY HX      Reviewed in Epic  Pertinent findings:  FAMILY HX: neg for liver diease, cirrhosis, HCC  SOCIAL HX: . Resides locally  Alcohol - No history of regular / heavy use.   Drugs - No        ROS: as per HPI        PHYSICAL EXAM:  Friendly Black or  female in no acute distress; alert and oriented to person, place and time  LUNGS: Normal respiratory effort.  NEURO/PSYCH: Memory intact. Thought and speech pattern appropriate. Behavior normal.      PERTINENT DIAGNOSTIC RESULTS       Lab Results   Component Value Date    WBC 7.26 06/11/2025    HGB 13.1 06/11/2025     06/11/2025    INR 1.1 06/11/2025       Lab Results   Component Value Date     07/17/2025    K 3.9 07/17/2025    BUN 8 07/17/2025    CREATININE 0.8 07/17/2025    ALBUMIN 3.4 (L) 07/17/2025    ALKPHOS 128 07/17/2025    BILITOT 0.2 07/17/2025     (H) 07/17/2025     (H) 07/17/2025          ASSESSMENT       40 y.o. Black or  female with:  Elevated transaminases  -- stable but recent subtle rise  -- no liver dysfunction  -- likely due to steatosis from tamoxifen (which was d/c 6/11/25) and/or metabolic risks   -- ?DILI from tamoxifen    *even though tamoxifen was d/c 6/11, resolution of transaminitis can be slow    Steatotic liver disease  -- Transaminases: Elevated  -- Staging: ?  -- RF: Obesity Classification: Class 3 (BMI >/=40), DM, HLD        PLAN       1. Labs in 3-4 wks  2. FibroScan  3. Continue wt loss  - agree w/ mounjaro  4. Good control of DM, HLD: defer to PCP  5. For now remain off tamoxifen  6. Continue to avoid alcohol; limit tylenol to 2000mg daily; d/c herbal teas  7. F/U visit     Orders Placed This Encounter   Procedures    FibroScan Transplant Hepatology(Vibration Controlled  Transient Elastography)    CORNELIA Screen w/Reflex    Anti-Smooth Muscle Antibody    IgG    Hepatic Function Panel       __________________________________________________________________     Duration of encounter: 52 min  This includes face-to-face time and non face-to-face time preparing to see the patient (eg, review of tests), obtaining and/or reviewing separately obtained history, documenting clinical information in the electronic or other health record, independently interpreting resultsand communicating results to the patient/family/caregiver, or care coordination

## 2025-07-23 ENCOUNTER — OFFICE VISIT (OUTPATIENT)
Dept: PSYCHIATRY | Facility: CLINIC | Age: 40
End: 2025-07-23
Payer: COMMERCIAL

## 2025-07-23 DIAGNOSIS — C50.411 MALIGNANT NEOPLASM OF UPPER-OUTER QUADRANT OF RIGHT BREAST IN FEMALE, ESTROGEN RECEPTOR POSITIVE: ICD-10-CM

## 2025-07-23 DIAGNOSIS — Z17.0 MALIGNANT NEOPLASM OF UPPER-OUTER QUADRANT OF RIGHT BREAST IN FEMALE, ESTROGEN RECEPTOR POSITIVE: ICD-10-CM

## 2025-07-23 DIAGNOSIS — F43.22 ADJUSTMENT DISORDER WITH ANXIOUS MOOD: Primary | ICD-10-CM

## 2025-07-23 PROCEDURE — 1159F MED LIST DOCD IN RCRD: CPT | Mod: CPTII,95,, | Performed by: PSYCHOLOGIST

## 2025-07-23 PROCEDURE — 90834 PSYTX W PT 45 MINUTES: CPT | Mod: 95,,, | Performed by: PSYCHOLOGIST

## 2025-07-27 ENCOUNTER — PATIENT MESSAGE (OUTPATIENT)
Dept: HEMATOLOGY/ONCOLOGY | Facility: CLINIC | Age: 40
End: 2025-07-27
Payer: COMMERCIAL

## 2025-08-14 ENCOUNTER — OFFICE VISIT (OUTPATIENT)
Dept: PSYCHIATRY | Facility: CLINIC | Age: 40
End: 2025-08-14
Payer: COMMERCIAL

## 2025-08-14 ENCOUNTER — PATIENT MESSAGE (OUTPATIENT)
Dept: PSYCHIATRY | Facility: CLINIC | Age: 40
End: 2025-08-14
Payer: COMMERCIAL

## 2025-08-14 DIAGNOSIS — Z17.0 MALIGNANT NEOPLASM OF UPPER-OUTER QUADRANT OF RIGHT BREAST IN FEMALE, ESTROGEN RECEPTOR POSITIVE: ICD-10-CM

## 2025-08-14 DIAGNOSIS — C50.411 MALIGNANT NEOPLASM OF UPPER-OUTER QUADRANT OF RIGHT BREAST IN FEMALE, ESTROGEN RECEPTOR POSITIVE: ICD-10-CM

## 2025-08-14 DIAGNOSIS — F43.22 ADJUSTMENT DISORDER WITH ANXIOUS MOOD: Primary | ICD-10-CM

## 2025-08-14 PROCEDURE — 90832 PSYTX W PT 30 MINUTES: CPT | Mod: 95,,, | Performed by: PSYCHOLOGIST

## 2025-08-22 ENCOUNTER — LAB VISIT (OUTPATIENT)
Dept: LAB | Facility: HOSPITAL | Age: 40
End: 2025-08-22
Payer: COMMERCIAL

## 2025-08-22 ENCOUNTER — PROCEDURE VISIT (OUTPATIENT)
Dept: HEPATOLOGY | Facility: CLINIC | Age: 40
End: 2025-08-22
Payer: COMMERCIAL

## 2025-08-22 DIAGNOSIS — R74.01 TRANSAMINITIS: ICD-10-CM

## 2025-08-22 DIAGNOSIS — K76.0 METABOLIC DYSFUNCTION-ASSOCIATED STEATOTIC LIVER DISEASE (MASLD): ICD-10-CM

## 2025-08-22 LAB
ALBUMIN SERPL BCP-MCNC: 3.3 G/DL (ref 3.5–5.2)
ALP SERPL-CCNC: 107 UNIT/L (ref 40–150)
ALT SERPL W/O P-5'-P-CCNC: 170 UNIT/L (ref 0–55)
AST SERPL-CCNC: 149 UNIT/L (ref 0–50)
BILIRUB DIRECT SERPL-MCNC: 0.2 MG/DL (ref 0.1–0.3)
BILIRUB SERPL-MCNC: 0.5 MG/DL (ref 0.1–1)
IGG SERPL-MCNC: 1508 MG/DL (ref 650–1600)
PROT SERPL-MCNC: 7 GM/DL (ref 6–8.4)

## 2025-08-22 PROCEDURE — 86015 ACTIN ANTIBODY EACH: CPT

## 2025-08-22 PROCEDURE — 82784 ASSAY IGA/IGD/IGG/IGM EACH: CPT

## 2025-08-22 PROCEDURE — 86038 ANTINUCLEAR ANTIBODIES: CPT

## 2025-08-22 PROCEDURE — 36415 COLL VENOUS BLD VENIPUNCTURE: CPT

## 2025-08-22 PROCEDURE — 80076 HEPATIC FUNCTION PANEL: CPT

## 2025-08-22 PROCEDURE — 91200 LIVER ELASTOGRAPHY: CPT | Mod: S$GLB,,, | Performed by: PHYSICIAN ASSISTANT

## 2025-08-25 LAB
ANA (OHS): NORMAL
SMOOTH MUSCLE AB TITR SER IF: NORMAL {TITER}

## 2025-08-28 ENCOUNTER — OFFICE VISIT (OUTPATIENT)
Dept: HEPATOLOGY | Facility: CLINIC | Age: 40
End: 2025-08-28

## 2025-08-28 ENCOUNTER — TELEPHONE (OUTPATIENT)
Dept: PSYCHIATRY | Facility: CLINIC | Age: 40
End: 2025-08-28
Payer: COMMERCIAL

## 2025-08-28 DIAGNOSIS — R74.01 TRANSAMINITIS: Primary | ICD-10-CM

## 2025-08-28 DIAGNOSIS — E66.813 CLASS 3 SEVERE OBESITY WITH BODY MASS INDEX (BMI) OF 40.0 TO 44.9 IN ADULT, UNSPECIFIED OBESITY TYPE, UNSPECIFIED WHETHER SERIOUS COMORBIDITY PRESENT: ICD-10-CM

## 2025-08-28 DIAGNOSIS — K74.00 HEPATIC FIBROSIS: ICD-10-CM

## 2025-08-28 DIAGNOSIS — K76.0 METABOLIC DYSFUNCTION-ASSOCIATED STEATOTIC LIVER DISEASE (MASLD): ICD-10-CM

## 2025-08-29 ENCOUNTER — TELEPHONE (OUTPATIENT)
Dept: HEPATOLOGY | Facility: CLINIC | Age: 40
End: 2025-08-29
Payer: COMMERCIAL

## 2025-09-04 ENCOUNTER — RESULTS FOLLOW-UP (OUTPATIENT)
Dept: HEPATOLOGY | Facility: CLINIC | Age: 40
End: 2025-09-04
Payer: COMMERCIAL

## 2025-09-04 DIAGNOSIS — R74.01 TRANSAMINITIS: Primary | ICD-10-CM

## 2025-09-05 ENCOUNTER — TELEPHONE (OUTPATIENT)
Dept: HEPATOLOGY | Facility: CLINIC | Age: 40
End: 2025-09-05
Payer: COMMERCIAL